# Patient Record
Sex: FEMALE | Race: BLACK OR AFRICAN AMERICAN | NOT HISPANIC OR LATINO | Employment: FULL TIME | URBAN - METROPOLITAN AREA
[De-identification: names, ages, dates, MRNs, and addresses within clinical notes are randomized per-mention and may not be internally consistent; named-entity substitution may affect disease eponyms.]

---

## 2017-01-04 ENCOUNTER — TELEPHONE (OUTPATIENT)
Dept: PODIATRY | Facility: CLINIC | Age: 47
End: 2017-01-04

## 2017-01-10 ENCOUNTER — TELEPHONE (OUTPATIENT)
Dept: FAMILY MEDICINE | Facility: CLINIC | Age: 47
End: 2017-01-10

## 2017-01-10 ENCOUNTER — OFFICE VISIT (OUTPATIENT)
Dept: PODIATRY | Facility: CLINIC | Age: 47
End: 2017-01-10
Payer: MEDICAID

## 2017-01-10 VITALS
WEIGHT: 260.38 LBS | HEART RATE: 78 BPM | RESPIRATION RATE: 20 BRPM | DIASTOLIC BLOOD PRESSURE: 77 MMHG | SYSTOLIC BLOOD PRESSURE: 120 MMHG | HEIGHT: 64 IN | BODY MASS INDEX: 44.45 KG/M2

## 2017-01-10 DIAGNOSIS — M79.672 PAIN IN BOTH FEET: Primary | ICD-10-CM

## 2017-01-10 DIAGNOSIS — L84 CORN OR CALLUS: ICD-10-CM

## 2017-01-10 DIAGNOSIS — M79.673 PAIN OF FOOT, UNSPECIFIED LATERALITY: Primary | ICD-10-CM

## 2017-01-10 DIAGNOSIS — B35.1 ONYCHOMYCOSIS DUE TO DERMATOPHYTE: ICD-10-CM

## 2017-01-10 DIAGNOSIS — M79.671 PAIN IN BOTH FEET: Primary | ICD-10-CM

## 2017-01-10 PROCEDURE — 99203 OFFICE O/P NEW LOW 30 MIN: CPT | Mod: S$GLB,,, | Performed by: PODIATRIST

## 2017-01-10 RX ORDER — CICLOPIROX 80 MG/ML
SOLUTION TOPICAL NIGHTLY
Qty: 6.6 ML | Refills: 3 | Status: SHIPPED | OUTPATIENT
Start: 2017-01-10 | End: 2017-03-31 | Stop reason: SDUPTHER

## 2017-01-10 RX ORDER — CICLOPIROX 80 MG/ML
SOLUTION TOPICAL NIGHTLY
Qty: 6.6 ML | Refills: 3 | Status: SHIPPED | OUTPATIENT
Start: 2017-01-10 | End: 2017-01-10 | Stop reason: SDUPTHER

## 2017-01-10 NOTE — PATIENT INSTRUCTIONS
What Are Corns and Calluses?    Corns and calluses are your bodys response to friction or pressure against the skin. If your foot rubs the inside of your shoe, the affected area of skin thickens. Or, if a bone is not in the normal position, skin caught between bone and shoe or bone and ground builds up. In either case, the outer layer of skin thickens to protect the foot from unusual pressure. In many cases, corns and calluses look bad but are not harmful. However, more severe corns and calluses may become infected, destroy healthy tissue, or affect foot movement.    Corns  Corns usually grow on top of the foot, often at the toe joint. Corns can range from a slight thickening of skin to a painful soft or hard bump. They often form on top of buckled toe joints (hammer toes). If your toes curl under, corns may grow on the tips of the toes. You may also get a corn on the end of a toe if it rubs against your shoe. Corns can also grow between toes, often between the first and second toes.    Calluses  Calluses grow on the bottom of the foot or on the outer edge of a toe or heel. A callus may spread across the ball of your foot. This type of callus is usually due to a problem with a metatarsal (the long bone at the base of a toe, near the ball of the foot). A pinch callus may grow along the outer edge of the heel or the big toe. Some calluses press up into the foot instead of spreading on the outside. A callus may form a central core or plug of tissue where pressure is greatest.  © 8558-1726 [employer]. [address] [place] [address]. All rights reserved. This information is not intended as a substitute for professional medical care. Always follow your healthcare professional's instructions.      Treating Corns and Calluses  If your corns or calluses are mild, reducing friction may help. Different shoes, moleskin patches, or soft pads may be all the treatment you need. In more severe cases,  treating tissue buildup may require your doctors care. Sometimes custom-made shoe inserts (orthotics) or special pads are prescribed to reduce friction and pressure.    Change shoes  If you have corns, your doctor may suggest wearing shoes that have more toe room. This way, buckled joints are less likely to be pinched against the top of the shoe. If you have calluses, wearing a cushioned insole, arch support, or heel counter can help reduce friction.  Visit your doctor  In some cases, your doctor may trim away the outer layers of skin that make up the corn or callus. For a painful corn, medicine may be injected beneath the built-up tissue.  Wear orthotics  Orthotics are specially made to meet the needs of your feet. They cushion calluses or divert pressure away from these problem areas. Worn as directed, orthotics help limit existing problems and prevent new ones from forming.  If you need surgery  If a bone or joint is out of place, certain parts of your foot may be under too much pressure. This can cause severe corns and calluses. In such cases, surgery may be the best way to correct the problem.  Outpatient procedures  In most cases, surgery to improve bone position is an outpatient procedure. Your doctor may cut away excess bone, reposition prominent bones, or even fuse joints. Sometimes tendons or ligaments are cut to reduce tension on a bone or joint. Your doctor will talk with you about the procedure that is best suited to your needs.  © 2911-4446 [employer]. [address] [place] [address], [place] [id number]. All rights reserved. This information is not intended as a substitute for professional medical care. Always follow your healthcare professional's instructions.          Fungal Infection of Nails  Fungal infection of the nails changes the way fingernails and toenails look. They may thicken, discolor, change shape, or split. This condition is hard to treat because nails grow slowly and have limited  blood supply. It is common for the infection to come back after treatment.  There are 2 types of medicines used.  · Topical anti-fungal medicines are applied to the surface of the skin and nail area. These medicines are not very effective because they cannot get deep into the nail.  · Topical medicines are most useful in combination with oral medicines. Oral antifungal medicines work better because they penetrate the nail from the inside out. However, recurrence still occurs and it may take 9 to 12 months to determine if you have been cured or not (i.e., for your nail to look normal again). You can repeat treatment if needed. Medications are well-tolerated and it is uncommon to need to stop therapy due to side effects, but your doctor may perform some monitoring tests. Discuss potential side effects with your doctor before starting treatment.  If medicines fail, the nail can be removed surgically or chemically. This improves the effectiveness of medical treatment because the fungus is physically removed from the body.  Home care  The following will help you care for your infection at home:  1. Use medicines exactly as directed for as long as directed. Treating a fungal infection can take longer than other kinds of infections.  2. Smoking is a risk factor for fungal infection. This is one more reason to quit.  3. Wear absorbent socks and shoes that let your feet breathe. Sweaty feet increase risk of fungal infection and make an existing infection harder to treat.  4. Use footwear when in damp public places like swimming pools, gyms, and shower rooms. This will help you avoid the fungus that grows there.  5. Don't share nail clippers or scissors with others.  Follow-up care  Follow up with your doctor as directed by our staff.  When to seek medical care  Get prompt medical attention if any of the following occur:  · Skin by the nail becomes reddened, swollen, painful, or drains pus  · Side effects from oral  anti-fungal medicines  © 1193-7283 [employer]. [address] [place] [address], [place] [address]. All rights reserved. This information is not intended as a substitute for professional medical care. Always follow your healthcare professional's instructions.  Ciclopirox Olamine Topical solution  What is this medicine?  CICLOPIROX (sye kloe PEER ox) NAIL SOLUTION is an antifungal medicine. It used to treat fungal infections of the nails.  This medicine may be used for other purposes; ask your health care provider or pharmacist if you have questions.  What should I tell my health care provider before I take this medicine?  They need to know if you have any of these conditions:  · diabetes mellitus  · history of seizures  · HIV infection  · immune system problems or organ transplant  · large areas of burned or damaged skin  · peripheral vascular disease or poor circulation  · taking corticosteroid medication (including steroid inhalers, cream, or lotion)  · an unusual or allergic reaction to ciclopirox, isopropyl alcohol, other medicines, foods, dyes, or preservatives  · pregnant or trying to get pregnant  · breast-feeding  How should I use this medicine?  This medicine is for external use only. Follow the directions that come with this medicine exactly. Wash and dry your hands before use. Avoid contact with the eyes, mouth or nose. If you do get this medicine in your eyes, rinse out with plenty of cool tap water. Contact your doctor or health care professional if eye irritation occurs. Use at regular intervals. Do not use your medicine more often than directed. Finish the full course prescribed by your doctor or health care professional even if you think you are better. Do not stop using except on your doctor's advice.  Talk to your pediatrician regarding the use of this medicine in children. While this medicine may be prescribed for children as young as 12 years for selected conditions, precautions do  apply.  Overdosage: If you think you have taken too much of this medicine contact a poison control center or emergency room at once.  NOTE: This medicine is only for you. Do not share this medicine with others.  What if I miss a dose?  If you miss a dose, use it as soon as you can. If it is almost time for your next dose, use only that dose. Do not use double or extra doses.  What may interact with this medicine?  Interactions are not expected. Do not use any other skin products without telling your doctor or health care professional.  This list may not describe all possible interactions. Give your health care provider a list of all the medicines, herbs, non-prescription drugs, or dietary supplements you use. Also tell them if you smoke, drink alcohol, or use illegal drugs. Some items may interact with your medicine.  What should I watch for while using this medicine?  Tell your doctor or health care professional if your symptoms get worse. Four to six months of treatment may be needed for the nail(s) to improve. Some people may not achieve a complete cure or clearing of the nails by this time.  Tell your doctor or health care professional if you develop sores or blisters that do not heal properly. If your nail infection returns after stopping using this product, contact your doctor or health care professional.  What side effects may I notice from receiving this medicine?  Side effects that you should report to your doctor or health care professional as soon as possible:  · allergic reactions like skin rash, itching or hives, swelling of the face, lips, or tongue  · severe irritation, redness, burning, blistering, peeling, swelling, oozing  Side effects that usually do not require medical attention (report to your doctor or health care professional if they continue or are bothersome):  · mild reddening of the skin  · nail discoloration  · temporary burning or mild stinging at the site of application  This list may  not describe all possible side effects. Call your doctor for medical advice about side effects. You may report side effects to FDA at 1-836-UFX-0174.  Where should I keep my medicine?  Keep out of the reach of children.  Store at room temperature between 15 and 30 degrees C (59 and 86 degrees F). Do not freeze. Protect from light by storing the bottle in the carton after every use. This medicine is flammable. Keep away from heat and flame. Throw away any unused medicine after the expiration date.  NOTE:This sheet is a summary. It may not cover all possible information. If you have questions about this medicine, talk to your doctor, pharmacist, or health care provider. Copyright© 2016 [employer]

## 2017-01-10 NOTE — PROGRESS NOTES
Subjective:    Patient ID: Indira Yousif is a 46 y.o. female.    Chief Complaint: Callouses (bilateral feet) and Toe Pain      HPI:   Indira is a 46 y.o. female who presents to the clinic complaining of thick and discolored toenails on both feet. Indira is inquiring about treatment options.  Pt also reports b/l foot pain. Denies trauma. Feels lesions on bottom of feet that she thinks are growing. No treatments so far. Pain 3/10.     HPI    Last Podiatry Enc: Visit date not found  Last Enc w/ Me: Visit date not found    Past Medical History   Diagnosis Date    Anxiety      Past Surgical History   Procedure Laterality Date    Tubal ligation       section       x2    Hernia repair      Cyst removal Left     Hysterectomy       partial     Social History     Social History    Marital status:      Spouse name: N/A    Number of children: N/A    Years of education: N/A     Occupational History    Not on file.     Social History Main Topics    Smoking status: Never Smoker    Smokeless tobacco: Not on file    Alcohol use 0.0 oz/week     0 Standard drinks or equivalent per week      Comment: socially    Drug use: No    Sexual activity: Yes     Partners: Male     Birth control/ protection: Surgical     Other Topics Concern    Not on file     Social History Narrative    Lives with  and 2 children (21,13). Grandbaby visits frequently. Works are [employer]. Watch movies. Family life is a big component of her life. Raises rabbits, and garden.          Current Outpatient Prescriptions:     ciclopirox (PENLAC) 8 % Soln, Apply topically nightly., Disp: 6.6 mL, Rfl: 3    hydrocodone-acetaminophen 7.5-325mg (NORCO) 7.5-325 mg per tablet, Take 1 tablet by mouth every 6 (six) hours as needed for Pain., Disp: 40 tablet, Rfl: 0    oxycodone-acetaminophen (PERCOCET)  mg per tablet, Take 1 tablet by mouth every 4 (four) hours as needed., Disp: 40 tablet, Rfl: 0  Review of patient's  "allergies indicates:  No Known Allergies    Visit Vitals    /77 (BP Location: Left arm, Patient Position: Sitting, BP Method: Automatic)    Pulse 78    Resp 20    Ht 5' 4" (1.626 m)    Wt 118.1 kg (260 lb 6.4 oz)    BMI 44.7 kg/m2       ROS  ROS Constitutional:  General Appearance: well nourished  Vascular: negative for cramps, edema and bruising  Musculoskeletal: negative for joint paint and joint edema  Skin: negative for rashes and lesions  Neurological: negative for burning, tingling and numbness  Gastrointestinal: negative for stomach pain, nausea and vomiting        Objective:        Ortho/SPM Exam  Physical Exam    V: DP 1/4, PT 2/4, CRT< 3s to all digits tested.     N: SILT in SP/DP/T/Alissa/Saph distributions.    Derm: Skin intact. No erythema, edema or ecchymosis. + hyperkeratotic lesions L 5th met base and R plantar foot. Nails elongated, mycotic, and dystrophic x10.    Ortho:  +Motor EHL/FHL/TA/GA   +TTP L 5th met base and R plantar midfoot   Compartments soft/compressible. No pain on passive stretch of big toe. No calf  pain.        Assessment:     Imaging: ordered on way out        1. Pain in both feet    2. Corn or callus    3. Onychomycosis due to dermatophyte          Plan:       Orders Placed This Encounter    X-Ray Foot Complete Bilateral    ciclopirox (PENLAC) 8 % Soln     .   Indira was seen today for callouses and toe pain.    Diagnoses and all orders for this visit:    Pain in both feet  -     X-Ray Foot Complete Bilateral; Future    Corn or callus    Onychomycosis due to dermatophyte  -     ciclopirox (PENLAC) 8 % Soln; Apply topically nightly.        Indira Yousif is a 46 y.o. female presenting w/   1. Pain in both feet    2. Corn or callus    3. Onychomycosis due to dermatophyte        -pt seen, evaluated, and managed  -dx discussed in detail. All questions/concerns addressed  -all tx options discussed. All alternatives, risks, benefits of all txs discussed  -The patient was " educated regarding the above diagnosis. We discussed conservative care options regarding shoe wear and/or padding.  -rxs dispensed: penlac for fungal nails  -offloading pads for calluses  -rec'd OTC wart kit for calluses  -xr on way out --> will review at nxt visit       Return in about 6 weeks (around 2/21/2017) for xr review.

## 2017-01-10 NOTE — MR AVS SNAPSHOT
[address]  [address]   [place] [address]  Phone: [phone]  Fax: [phone]                  Indira Yousif   1/10/2017 3:00 PM   Office Visit    Description:  Female : 1970   Provider:  Guru Benito Jr., DPM   Department:  [facility]           Reason for Visit     Callouses     Toe Pain           Diagnoses this Visit        Comments    Pain in both feet    -  Primary     Corn or callus         Onychomycosis due to dermatophyte                To Do List           Future Appointments        Provider Department Dept Phone    2017 2:00 PM Guru Benito Jr., DPM [facility] [phone]      Goals (5 Years of Data)     None      Follow-Up and Disposition     Return in about 6 weeks (around 2017) for xr review.       These Medications        Disp Refills Start End    ciclopirox (PENLAC) 8 % Soln 6.6 mL 3 1/10/2017     Apply topically nightly. - Topical    Pharmacy: [facility] [address] [place] [address] [id number] [place] [address] Ph #: [phone]         [facility]     [facility] On [facility] Nurse Care Line -  Assistance  Registered nurses in the [facility] Center provide clinical advisement, health education, appointment booking, and other advisory services.  Call for this free service at [phone].             Medications           Message regarding Medications     Verify the changes and/or additions to your medication regime listed below are the same as discussed with your clinician today.  If any of these changes or additions are incorrect, please notify your healthcare provider.        START taking these NEW medications        Refills    ciclopirox (PENLAC) 8 % Soln 3    Sig: Apply topically nightly.    Class: Normal    Route: Topical           Verify that the below list of medications is an accurate representation of the medications you are currently taking.  If none reported, the list may be  "blank. If incorrect, please contact your healthcare provider. Carry this list with you in case of emergency.           Current Medications     ciclopirox (PENLAC) 8 % Soln Apply topically nightly.    hydrocodone-acetaminophen 7.5-325mg (NORCO) 7.5-325 mg per tablet Take 1 tablet by mouth every 6 (six) hours as needed for Pain.    oxycodone-acetaminophen (PERCOCET)  mg per tablet Take 1 tablet by mouth every 4 (four) hours as needed.           Clinical Reference Information           Vital Signs - Last Recorded  Most recent update: 1/10/2017  2:32 PM by Senait Keller LPN    BP Pulse Resp Ht Wt BMI    120/77 (BP Location: Left arm, Patient Position: Sitting, BP Method: Automatic) 78 20 5' 4" (1.626 m) 118.1 kg (260 lb 6.4 oz) 44.7 kg/m2      Blood Pressure          Most Recent Value    BP  120/77      Allergies as of 1/10/2017     No Known Allergies      Immunizations Administered on Date of Encounter - 1/10/2017     None      Orders Placed During Today's Visit     Future Labs/Procedures Expected by Expires    X-Ray Foot Complete Bilateral  1/10/2017 1/10/2018      Instructions      What Are Corns and Calluses?    Corns and calluses are your bodys response to friction or pressure against the skin. If your foot rubs the inside of your shoe, the affected area of skin thickens. Or, if a bone is not in the normal position, skin caught between bone and shoe or bone and ground builds up. In either case, the outer layer of skin thickens to protect the foot from unusual pressure. In many cases, corns and calluses look bad but are not harmful. However, more severe corns and calluses may become infected, destroy healthy tissue, or affect foot movement.    Corns  Corns usually grow on top of the foot, often at the toe joint. Corns can range from a slight thickening of skin to a painful soft or hard bump. They often form on top of buckled toe joints (hammer toes). If your toes curl under, corns may grow on the tips of " the toes. You may also get a corn on the end of a toe if it rubs against your shoe. Corns can also grow between toes, often between the first and second toes.    Calluses  Calluses grow on the bottom of the foot or on the outer edge of a toe or heel. A callus may spread across the ball of your foot. This type of callus is usually due to a problem with a metatarsal (the long bone at the base of a toe, near the ball of the foot). A pinch callus may grow along the outer edge of the heel or the big toe. Some calluses press up into the foot instead of spreading on the outside. A callus may form a central core or plug of tissue where pressure is greatest.  © 6624-8990 [employer]. [address] [place] [address], [place] [address]. All rights reserved. This information is not intended as a substitute for professional medical care. Always follow your healthcare professional's instructions.      Treating Corns and Calluses  If your corns or calluses are mild, reducing friction may help. Different shoes, moleskin patches, or soft pads may be all the treatment you need. In more severe cases, treating tissue buildup may require your doctors care. Sometimes custom-made shoe inserts (orthotics) or special pads are prescribed to reduce friction and pressure.    Change shoes  If you have corns, your doctor may suggest wearing shoes that have more toe room. This way, buckled joints are less likely to be pinched against the top of the shoe. If you have calluses, wearing a cushioned insole, arch support, or heel counter can help reduce friction.  Visit your doctor  In some cases, your doctor may trim away the outer layers of skin that make up the corn or callus. For a painful corn, medicine may be injected beneath the built-up tissue.  Wear orthotics  Orthotics are specially made to meet the needs of your feet. They cushion calluses or divert pressure away from these problem areas. Worn as directed, orthotics help limit  existing problems and prevent new ones from forming.  If you need surgery  If a bone or joint is out of place, certain parts of your foot may be under too much pressure. This can cause severe corns and calluses. In such cases, surgery may be the best way to correct the problem.  Outpatient procedures  In most cases, surgery to improve bone position is an outpatient procedure. Your doctor may cut away excess bone, reposition prominent bones, or even fuse joints. Sometimes tendons or ligaments are cut to reduce tension on a bone or joint. Your doctor will talk with you about the procedure that is best suited to your needs.  © 5231-5350 [employer]. [address] [place] [address], [place] [address]. All rights reserved. This information is not intended as a substitute for professional medical care. Always follow your healthcare professional's instructions.          Fungal Infection of Nails  Fungal infection of the nails changes the way fingernails and toenails look. They may thicken, discolor, change shape, or split. This condition is hard to treat because nails grow slowly and have limited blood supply. It is common for the infection to come back after treatment.  There are 2 types of medicines used.  · Topical anti-fungal medicines are applied to the surface of the skin and nail area. These medicines are not very effective because they cannot get deep into the nail.  · Topical medicines are most useful in combination with oral medicines. Oral antifungal medicines work better because they penetrate the nail from the inside out. However, recurrence still occurs and it may take 9 to 12 months to determine if you have been cured or not (i.e., for your nail to look normal again). You can repeat treatment if needed. Medications are well-tolerated and it is uncommon to need to stop therapy due to side effects, but your doctor may perform some monitoring tests. Discuss potential side effects with your doctor before  starting treatment.  If medicines fail, the nail can be removed surgically or chemically. This improves the effectiveness of medical treatment because the fungus is physically removed from the body.  Home care  The following will help you care for your infection at home:  1. Use medicines exactly as directed for as long as directed. Treating a fungal infection can take longer than other kinds of infections.  2. Smoking is a risk factor for fungal infection. This is one more reason to quit.  3. Wear absorbent socks and shoes that let your feet breathe. Sweaty feet increase risk of fungal infection and make an existing infection harder to treat.  4. Use footwear when in damp public places like swimming pools, gyms, and shower rooms. This will help you avoid the fungus that grows there.  5. Don't share nail clippers or scissors with others.  Follow-up care  Follow up with your doctor as directed by our staff.  When to seek medical care  Get prompt medical attention if any of the following occur:  · Skin by the nail becomes reddened, swollen, painful, or drains pus  · Side effects from oral anti-fungal medicines  © 1056-0449 [employer]. [address] [place] [address]. All rights reserved. This information is not intended as a substitute for professional medical care. Always follow your healthcare professional's instructions.

## 2017-01-18 ENCOUNTER — PATIENT MESSAGE (OUTPATIENT)
Dept: ADMINISTRATIVE | Facility: OTHER | Age: 47
End: 2017-01-18

## 2017-01-18 ENCOUNTER — OFFICE VISIT (OUTPATIENT)
Dept: FAMILY MEDICINE | Facility: CLINIC | Age: 47
End: 2017-01-18
Payer: MEDICAID

## 2017-01-18 VITALS
DIASTOLIC BLOOD PRESSURE: 78 MMHG | BODY MASS INDEX: 44.48 KG/M2 | OXYGEN SATURATION: 98 % | HEIGHT: 64 IN | HEART RATE: 76 BPM | SYSTOLIC BLOOD PRESSURE: 120 MMHG | WEIGHT: 260.56 LBS

## 2017-01-18 DIAGNOSIS — G89.29 CHRONIC LEFT-SIDED THORACIC BACK PAIN: Primary | ICD-10-CM

## 2017-01-18 DIAGNOSIS — M25.562 CHRONIC PAIN OF LEFT KNEE: ICD-10-CM

## 2017-01-18 DIAGNOSIS — G89.29 CHRONIC PAIN OF LEFT KNEE: ICD-10-CM

## 2017-01-18 DIAGNOSIS — M54.6 CHRONIC LEFT-SIDED THORACIC BACK PAIN: Primary | ICD-10-CM

## 2017-01-18 PROCEDURE — 99214 OFFICE O/P EST MOD 30 MIN: CPT | Mod: S$PBB,,,

## 2017-01-18 PROCEDURE — 99999 PR PBB SHADOW E&M-EST. PATIENT-LVL III: CPT | Mod: PBBFAC,,,

## 2017-01-18 PROCEDURE — 99213 OFFICE O/P EST LOW 20 MIN: CPT | Mod: PBBFAC,PO

## 2017-01-18 RX ORDER — DULOXETIN HYDROCHLORIDE 60 MG/1
60 CAPSULE, DELAYED RELEASE ORAL DAILY
Qty: 30 CAPSULE | Refills: 11 | Status: SHIPPED | OUTPATIENT
Start: 2017-01-18 | End: 2017-03-31 | Stop reason: SDUPTHER

## 2017-01-18 RX ORDER — OXYCODONE AND ACETAMINOPHEN 10; 325 MG/1; MG/1
1 TABLET ORAL EVERY 4 HOURS PRN
Qty: 30 TABLET | Refills: 0 | Status: SHIPPED | OUTPATIENT
Start: 2017-01-18 | End: 2018-09-06 | Stop reason: SDUPTHER

## 2017-01-18 RX ORDER — IBUPROFEN 800 MG/1
800 TABLET ORAL 3 TIMES DAILY
Qty: 30 TABLET | Refills: 2 | Status: SHIPPED | OUTPATIENT
Start: 2017-01-18 | End: 2017-03-31 | Stop reason: SDUPTHER

## 2017-01-18 NOTE — PROGRESS NOTES
Subjective:       Patient ID: Indira Yousif is a 46 y.o. female.    Chief Complaint: Leg Pain    HPI The patient presents with complaints of chronic back pain. She was under the care of Dr. Tejeda who at one time was prescribing her opioid pain medication but he stopped because she was gaining weight. She had lumbar spine X-Rays in 2015:    Reviewed By Vlad Nuñez MD on 8/17/2015  8:24 AM   External Result Report   External Result Report   Narrative   Five views lumbar spine.  Mild lordosis, mild degenerative disk, spondylosis especially at the L2 through L4 levels.  Facet arthropathy L5-S1.  Mild levoscoliosis thoracic lumbar junction.   Impression    Mild degenerative disk, spondylosis      Electronically signed by: GERTRUDIS FORBES MD  Date: 08/14/15  Time: 13:15     Encounter   View Encounter          Back pain if she stands or sits too long. Not daily or constant. She describes it as a throbbing and burning sensation in her left flank. Her left foot gets numb. Non-pleuritic. She gained 20 lbs since last year despite taking phentermine for 3 months. She is doing F[employer].   She had a Lumbar MRI that showed findings at T11-T12 incompletely characterized because the study was limited to the lumbar region. It is severe in intensity.   She also has pain in her left knee. She did not respond to hydrocodone 7.5 or meloxicam. She did have reduction of pain with oxycodone 10 mg.       Review of Systems   Constitutional: Negative.  Negative for activity change, appetite change, chills, diaphoresis, fatigue, fever and unexpected weight change.   Respiratory: Negative.  Negative for shortness of breath.    Cardiovascular: Negative.  Negative for chest pain.   Gastrointestinal: Negative.    Genitourinary: Negative for menstrual problem.   Musculoskeletal: Positive for back pain. Negative for arthralgias, gait problem, joint swelling, myalgias, neck pain and neck stiffness.   Psychiatric/Behavioral:  Negative.  Negative for agitation, dysphoric mood and sleep disturbance.   All other systems reviewed and are negative.      Objective:      Vitals:    01/18/17 1407   BP: 120/78   Pulse: 76     Physical Exam   Constitutional: She is oriented to person, place, and time. She appears well-developed and well-nourished. She is active.  Non-toxic appearance. She does not have a sickly appearance. She does not appear ill. No distress.   HENT:   Head: Normocephalic and atraumatic.   Right Ear: External ear normal.   Left Ear: External ear normal.   Nose: Nose normal.   Hearing normal.    Eyes: Conjunctivae are normal. Pupils are equal, round, and reactive to light.   Neck: Normal range of motion. Neck supple. No thyroid mass and no thyromegaly present.   Cardiovascular: Normal rate, regular rhythm, normal heart sounds and intact distal pulses.  Exam reveals no gallop and no friction rub.    No murmur heard.  Pulses:       Dorsalis pedis pulses are 2+ on the right side, and 2+ on the left side.        Posterior tibial pulses are 2+ on the right side, and 2+ on the left side.   Pulmonary/Chest: Effort normal and breath sounds normal. No respiratory distress. She exhibits no tenderness.   Musculoskeletal: Normal range of motion. She exhibits no edema.   Lymphadenopathy:     She has no cervical adenopathy.   Neurological: She is alert and oriented to person, place, and time. She has normal strength. Coordination and gait normal.   Skin: Skin is warm and dry. She is not diaphoretic. No pallor.   Psychiatric: She has a normal mood and affect. Her speech is normal and behavior is normal. Judgment and thought content normal. Cognition and memory are normal.   Vitals reviewed.      Assessment:       1. Chronic left-sided thoracic back pain    2. Chronic pain of left knee        Plan:       Chronic left-sided thoracic back pain  -     Ambulatory referral to Pain Clinic    Chronic pain of left knee  -     Ambulatory referral to Pain  Clinic    Other orders  -     duloxetine (CYMBALTA) 60 MG capsule; Take 1 capsule (60 mg total) by mouth once daily.  Dispense: 30 capsule; Refill: 11      Return if symptoms worsen or fail to improve.

## 2017-01-18 NOTE — MR AVS SNAPSHOT
[facility]  [address] [address]  Phone: [phone]  Fax: [phone]                  Indira Yousif   2017 2:20 PM   Office Visit    Description:  Female : 1970   Provider:  Deangelo Graham Jr., MD   Department:  [facility]           Reason for Visit     Leg Pain           Diagnoses this Visit        Comments    Chronic left-sided thoracic back pain    -  Primary     Chronic pain of left knee                To Do List           Future Appointments        Provider Department Dept Phone    2017 2:00 PM Guru Benito Jr., DPANDIE [facility] [phone]      Goals (5 Years of Data)     None      Follow-Up and Disposition     Return if symptoms worsen or fail to improve.    Follow-up and Disposition History       These Medications        Disp Refills Start End    duloxetine (CYMBALTA) 60 MG capsule 30 capsule 11 2017    Take 1 capsule (60 mg total) by mouth once daily. - Oral    Pharmacy: [facility] [place] [address] Ph #: [phone]       ibuprofen (ADVIL,MOTRIN) 800 MG tablet 30 tablet 2 2017     Take 1 tablet (800 mg total) by mouth 3 (three) times daily. - Oral    Pharmacy: [facility] [facility] [place] [address] Ph #: [phone]       oxycodone-acetaminophen (PERCOCET)  mg per tablet 30 tablet 0 2017     Take 1 tablet by mouth every 4 (four) hours as needed. - Oral    Pharmacy: [facility] [facility] [place] [address] Ph #: [phone]         [facility]     [facility] -  Assistance  Registered nurses in the [facility] provide clinical advisement, health education, appointment booking, and other advisory services.  Call for this free service at [phone].             Medications           Message regarding  "Medications     Verify the changes and/or additions to your medication regime listed below are the same as discussed with your clinician today.  If any of these changes or additions are incorrect, please notify your healthcare provider.        START taking these NEW medications        Refills    duloxetine (CYMBALTA) 60 MG capsule 11    Sig: Take 1 capsule (60 mg total) by mouth once daily.    Class: Normal    Route: Oral    ibuprofen (ADVIL,MOTRIN) 800 MG tablet 2    Sig: Take 1 tablet (800 mg total) by mouth 3 (three) times daily.    Class: Normal    Route: Oral      STOP taking these medications     hydrocodone-acetaminophen 7.5-325mg (NORCO) 7.5-325 mg per tablet Take 1 tablet by mouth every 6 (six) hours as needed for Pain.           Verify that the below list of medications is an accurate representation of the medications you are currently taking.  If none reported, the list may be blank. If incorrect, please contact your healthcare provider. Carry this list with you in case of emergency.           Current Medications     oxycodone-acetaminophen (PERCOCET)  mg per tablet Take 1 tablet by mouth every 4 (four) hours as needed.    ciclopirox (PENLAC) 8 % Soln Apply topically nightly.    duloxetine (CYMBALTA) 60 MG capsule Take 1 capsule (60 mg total) by mouth once daily.    ibuprofen (ADVIL,MOTRIN) 800 MG tablet Take 1 tablet (800 mg total) by mouth 3 (three) times daily.           Clinical Reference Information           Vital Signs - Last Recorded  Most recent update: 1/18/2017  2:13 PM by Jany Bower MA    BP Pulse Ht Wt SpO2 BMI    120/78 (BP Location: Left arm, Patient Position: Sitting, BP Method: Manual) 76 5' 4" (1.626 m) 118.2 kg (260 lb 9.3 oz) 98% 44.73 kg/m2      Blood Pressure          Most Recent Value    BP  120/78      Allergies as of 1/18/2017     No Known Allergies      Immunizations Administered on Date of Encounter - 1/18/2017     None      Orders Placed During Today's Visit      " Normal Orders This Visit    Ambulatory referral to Pain Clinic     Future Labs/Procedures Expected by Expires    X-Ray Knee AP Standing Bilateral  1/18/2017 1/18/2018

## 2017-02-17 RX ORDER — OXYCODONE AND ACETAMINOPHEN 10; 325 MG/1; MG/1
1 TABLET ORAL EVERY 4 HOURS PRN
Qty: 30 TABLET | Refills: 0 | OUTPATIENT
Start: 2017-02-17

## 2017-02-20 RX ORDER — OXYCODONE AND ACETAMINOPHEN 10; 325 MG/1; MG/1
1 TABLET ORAL EVERY 4 HOURS PRN
Qty: 30 TABLET | Refills: 0 | OUTPATIENT
Start: 2017-02-20

## 2017-02-20 NOTE — TELEPHONE ENCOUNTER
----- Message from Silvana Perez sent at 2/20/2017  4:12 PM CST -----  Is asking to speak to Sandy Younger her at   [phone]    Refill on meds/percocet 10

## 2017-03-30 ENCOUNTER — PATIENT MESSAGE (OUTPATIENT)
Dept: PODIATRY | Facility: CLINIC | Age: 47
End: 2017-03-30

## 2017-03-30 ENCOUNTER — PATIENT MESSAGE (OUTPATIENT)
Dept: FAMILY MEDICINE | Facility: CLINIC | Age: 47
End: 2017-03-30

## 2017-03-31 DIAGNOSIS — B35.1 ONYCHOMYCOSIS DUE TO DERMATOPHYTE: ICD-10-CM

## 2017-03-31 RX ORDER — CICLOPIROX 80 MG/ML
SOLUTION TOPICAL NIGHTLY
Qty: 6.6 ML | Refills: 3 | Status: SHIPPED | OUTPATIENT
Start: 2017-03-31 | End: 2019-07-05

## 2017-04-04 RX ORDER — DULOXETIN HYDROCHLORIDE 60 MG/1
60 CAPSULE, DELAYED RELEASE ORAL DAILY
Qty: 30 CAPSULE | Refills: 11 | Status: SHIPPED | OUTPATIENT
Start: 2017-04-04 | End: 2019-07-05

## 2017-04-04 RX ORDER — IBUPROFEN 800 MG/1
800 TABLET ORAL 3 TIMES DAILY
Qty: 30 TABLET | Refills: 2 | Status: SHIPPED | OUTPATIENT
Start: 2017-04-04 | End: 2018-09-06

## 2017-04-18 ENCOUNTER — PATIENT MESSAGE (OUTPATIENT)
Dept: FAMILY MEDICINE | Facility: CLINIC | Age: 47
End: 2017-04-18

## 2017-04-18 RX ORDER — TIZANIDINE 4 MG/1
4 TABLET ORAL EVERY 8 HOURS
Qty: 90 TABLET | Refills: 5 | Status: SHIPPED | OUTPATIENT
Start: 2017-04-18 | End: 2017-04-28

## 2017-04-20 ENCOUNTER — PATIENT MESSAGE (OUTPATIENT)
Dept: PODIATRY | Facility: CLINIC | Age: 47
End: 2017-04-20

## 2017-04-24 ENCOUNTER — TELEPHONE (OUTPATIENT)
Dept: PODIATRY | Facility: CLINIC | Age: 47
End: 2017-04-24

## 2017-04-27 ENCOUNTER — OFFICE VISIT (OUTPATIENT)
Dept: PODIATRY | Facility: CLINIC | Age: 47
End: 2017-04-27
Payer: MEDICAID

## 2017-04-27 VITALS
BODY MASS INDEX: 45.07 KG/M2 | SYSTOLIC BLOOD PRESSURE: 113 MMHG | DIASTOLIC BLOOD PRESSURE: 74 MMHG | HEART RATE: 80 BPM | HEIGHT: 64 IN | RESPIRATION RATE: 18 BRPM | WEIGHT: 264 LBS

## 2017-04-27 DIAGNOSIS — M79.673 PAIN OF FOOT, UNSPECIFIED LATERALITY: Primary | ICD-10-CM

## 2017-04-27 DIAGNOSIS — M72.2 PLANTAR FASCIITIS, LEFT: ICD-10-CM

## 2017-04-27 DIAGNOSIS — L84 CORN OR CALLUS: ICD-10-CM

## 2017-04-27 PROCEDURE — 20550 NJX 1 TENDON SHEATH/LIGAMENT: CPT | Mod: S$GLB,,, | Performed by: PODIATRIST

## 2017-04-27 PROCEDURE — 99213 OFFICE O/P EST LOW 20 MIN: CPT | Mod: 25,S$GLB,, | Performed by: PODIATRIST

## 2017-04-27 RX ADMIN — DEXAMETHASONE SODIUM PHOSPHATE 4 MG: 4 INJECTION, SOLUTION INTRA-ARTICULAR; INTRALESIONAL; INTRAMUSCULAR; INTRAVENOUS; SOFT TISSUE at 09:04

## 2017-04-27 NOTE — LETTER
May 1, 2017      Deangelo Graham Jr., MD  [address]  [facility] [address]           [facility]  [address]   [facility] [address]  Phone: [phone]  Fax: [phone]          Patient: Indira Yousif   MR Number: [id number]   YOB: 1970   Date of Visit: 4/27/2017       Dear Dr. Deangelo Graham Jr.:    Thank you for referring Indira Yousif to me for evaluation. Attached you will find relevant portions of my assessment and plan of care.    If you have questions, please do not hesitate to call me. I look forward to following Indira Yousif along with you.    Sincerely,    Guur Benito Jr., DPM    Enclosure  CC:  No Recipients    If you would like to receive this communication electronically, please contact [email or web address] or [phone] to request more information on [employer] Link access.    For providers and/or their staff who would like to refer a patient to [facility], please contact us through our one-stop-shop provider referral line, [facility], at [phone].    If you feel you have received this communication in error or would no longer like to receive these types of communications, please e-mail [email or web address]

## 2017-04-27 NOTE — MR AVS SNAPSHOT
[facility]  [address], [address]   [place] [address]  Phone: [phone]  Fax: [phone]                  Indira Yousif   2017 2:00 PM   Office Visit    Description:  Female : 1970   Provider:  Guru Benito Jr., DPM   Department:  [facility]           Reason for Visit     Foot Pain           Diagnoses this Visit        Comments    Pain of foot, unspecified laterality    -  Primary     Corn or callus                To Do List           Future Appointments        Provider Department Dept Phone    5/3/2017 3:15 PM MD Tyler Roberts - [phone]    2017 2:15 PM Guru Benito Jr., DPM [facility] [phone]      Goals (5 Years of Data)     None      Follow-Up and Disposition     Return in about 6 months (around 10/27/2017) for proc B.      [facility]     [facility] On Call Nurse Care Line -  Assistance  Unless otherwise directed by your provider, please contact [facility], our nurse care line that is available for  assistance.     Registered nurses in the [facility] provide: appointment scheduling, clinical advisement, health education, and other advisory services.  Call: [phone] (toll free)               Medications           Message regarding Medications     Verify the changes and/or additions to your medication regime listed below are the same as discussed with your clinician today.  If any of these changes or additions are incorrect, please notify your healthcare provider.             Verify that the below list of medications is an accurate representation of the medications you are currently taking.  If none reported, the list may be blank. If incorrect, please contact your healthcare provider. Carry this list with you in case of emergency.           Current Medications     chlorpheniramine-pseudoephedrine-acetaminophen (SINUTAB) 2- mg per tablet Take 1  "tablet by mouth every 4 (four) hours as needed for Allergies.    ciclopirox (PENLAC) 8 % Soln Apply topically nightly.    duloxetine (CYMBALTA) 60 MG capsule Take 1 capsule (60 mg total) by mouth once daily.    ibuprofen (ADVIL,MOTRIN) 800 MG tablet Take 1 tablet (800 mg total) by mouth 3 (three) times daily.    oxycodone-acetaminophen (PERCOCET)  mg per tablet Take 1 tablet by mouth every 4 (four) hours as needed.    tizanidine (ZANAFLEX) 4 MG tablet Take 1 tablet (4 mg total) by mouth every 8 (eight) hours.           Clinical Reference Information           Your Vitals Were     BP Pulse Resp Height Weight Last Period    113/74 (BP Location: Left arm, Patient Position: Sitting, BP Method: Automatic) 80 18 5' 4" (1.626 m) 119.7 kg (264 lb) 10/08/2015 (Within Days)    BMI                45.32 kg/m2          Blood Pressure          Most Recent Value    BP  113/74      Allergies as of 4/27/2017     No Known Allergies      Immunizations Administered on Date of Encounter - 4/27/2017     None      Instructions      What Are Corns and Calluses?    Corns and calluses are your bodys response to friction or pressure against the skin. If your foot rubs the inside of your shoe, the affected area of skin thickens. Or, if a bone is not in the normal position, skin caught between bone and shoe or bone and ground builds up. In either case, the outer layer of skin thickens to protect the foot from unusual pressure. In many cases, corns and calluses look bad but are not harmful. However, more severe corns and calluses may become infected, destroy healthy tissue, or affect foot movement.    Corns  Corns usually grow on top of the foot, often at the toe joint. Corns can range from a slight thickening of skin to a painful soft or hard bump. They often form on top of buckled toe joints (hammer toes). If your toes curl under, corns may grow on the tips of the toes. You may also get a corn on the end of a toe if it rubs against your " shoe. Corns can also grow between toes, often between the first and second toes.    Calluses  Calluses grow on the bottom of the foot or on the outer edge of a toe or heel. A callus may spread across the ball of your foot. This type of callus is usually due to a problem with a metatarsal (the long bone at the base of a toe, near the ball of the foot). A pinch callus may grow along the outer edge of the heel or the big toe. Some calluses press up into the foot instead of spreading on the outside. A callus may form a central core or plug of tissue where pressure is greatest.  © 3967-6224 [employer]. [address] [place] [address], [place] [address]. All rights reserved. This information is not intended as a substitute for professional medical care. Always follow your healthcare professional's instructions.      Treating Corns and Calluses  If your corns or calluses are mild, reducing friction may help. Different shoes, moleskin patches, or soft pads may be all the treatment you need. In more severe cases, treating tissue buildup may require your doctors care. Sometimes custom-made shoe inserts (orthotics) or special pads are prescribed to reduce friction and pressure.    Change shoes  If you have corns, your doctor may suggest wearing shoes that have more toe room. This way, buckled joints are less likely to be pinched against the top of the shoe. If you have calluses, wearing a cushioned insole, arch support, or heel counter can help reduce friction.  Visit your doctor  In some cases, your doctor may trim away the outer layers of skin that make up the corn or callus. For a painful corn, medicine may be injected beneath the built-up tissue.  Wear orthotics  Orthotics are specially made to meet the needs of your feet. They cushion calluses or divert pressure away from these problem areas. Worn as directed, orthotics help limit existing problems and prevent new ones from forming.  If you need surgery  If a bone  or joint is out of place, certain parts of your foot may be under too much pressure. This can cause severe corns and calluses. In such cases, surgery may be the best way to correct the problem.  Outpatient procedures  In most cases, surgery to improve bone position is an outpatient procedure. Your doctor may cut away excess bone, reposition prominent bones, or even fuse joints. Sometimes tendons or ligaments are cut to reduce tension on a bone or joint. Your doctor will talk with you about the procedure that is best suited to your needs.  © 5046-9746 [employer]. [address] [place] [address]. All rights reserved. This information is not intended as a substitute for professional medical care. Always follow your healthcare professional's instructions.          Fungal Infection of Nails  Fungal infection of the nails changes the way fingernails and toenails look. They may thicken, discolor, change shape, or split. This condition is hard to treat because nails grow slowly and have limited blood supply. It is common for the infection to come back after treatment.  There are 2 types of medicines used.  · Topical anti-fungal medicines are applied to the surface of the skin and nail area. These medicines are not very effective because they cannot get deep into the nail.  · Topical medicines are most useful in combination with oral medicines. Oral antifungal medicines work better because they penetrate the nail from the inside out. However, recurrence still occurs and it may take 9 to 12 months to determine if you have been cured or not (i.e., for your nail to look normal again). You can repeat treatment if needed. Medications are well-tolerated and it is uncommon to need to stop therapy due to side effects, but your doctor may perform some monitoring tests. Discuss potential side effects with your doctor before starting treatment.  If medicines fail, the nail can be removed surgically or  chemically. This improves the effectiveness of medical treatment because the fungus is physically removed from the body.  Home care  The following will help you care for your infection at home:  1. Use medicines exactly as directed for as long as directed. Treating a fungal infection can take longer than other kinds of infections.  2. Smoking is a risk factor for fungal infection. This is one more reason to quit.  3. Wear absorbent socks and shoes that let your feet breathe. Sweaty feet increase risk of fungal infection and make an existing infection harder to treat.  4. Use footwear when in damp public places like swimming pools, gyms, and shower rooms. This will help you avoid the fungus that grows there.  5. Don't share nail clippers or scissors with others.  Follow-up care  Follow up with your doctor as directed by our staff.  When to seek medical care  Get prompt medical attention if any of the following occur:  · Skin by the nail becomes reddened, swollen, painful, or drains pus  · Side effects from oral anti-fungal medicines  © 7794-7215 [employer] [address] [place] [address]. All rights reserved. This information is not intended as a substitute for professional medical care. Always follow your healthcare professional's instructions.  Ciclopirox Olamine Topical solution  What is this medicine?  CICLOPIROX (sye kloe PEER ox) NAIL SOLUTION is an antifungal medicine. It used to treat fungal infections of the nails.  This medicine may be used for other purposes; ask your health care provider or pharmacist if you have questions.  What should I tell my health care provider before I take this medicine?  They need to know if you have any of these conditions:  · diabetes mellitus  · history of seizures  · HIV infection  · immune system problems or organ transplant  · large areas of burned or damaged skin  · peripheral vascular disease or poor circulation  · taking corticosteroid  medication (including steroid inhalers, cream, or lotion)  · an unusual or allergic reaction to ciclopirox, isopropyl alcohol, other medicines, foods, dyes, or preservatives  · pregnant or trying to get pregnant  · breast-feeding  How should I use this medicine?  This medicine is for external use only. Follow the directions that come with this medicine exactly. Wash and dry your hands before use. Avoid contact with the eyes, mouth or nose. If you do get this medicine in your eyes, rinse out with plenty of cool tap water. Contact your doctor or health care professional if eye irritation occurs. Use at regular intervals. Do not use your medicine more often than directed. Finish the full course prescribed by your doctor or health care professional even if you think you are better. Do not stop using except on your doctor's advice.  Talk to your pediatrician regarding the use of this medicine in children. While this medicine may be prescribed for children as young as 12 years for selected conditions, precautions do apply.  Overdosage: If you think you have taken too much of this medicine contact a poison control center or emergency room at once.  NOTE: This medicine is only for you. Do not share this medicine with others.  What if I miss a dose?  If you miss a dose, use it as soon as you can. If it is almost time for your next dose, use only that dose. Do not use double or extra doses.  What may interact with this medicine?  Interactions are not expected. Do not use any other skin products without telling your doctor or health care professional.  This list may not describe all possible interactions. Give your health care provider a list of all the medicines, herbs, non-prescription drugs, or dietary supplements you use. Also tell them if you smoke, drink alcohol, or use illegal drugs. Some items may interact with your medicine.  What should I watch for while using this medicine?  Tell your doctor or health care  professional if your symptoms get worse. Four to six months of treatment may be needed for the nail(s) to improve. Some people may not achieve a complete cure or clearing of the nails by this time.  Tell your doctor or health care professional if you develop sores or blisters that do not heal properly. If your nail infection returns after stopping using this product, contact your doctor or health care professional.  What side effects may I notice from receiving this medicine?  Side effects that you should report to your doctor or health care professional as soon as possible:  · allergic reactions like skin rash, itching or hives, swelling of the face, lips, or tongue  · severe irritation, redness, burning, blistering, peeling, swelling, oozing  Side effects that usually do not require medical attention (report to your doctor or health care professional if they continue or are bothersome):  · mild reddening of the skin  · nail discoloration  · temporary burning or mild stinging at the site of application  This list may not describe all possible side effects. Call your doctor for medical advice about side effects. You may report side effects to FDA at 5-153-FDA-1949.  Where should I keep my medicine?  Keep out of the reach of children.  Store at room temperature between 15 and 30 degrees C (59 and 86 degrees F). Do not freeze. Protect from light by storing the bottle in the carton after every use. This medicine is flammable. Keep away from heat and flame. Throw away any unused medicine after the expiration date.  NOTE:This sheet is a summary. It may not cover all possible information. If you have questions about this medicine, talk to your doctor, pharmacist, or health care provider. Copyright© 2016 [employer]        Understanding Heel Pain  Your heel is the back part of your foot. A band of tissue called the plantar fascia connects the heel bone to the bones in the ball of your foot. Nerves run from the heel up  the inside of your ankle and into your leg. When you feel pain in the bottom of your heel, the plantar fascia may be inflamed. Overuse, Achilles tightness, or excess body weight can cause the tissue to tear or pull away from the bone. Sometimes the inflamed plantar fascia also irritates a nerve, causing more pain.    What causes heel pain?  Wearing shoes with poor cushioning can irritate the tissue in your heel (plantar fascia). Being overweight or standing for long periods can also irritate the tissue. Running, walking, tennis, and other sports that put stress on the heels can cause tiny tears in the tissue. If your lower leg muscles are tight, this is more likely to occur. A tight Achilles tendon will also contribute to heel pain.  Symptoms  You may feel pain on the bottom or on the inside edge of your heel. The pain may be sharp when you get out of bed or when you stand up after sitting for a while. You may feel a dull ache in your heel after youve been standing for a long time on a hard surface. Running can also cause a dull ache.  Preventing future problems  To prevent future heel pain, wear shoes with well-cushioned heels. And do exercises prescribed by your healthcare provider to stretch the plantar fascia and the muscles in the lower leg.   Date Last Reviewed: 9/10/2015  © 2624-5524 [employer]. [address] [place] [address]. All rights reserved. This information is not intended as a substitute for professional medical care. Always follow your healthcare professional's instructions.        Ankle Dorsiflexion/Plantarflexion (Flexibility)    1. Sit on the floor or in bed with your legs straight in front of you.  2. Point both feet. Then flex both feet.  3. Do this 10 to 30 times in a row.  4. Repeat this exercise 2 times a day, or as instructed.  Date Last Reviewed: 5/1/2016  © 8435-0481 [employer]. [address] [place] [address]. All rights reserved. This  information is not intended as a substitute for professional medical care. Always follow your healthcare professional's instructions.      Arch retraining    These exercises are for your right foot. Switch sides for your left foot.  5. Sit in a chair or stand with both feet flat on the floor. Press down with the ball of your right foot, but only on the left side of the foot, just under the big toe.  6. Then pull the bottom of your big toe back toward your heel. This should pull up the arch of your foot. Dont flex your toes while doing this. It is a subtle movement of the arch.  7. Hold for 5 seconds. Relax.  Date Last Reviewed: 3/10/2016  © 3013-3712 [employer]. [address] [place] [address]. All rights reserved. This information is not intended as a substitute for professional medical care. Always follow your healthcare professional's instructions.        Recommended OTC orthotics:  -powerstep  -superfeet    Recommended shoegear:  -new balance  -ascics  -mizuno  -galarza      Soleus Stretch (Flexibility)    8. Stand facing a wall from 3 feet away. Take one step toward the wall with your right foot.  9. Place both palms on the wall. Bend both knees and lean forward. Keep both heels on the floor.  10. Hold for 30 to 60 seconds. Then relax both legs. Repeat the exercise 2 times.  11. Switch legs and repeat.  12. Repeat this exercise 3 times a day, or as instructed.     Tip: Dont bounce while youre stretching.   Date Last Reviewed: 3/10/2016  © 7701-0139 [employer]. [address] [place] [address]. All rights reserved. This information is not intended as a substitute for professional medical care. Always follow your healthcare professional's instructions.               Language Assistance Services     ATTENTION: Language assistance services are available, free of charge. Please call [phone].      ATENCIÓN: Si perlita fernández a duncan disposición servicios  brunaos de asistencia lingüística. Checo donovan [phone].     [facility] Ý: N?u b?n nói Ti?ng Vi?t, có các d?ch v? h? tr? ngôn ng? mi?n phí dành cho b?n. G?i s? [phone].         [facility] complies with applicable Federal civil rights laws and does not discriminate on the basis of race, color, national origin, age, disability, or sex.

## 2017-04-27 NOTE — PROGRESS NOTES
Subjective:    Patient ID: Indira Yousif is a 46 y.o. female.    Chief Complaint: Foot Pain (left heel)      HPI:   Indira is a 46 y.o. female who presents to the clinic complaining of thick and discolored toenails on both feet. Indira is inquiring about treatment options.    Pt also reports b/l foot pain. Denies trauma. Feels lesions on bottom of feet that she thinks are growing. No treatments so far. Also reports R heel Pain 4/10, post static dyskinesia.     Foot Pain         Last Podiatry Enc: Visit date not found  Last Enc w/ Me: Visit date not found    Past Medical History:   Diagnosis Date    Anxiety      Past Surgical History:   Procedure Laterality Date     SECTION      x2    CYST REMOVAL Left 1980    HERNIA REPAIR      HYSTERECTOMY      partial    TUBAL LIGATION       Social History     Social History    Marital status:      Spouse name: N/A    Number of children: N/A    Years of education: N/A     Occupational History    Not on file.     Social History Main Topics    Smoking status: Never Smoker    Smokeless tobacco: Not on file    Alcohol use 0.0 oz/week     0 Standard drinks or equivalent per week      Comment: socially    Drug use: No    Sexual activity: Yes     Partners: Male     Birth control/ protection: Surgical     Other Topics Concern    Not on file     Social History Narrative    Lives with  and 2 children (21,13). Grandbaby visits frequently. 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 Watch movies. Family life is a big component of her life. Raises rabbits, and garden.          Current Outpatient Prescriptions:     chlorpheniramine-pseudoephedrine-acetaminophen (SINUTAB) 2- mg per tablet, Take 1 tablet by mouth every 4 (four) hours as needed for Allergies., Disp: , Rfl:     ciclopirox (PENLAC) 8 % Soln, Apply topically nightly., Disp: 6.6 mL, Rfl: 3    duloxetine (CYMBALTA) 60 MG capsule, Take 1 capsule (60 mg total) by mouth once daily., Disp: 30 capsule, Rfl:  "11    ibuprofen (ADVIL,MOTRIN) 800 MG tablet, Take 1 tablet (800 mg total) by mouth 3 (three) times daily., Disp: 30 tablet, Rfl: 2    oxycodone-acetaminophen (PERCOCET)  mg per tablet, Take 1 tablet by mouth every 4 (four) hours as needed., Disp: 30 tablet, Rfl: 0  Review of patient's allergies indicates:  No Known Allergies    /74 (BP Location: Left arm, Patient Position: Sitting, BP Method: Automatic)  Pulse 80  Resp 18  Ht 5' 4" (1.626 m)  Wt 119.7 kg (264 lb)  LMP 10/08/2015 (Within Days)  BMI 45.32 kg/m2    ROS  ROS Constitutional:  General Appearance: well nourished  Vascular: negative for cramps, edema and bruising  Musculoskeletal: negative for joint paint and joint edema  Skin: negative for rashes and lesions  Neurological: negative for burning, tingling and numbness  Gastrointestinal: negative for stomach pain, nausea and vomiting        Objective:        Ortho/SPM Exam  Physical Exam    V: DP 1/4, PT 2/4, CRT< 3s to all digits tested.     N: SILT in SP/DP/T/Alissa/Saph distributions.    Derm: Skin intact. No erythema, edema or ecchymosis. + mild hyperkeratotic lesions L 5th met base and R plantar foot. Nails elongated, mycotic, and dystrophic x10.    Ortho:  +Motor EHL/FHL/TA/GA   +TTP L medial calc tubercle. No pain at hyperkeratotic lesions   Compartments soft/compressible. No pain on passive stretch of big toe. No calf  pain.        Assessment:     Imaging:     Narrative   Foot complete 3 views bilateral.    Findings: 8 views.  There is osseous spur at the Achilles and plantar fascia attachment to the calcaneus bilaterally.  There are mild hallux valgus deformities bilaterally.  There is no fracture, dislocation, or bony erosion.   Impression    As above.      Electronically signed by: MATHEW CRANDALL MD  Date: 01/10/17  Time: 15:52            1. Pain of foot, unspecified laterality    2. Corn or callus    3. Plantar fasciitis, left          Plan:       Orders Placed This Encounter    " dexamethasone injection 4 mg     .   Indira was seen today for foot pain.    Diagnoses and all orders for this visit:    Pain of foot, unspecified laterality  -     dexamethasone injection 4 mg; 1 mL (4 mg total) by Other route once.    Corn or callus    Plantar fasciitis, left        Indira Yousif is a 46 y.o. female presenting w/   1. Pain of foot, unspecified laterality    2. Corn or callus    3. Plantar fasciitis, left        -pt seen, evaluated, and managed  -dx discussed in detail. All questions/concerns addressed  -all tx options discussed. All alternatives, risks, benefits of all txs discussed  -The patient was educated regarding the above diagnosis. We discussed conservative care options regarding shoe wear and/or padding.  -rxs dispensed: penlac for fungal nails  -xr reviewed  -offloading pads for calluses  -rec'd OTC wart kit for calluses  -icing/stretching regimen  -after obtaining consent, performing timeout and prepping skin with JARRET pad, injected L PF with 1cc dex phos+2cc .5% marcaine plain w/o complication     rtc 8 wks for f/u L PF    Return in about 6 months (around 10/27/2017) for proc B.

## 2017-04-27 NOTE — PATIENT INSTRUCTIONS
What Are Corns and Calluses?    Corns and calluses are your bodys response to friction or pressure against the skin. If your foot rubs the inside of your shoe, the affected area of skin thickens. Or, if a bone is not in the normal position, skin caught between bone and shoe or bone and ground builds up. In either case, the outer layer of skin thickens to protect the foot from unusual pressure. In many cases, corns and calluses look bad but are not harmful. However, more severe corns and calluses may become infected, destroy healthy tissue, or affect foot movement.    Corns  Corns usually grow on top of the foot, often at the toe joint. Corns can range from a slight thickening of skin to a painful soft or hard bump. They often form on top of buckled toe joints (hammer toes). If your toes curl under, corns may grow on the tips of the toes. You may also get a corn on the end of a toe if it rubs against your shoe. Corns can also grow between toes, often between the first and second toes.    Calluses  Calluses grow on the bottom of the foot or on the outer edge of a toe or heel. A callus may spread across the ball of your foot. This type of callus is usually due to a problem with a metatarsal (the long bone at the base of a toe, near the ball of the foot). A pinch callus may grow along the outer edge of the heel or the big toe. Some calluses press up into the foot instead of spreading on the outside. A callus may form a central core or plug of tissue where pressure is greatest.  © 5148-1820 [employer]. [address] [place] [address]. All rights reserved. This information is not intended as a substitute for professional medical care. Always follow your healthcare professional's instructions.      Treating Corns and Calluses  If your corns or calluses are mild, reducing friction may help. Different shoes, moleskin patches, or soft pads may be all the treatment you need. In more severe cases,  treating tissue buildup may require your doctors care. Sometimes custom-made shoe inserts (orthotics) or special pads are prescribed to reduce friction and pressure.    Change shoes  If you have corns, your doctor may suggest wearing shoes that have more toe room. This way, buckled joints are less likely to be pinched against the top of the shoe. If you have calluses, wearing a cushioned insole, arch support, or heel counter can help reduce friction.  Visit your doctor  In some cases, your doctor may trim away the outer layers of skin that make up the corn or callus. For a painful corn, medicine may be injected beneath the built-up tissue.  Wear orthotics  Orthotics are specially made to meet the needs of your feet. They cushion calluses or divert pressure away from these problem areas. Worn as directed, orthotics help limit existing problems and prevent new ones from forming.  If you need surgery  If a bone or joint is out of place, certain parts of your foot may be under too much pressure. This can cause severe corns and calluses. In such cases, surgery may be the best way to correct the problem.  Outpatient procedures  In most cases, surgery to improve bone position is an outpatient procedure. Your doctor may cut away excess bone, reposition prominent bones, or even fuse joints. Sometimes tendons or ligaments are cut to reduce tension on a bone or joint. Your doctor will talk with you about the procedure that is best suited to your needs.  © 5264-0470 [employer]. [address] [place] [address], [place] [id number]. All rights reserved. This information is not intended as a substitute for professional medical care. Always follow your healthcare professional's instructions.          Fungal Infection of Nails  Fungal infection of the nails changes the way fingernails and toenails look. They may thicken, discolor, change shape, or split. This condition is hard to treat because nails grow slowly and have limited  blood supply. It is common for the infection to come back after treatment.  There are 2 types of medicines used.  · Topical anti-fungal medicines are applied to the surface of the skin and nail area. These medicines are not very effective because they cannot get deep into the nail.  · Topical medicines are most useful in combination with oral medicines. Oral antifungal medicines work better because they penetrate the nail from the inside out. However, recurrence still occurs and it may take 9 to 12 months to determine if you have been cured or not (i.e., for your nail to look normal again). You can repeat treatment if needed. Medications are well-tolerated and it is uncommon to need to stop therapy due to side effects, but your doctor may perform some monitoring tests. Discuss potential side effects with your doctor before starting treatment.  If medicines fail, the nail can be removed surgically or chemically. This improves the effectiveness of medical treatment because the fungus is physically removed from the body.  Home care  The following will help you care for your infection at home:  1. Use medicines exactly as directed for as long as directed. Treating a fungal infection can take longer than other kinds of infections.  2. Smoking is a risk factor for fungal infection. This is one more reason to quit.  3. Wear absorbent socks and shoes that let your feet breathe. Sweaty feet increase risk of fungal infection and make an existing infection harder to treat.  4. Use footwear when in damp public places like swimming pools, gyms, and shower rooms. This will help you avoid the fungus that grows there.  5. Don't share nail clippers or scissors with others.  Follow-up care  Follow up with your doctor as directed by our staff.  When to seek medical care  Get prompt medical attention if any of the following occur:  · Skin by the nail becomes reddened, swollen, painful, or drains pus  · Side effects from oral  anti-fungal medicines  © 3593-5521 [employer]. [address] [place] [address], [place] [address]. All rights reserved. This information is not intended as a substitute for professional medical care. Always follow your healthcare professional's instructions.  Ciclopirox Olamine Topical solution  What is this medicine?  CICLOPIROX (sye kloe PEER ox) NAIL SOLUTION is an antifungal medicine. It used to treat fungal infections of the nails.  This medicine may be used for other purposes; ask your health care provider or pharmacist if you have questions.  What should I tell my health care provider before I take this medicine?  They need to know if you have any of these conditions:  · diabetes mellitus  · history of seizures  · HIV infection  · immune system problems or organ transplant  · large areas of burned or damaged skin  · peripheral vascular disease or poor circulation  · taking corticosteroid medication (including steroid inhalers, cream, or lotion)  · an unusual or allergic reaction to ciclopirox, isopropyl alcohol, other medicines, foods, dyes, or preservatives  · pregnant or trying to get pregnant  · breast-feeding  How should I use this medicine?  This medicine is for external use only. Follow the directions that come with this medicine exactly. Wash and dry your hands before use. Avoid contact with the eyes, mouth or nose. If you do get this medicine in your eyes, rinse out with plenty of cool tap water. Contact your doctor or health care professional if eye irritation occurs. Use at regular intervals. Do not use your medicine more often than directed. Finish the full course prescribed by your doctor or health care professional even if you think you are better. Do not stop using except on your doctor's advice.  Talk to your pediatrician regarding the use of this medicine in children. While this medicine may be prescribed for children as young as 12 years for selected conditions, precautions do  apply.  Overdosage: If you think you have taken too much of this medicine contact a poison control center or emergency room at once.  NOTE: This medicine is only for you. Do not share this medicine with others.  What if I miss a dose?  If you miss a dose, use it as soon as you can. If it is almost time for your next dose, use only that dose. Do not use double or extra doses.  What may interact with this medicine?  Interactions are not expected. Do not use any other skin products without telling your doctor or health care professional.  This list may not describe all possible interactions. Give your health care provider a list of all the medicines, herbs, non-prescription drugs, or dietary supplements you use. Also tell them if you smoke, drink alcohol, or use illegal drugs. Some items may interact with your medicine.  What should I watch for while using this medicine?  Tell your doctor or health care professional if your symptoms get worse. Four to six months of treatment may be needed for the nail(s) to improve. Some people may not achieve a complete cure or clearing of the nails by this time.  Tell your doctor or health care professional if you develop sores or blisters that do not heal properly. If your nail infection returns after stopping using this product, contact your doctor or health care professional.  What side effects may I notice from receiving this medicine?  Side effects that you should report to your doctor or health care professional as soon as possible:  · allergic reactions like skin rash, itching or hives, swelling of the face, lips, or tongue  · severe irritation, redness, burning, blistering, peeling, swelling, oozing  Side effects that usually do not require medical attention (report to your doctor or health care professional if they continue or are bothersome):  · mild reddening of the skin  · nail discoloration  · temporary burning or mild stinging at the site of application  This list may  not describe all possible side effects. Call your doctor for medical advice about side effects. You may report side effects to FDA at 6-560-QNB-9284.  Where should I keep my medicine?  Keep out of the reach of children.  Store at room temperature between 15 and 30 degrees C (59 and 86 degrees F). Do not freeze. Protect from light by storing the bottle in the carton after every use. This medicine is flammable. Keep away from heat and flame. Throw away any unused medicine after the expiration date.  NOTE:This sheet is a summary. It may not cover all possible information. If you have questions about this medicine, talk to your doctor, pharmacist, or health care provider. Copyright© 2016 [employer]        Understanding Heel Pain  Your heel is the back part of your foot. A band of tissue called the plantar fascia connects the heel bone to the bones in the ball of your foot. Nerves run from the heel up the inside of your ankle and into your leg. When you feel pain in the bottom of your heel, the plantar fascia may be inflamed. Overuse, Achilles tightness, or excess body weight can cause the tissue to tear or pull away from the bone. Sometimes the inflamed plantar fascia also irritates a nerve, causing more pain.    What causes heel pain?  Wearing shoes with poor cushioning can irritate the tissue in your heel (plantar fascia). Being overweight or standing for long periods can also irritate the tissue. Running, walking, tennis, and other sports that put stress on the heels can cause tiny tears in the tissue. If your lower leg muscles are tight, this is more likely to occur. A tight Achilles tendon will also contribute to heel pain.  Symptoms  You may feel pain on the bottom or on the inside edge of your heel. The pain may be sharp when you get out of bed or when you stand up after sitting for a while. You may feel a dull ache in your heel after youve been standing for a long time on a hard surface. Running can also  cause a dull ache.  Preventing future problems  To prevent future heel pain, wear shoes with well-cushioned heels. And do exercises prescribed by your healthcare provider to stretch the plantar fascia and the muscles in the lower leg.   Date Last Reviewed: 9/10/2015  © 5512-9401 [employer]. [address] [place] [address]. All rights reserved. This information is not intended as a substitute for professional medical care. Always follow your healthcare professional's instructions.        Ankle Dorsiflexion/Plantarflexion (Flexibility)    1. Sit on the floor or in bed with your legs straight in front of you.  2. Point both feet. Then flex both feet.  3. Do this 10 to 30 times in a row.  4. Repeat this exercise 2 times a day, or as instructed.  Date Last Reviewed: 5/1/2016 © 2000-2016 [employer]. [address] [place] [address]. All rights reserved. This information is not intended as a substitute for professional medical care. Always follow your healthcare professional's instructions.      Arch retraining    These exercises are for your right foot. Switch sides for your left foot.  5. Sit in a chair or stand with both feet flat on the floor. Press down with the ball of your right foot, but only on the left side of the foot, just under the big toe.  6. Then pull the bottom of your big toe back toward your heel. This should pull up the arch of your foot. Dont flex your toes while doing this. It is a subtle movement of the arch.  7. Hold for 5 seconds. Relax.  Date Last Reviewed: 3/10/2016  © 3223-0322 [employer]. [address] [place] [address]. All rights reserved. This information is not intended as a substitute for professional medical care. Always follow your healthcare professional's instructions.        Recommended OTC orthotics:  -powerstep  -superfeet    Recommended shoegear:  -new balance  -ascics  -mizuno  -galarza      Soleus Stretch  (Flexibility)    8. Stand facing a wall from 3 feet away. Take one step toward the wall with your right foot.  9. Place both palms on the wall. Bend both knees and lean forward. Keep both heels on the floor.  10. Hold for 30 to 60 seconds. Then relax both legs. Repeat the exercise 2 times.  11. Switch legs and repeat.  12. Repeat this exercise 3 times a day, or as instructed.     Tip: Dont bounce while youre stretching.   Date Last Reviewed: 3/10/2016  © 8135-4328 [employer]. [address] [place] [address]. All rights reserved. This information is not intended as a substitute for professional medical care. Always follow your healthcare professional's instructions.

## 2017-05-01 RX ORDER — DEXAMETHASONE SODIUM PHOSPHATE 4 MG/ML
4 INJECTION, SOLUTION INTRA-ARTICULAR; INTRALESIONAL; INTRAMUSCULAR; INTRAVENOUS; SOFT TISSUE ONCE
Status: COMPLETED | OUTPATIENT
Start: 2017-05-01 | End: 2017-04-27

## 2017-05-15 ENCOUNTER — OFFICE VISIT (OUTPATIENT)
Dept: OBSTETRICS AND GYNECOLOGY | Facility: CLINIC | Age: 47
End: 2017-05-15
Payer: MEDICAID

## 2017-05-15 VITALS
BODY MASS INDEX: 44.53 KG/M2 | HEIGHT: 64 IN | WEIGHT: 260.81 LBS | SYSTOLIC BLOOD PRESSURE: 120 MMHG | DIASTOLIC BLOOD PRESSURE: 72 MMHG

## 2017-05-15 DIAGNOSIS — Z01.419 WELL WOMAN EXAM WITH ROUTINE GYNECOLOGICAL EXAM: Primary | ICD-10-CM

## 2017-05-15 PROCEDURE — 99213 OFFICE O/P EST LOW 20 MIN: CPT | Mod: PBBFAC,PO | Performed by: OBSTETRICS & GYNECOLOGY

## 2017-05-15 PROCEDURE — 99396 PREV VISIT EST AGE 40-64: CPT | Mod: S$PBB,,, | Performed by: OBSTETRICS & GYNECOLOGY

## 2017-05-15 PROCEDURE — 88175 CYTOPATH C/V AUTO FLUID REDO: CPT

## 2017-05-15 PROCEDURE — 99999 PR PBB SHADOW E&M-EST. PATIENT-LVL III: CPT | Mod: PBBFAC,,, | Performed by: OBSTETRICS & GYNECOLOGY

## 2017-05-15 RX ORDER — PHENTERMINE HYDROCHLORIDE 37.5 MG/1
37.5 TABLET ORAL
Qty: 30 TABLET | Refills: 2 | Status: SHIPPED | OUTPATIENT
Start: 2017-05-15 | End: 2017-05-17 | Stop reason: SDUPTHER

## 2017-05-15 RX ORDER — TIZANIDINE HYDROCHLORIDE 4 MG/1
CAPSULE, GELATIN COATED ORAL
COMMUNITY
End: 2023-06-19

## 2017-05-15 NOTE — MR AVS SNAPSHOT
[facility]  [address] [facility], [address]  [address]th [address] [place] [address]  Phone: [phone]                  Indira Yousif   5/15/2017 2:45 PM   Office Visit    Description:  Female : 1970   Provider:  Chaz Holloway MD   Department:  [facility]           Reason for Visit     Well Woman                To Do List           Future Appointments        Provider Department Dept Phone    2017 2:15 PM Guru Benito Jr., DPM [facility] [phone]      Goals (5 Years of Data)     None      [facility]     [facility] Care Line -  Assistance  Unless otherwise directed by your provider, please contact [facility], our nurse care line that is available for  assistance.     Registered nurses in the [facility] provide: appointment scheduling, clinical advisement, health education, and other advisory services.  Call: [phone] (toll free)               Medications           Message regarding Medications     Verify the changes and/or additions to your medication regime listed below are the same as discussed with your clinician today.  If any of these changes or additions are incorrect, please notify your healthcare provider.             Verify that the below list of medications is an accurate representation of the medications you are currently taking.  If none reported, the list may be blank. If incorrect, please contact your healthcare provider. Carry this list with you in case of emergency.           Current Medications     chlorpheniramine-pseudoephedrine-acetaminophen (SINUTAB) 2- mg per tablet Take 1 tablet by mouth every 4 (four) hours as needed for Allergies.    ciclopirox (PENLAC) 8 % Soln Apply topically nightly.    duloxetine (CYMBALTA) 60 MG capsule Take 1 capsule (60 mg total) by mouth once daily.    ibuprofen (ADVIL,MOTRIN) 800 MG tablet Take 1 tablet (800 mg total) by mouth 3 (three) times daily.     "oxycodone-acetaminophen (PERCOCET)  mg per tablet Take 1 tablet by mouth every 4 (four) hours as needed.    tizanidine 4 mg Cap Take by mouth.           Clinical Reference Information           Your Vitals Were     BP Height Weight Last Period BMI    120/72 5' 4" (1.626 m) 118.3 kg (260 lb 12.9 oz) 10/08/2015 (Within Days) 44.77 kg/m2      Blood Pressure          Most Recent Value    BP  120/72      Allergies as of 5/15/2017     No Known Allergies      Immunizations Administered on Date of Encounter - 5/15/2017     None      Language Assistance Services     ATTENTION: Language assistance services are available, free of charge. Please call [phone].      ATENCIÓN: Si doreen hinds, tiene a duncan disposición servicios gratuitos de asistencia lingüística. Llame al [phone].     CHÚ Ý: N?u b?n nói Ti?ng Vi?t, có các d?ch v? h? tr? ngôn ng? mi?n phí dành cho b?n. G?i s? [phone].         [facility] complies with applicable Federal civil rights laws and does not discriminate on the basis of race, color, national origin, age, disability, or sex.        "

## 2017-05-15 NOTE — PROGRESS NOTES
Subjective:       Patient ID: Indira Yousif is a 46 y.o. female.    Chief Complaint: Well Woman (no complaints)    Doing well, no GYN problems  Has gained weight---trying to reduce---ok to rx phentermine---counseled    HPI  Review of Systems   Gastrointestinal: Negative for abdominal distention, abdominal pain, constipation and nausea.   Genitourinary: Negative for dyspareunia, dysuria, genital sores, pelvic pain, vaginal bleeding and vaginal discharge.       Objective:      Physical Exam  PE:   APPEARANCE: Well nourished, well developed, in no acute distress.  SKIN: Normal skin turgor, no lesions.  NECK: Neck symmetric without thyromegaly.  NODES: No inguinal or cervical lymph node enlargement.  CHEST: Lungs clear to auscultation.  HEART: Regular rate and rhythm, no murmurs, rubs or gallops.  ABDOMEN: Soft. No tenderness or masses. No hernias.  BREASTS: Symmetrical, no skin changes or visible lesions. No palpable masses, nipple discharge or adenopathy bilaterally.  PELVIC: External female genitalia without lesions. Normal hair distribution. Adequate perineal body, normal urethral meatus. Vagina atrophic and not well rugated without lesions or discharge. Cervix and Uterus surgically absent. No significant cystocele or rectocele. Adnexa without masses or tenderness. Urethra and bladder normal.  EXTREMITIES: No edema.        Assessment:       1. Well woman exam with routine gynecological exam    2. BMI 40.0-44.9, adult        Plan:         annual gyn visit; pap and mammogram; no hormones needed; will rx phentermine

## 2017-05-16 ENCOUNTER — TELEPHONE (OUTPATIENT)
Dept: OBSTETRICS AND GYNECOLOGY | Facility: CLINIC | Age: 47
End: 2017-05-16

## 2017-05-16 NOTE — TELEPHONE ENCOUNTER
Patient states that insurance will not cover for Adipex She would like it sent to [facility] so it will be cheaper.    Please send rx of Adipex to [facility] if approved.

## 2017-05-16 NOTE — TELEPHONE ENCOUNTER
----- Message from Ashley Christensen sent at 5/16/2017  2:29 PM CDT -----  No. [phone]   Please call in Adipex to [facility] in [place].

## 2017-05-17 RX ORDER — PHENTERMINE HYDROCHLORIDE 37.5 MG/1
37.5 TABLET ORAL
Qty: 30 TABLET | Refills: 2 | Status: SHIPPED | OUTPATIENT
Start: 2017-05-17 | End: 2017-06-16

## 2017-05-22 RX ORDER — FLUCONAZOLE 150 MG/1
150 TABLET ORAL DAILY
Qty: 1 TABLET | Refills: 0 | Status: SHIPPED | OUTPATIENT
Start: 2017-05-22 | End: 2017-05-23

## 2017-07-31 ENCOUNTER — PATIENT MESSAGE (OUTPATIENT)
Dept: FAMILY MEDICINE | Facility: CLINIC | Age: 47
End: 2017-07-31

## 2017-08-29 ENCOUNTER — PATIENT MESSAGE (OUTPATIENT)
Dept: FAMILY MEDICINE | Facility: CLINIC | Age: 47
End: 2017-08-29

## 2017-08-29 RX ORDER — AMOXICILLIN 875 MG/1
875 TABLET, FILM COATED ORAL EVERY 12 HOURS
Qty: 20 TABLET | Refills: 0 | Status: SHIPPED | OUTPATIENT
Start: 2017-08-29 | End: 2017-10-10 | Stop reason: SDUPTHER

## 2017-09-27 RX ORDER — IBUPROFEN 800 MG/1
TABLET ORAL
Qty: 30 TABLET | Refills: 2 | Status: SHIPPED | OUTPATIENT
Start: 2017-09-27 | End: 2018-01-15 | Stop reason: SDUPTHER

## 2017-10-10 RX ORDER — AMOXICILLIN 875 MG/1
875 TABLET, FILM COATED ORAL EVERY 12 HOURS
Qty: 20 TABLET | Refills: 0 | Status: SHIPPED | OUTPATIENT
Start: 2017-10-10 | End: 2017-11-28 | Stop reason: SDUPTHER

## 2017-10-11 DIAGNOSIS — M23.301 DEGENERATIVE TEAR OF LATERAL MENISCUS OF LEFT KNEE: Primary | ICD-10-CM

## 2017-10-11 DIAGNOSIS — M51.26 OTHER INTERVERTEBRAL DISC DISPLACEMENT, LUMBAR REGION: Primary | ICD-10-CM

## 2017-11-28 RX ORDER — OXYCODONE AND ACETAMINOPHEN 10; 325 MG/1; MG/1
1 TABLET ORAL EVERY 4 HOURS PRN
Qty: 30 TABLET | Refills: 0 | Status: CANCELLED | OUTPATIENT
Start: 2017-11-28

## 2017-11-29 RX ORDER — AMOXICILLIN 875 MG/1
875 TABLET, FILM COATED ORAL EVERY 12 HOURS
Qty: 20 TABLET | Refills: 0 | Status: SHIPPED | OUTPATIENT
Start: 2017-11-29 | End: 2018-01-15 | Stop reason: SDUPTHER

## 2017-11-29 NOTE — TELEPHONE ENCOUNTER
This medication is prescribed to her by Dr. Tejeda. I am not prescribing oxycodone for chronic pain.

## 2018-01-03 ENCOUNTER — PATIENT MESSAGE (OUTPATIENT)
Dept: OBSTETRICS AND GYNECOLOGY | Facility: CLINIC | Age: 48
End: 2018-01-03

## 2018-01-15 ENCOUNTER — PATIENT MESSAGE (OUTPATIENT)
Dept: OBSTETRICS AND GYNECOLOGY | Facility: CLINIC | Age: 48
End: 2018-01-15

## 2018-01-15 RX ORDER — AMOXICILLIN 875 MG/1
875 TABLET, FILM COATED ORAL EVERY 12 HOURS
Qty: 20 TABLET | Refills: 0 | Status: SHIPPED | OUTPATIENT
Start: 2018-01-15 | End: 2018-04-05 | Stop reason: SDUPTHER

## 2018-01-15 RX ORDER — IBUPROFEN 800 MG/1
800 TABLET ORAL 3 TIMES DAILY
Qty: 30 TABLET | Refills: 2 | Status: SHIPPED | OUTPATIENT
Start: 2018-01-15 | End: 2018-04-05 | Stop reason: SDUPTHER

## 2018-01-15 RX ORDER — PHENTERMINE HYDROCHLORIDE 37.5 MG/1
37.5 TABLET ORAL
Qty: 30 TABLET | Refills: 2 | Status: SHIPPED | OUTPATIENT
Start: 2018-01-15 | End: 2018-02-14

## 2018-01-15 NOTE — TELEPHONE ENCOUNTER
Forwarded from patient via mychart:    I would like to see if Dr. Holloway would give me a prescription for adipex to help with my appetite and weight loss journey. This seems to be the only method that's helping me along with exercises    Please advise.

## 2018-01-23 ENCOUNTER — PATIENT MESSAGE (OUTPATIENT)
Dept: OBSTETRICS AND GYNECOLOGY | Facility: CLINIC | Age: 48
End: 2018-01-23

## 2018-01-23 PROBLEM — Z91.89 AT RISK FOR IMPAIRED MOBILITY DUE TO PAIN: Status: ACTIVE | Noted: 2018-01-23

## 2018-01-24 ENCOUNTER — PATIENT MESSAGE (OUTPATIENT)
Dept: OBSTETRICS AND GYNECOLOGY | Facility: CLINIC | Age: 48
End: 2018-01-24

## 2018-03-06 DIAGNOSIS — M47.812 FACET JOINT DISEASE OF CERVICAL REGION: ICD-10-CM

## 2018-03-06 DIAGNOSIS — M51.26 LUMBAR HERNIATED DISC: Primary | ICD-10-CM

## 2018-03-06 DIAGNOSIS — M54.6 PAIN IN THORACIC SPINE: ICD-10-CM

## 2018-04-05 RX ORDER — IBUPROFEN 800 MG/1
800 TABLET ORAL 3 TIMES DAILY
Qty: 30 TABLET | Refills: 2 | Status: SHIPPED | OUTPATIENT
Start: 2018-04-05 | End: 2019-07-05

## 2018-04-05 RX ORDER — AMOXICILLIN 875 MG/1
875 TABLET, FILM COATED ORAL EVERY 12 HOURS
Qty: 20 TABLET | Refills: 0 | Status: SHIPPED | OUTPATIENT
Start: 2018-04-05 | End: 2018-09-06

## 2018-07-25 ENCOUNTER — PATIENT MESSAGE (OUTPATIENT)
Dept: OBSTETRICS AND GYNECOLOGY | Facility: CLINIC | Age: 48
End: 2018-07-25

## 2018-07-25 NOTE — TELEPHONE ENCOUNTER
Pt wants to know if she Can get a refill for 1 month supply for my addipex...her last month prescription  and she didn't get a chance to get it fill. Pt last visit was 5/15/2017. Last refill for adipex was start day 1/15/2018 to end date 18

## 2018-07-26 ENCOUNTER — TELEPHONE (OUTPATIENT)
Dept: FAMILY MEDICINE | Facility: CLINIC | Age: 48
End: 2018-07-26

## 2018-09-06 ENCOUNTER — OFFICE VISIT (OUTPATIENT)
Dept: FAMILY MEDICINE | Facility: CLINIC | Age: 48
End: 2018-09-06
Payer: MEDICAID

## 2018-09-06 VITALS
OXYGEN SATURATION: 99 % | HEART RATE: 75 BPM | DIASTOLIC BLOOD PRESSURE: 78 MMHG | SYSTOLIC BLOOD PRESSURE: 124 MMHG | WEIGHT: 238 LBS | RESPIRATION RATE: 18 BRPM | TEMPERATURE: 99 F | HEIGHT: 64 IN | BODY MASS INDEX: 40.63 KG/M2

## 2018-09-06 DIAGNOSIS — Z00.00 ANNUAL PHYSICAL EXAM: ICD-10-CM

## 2018-09-06 DIAGNOSIS — M54.50 CHRONIC MIDLINE LOW BACK PAIN WITHOUT SCIATICA: Primary | ICD-10-CM

## 2018-09-06 DIAGNOSIS — G89.29 CHRONIC MIDLINE LOW BACK PAIN WITHOUT SCIATICA: Primary | ICD-10-CM

## 2018-09-06 DIAGNOSIS — H53.8 BLURRED VISION: ICD-10-CM

## 2018-09-06 DIAGNOSIS — R09.81 SINUS CONGESTION: ICD-10-CM

## 2018-09-06 PROCEDURE — 99999 PR PBB SHADOW E&M-EST. PATIENT-LVL V: CPT | Mod: PBBFAC,,, | Performed by: FAMILY MEDICINE

## 2018-09-06 PROCEDURE — 99215 OFFICE O/P EST HI 40 MIN: CPT | Mod: PBBFAC,PN | Performed by: FAMILY MEDICINE

## 2018-09-06 PROCEDURE — 99214 OFFICE O/P EST MOD 30 MIN: CPT | Mod: S$PBB,,, | Performed by: FAMILY MEDICINE

## 2018-09-06 RX ORDER — OXYCODONE AND ACETAMINOPHEN 10; 325 MG/1; MG/1
1 TABLET ORAL EVERY 4 HOURS PRN
Qty: 30 TABLET | Refills: 0 | Status: SHIPPED | OUTPATIENT
Start: 2018-09-06 | End: 2018-09-06 | Stop reason: SDUPTHER

## 2018-09-06 RX ORDER — OXYCODONE AND ACETAMINOPHEN 10; 325 MG/1; MG/1
1 TABLET ORAL EVERY 4 HOURS PRN
Qty: 30 TABLET | Refills: 0 | Status: SHIPPED | OUTPATIENT
Start: 2018-09-06 | End: 2018-10-17 | Stop reason: SDUPTHER

## 2018-09-06 NOTE — PROGRESS NOTES
HPI:  Indira Yousif is a 47 y.o. year old female that presents to [facility]. She has chronic allergy problems. She has been taking Robitussin and sinus relief tablets. She has not had relief from Flonase.  She also has chronic back pain and Dr. Graham has given her percocet and Advil at times. She has been on her feet a lot over the weekend.  Chief Complaint   Patient presents with    [facility] [place] [facility]     pt would like to talk about FMLA    Sinus Problem   .     HPI    Past Medical History:   Diagnosis Date    Anxiety      Social History     Socioeconomic History    Marital status:      Spouse name: Not on file    Number of children: Not on file    Years of education: Not on file    Highest education level: Not on file   Social Needs    Financial resource strain: Not on file    Food insecurity - worry: Not on file    Food insecurity - inability: Not on file    Transportation needs - medical: Not on file    Transportation needs - non-medical: Not on file   Occupational History    Not on file   Tobacco Use    Smoking status: Never Smoker    Smokeless tobacco: Never Used   Substance and Sexual Activity    Alcohol use: Yes     Alcohol/week: 0.0 oz     Comment: socially    Drug use: No    Sexual activity: Yes     Partners: Male     Birth control/protection: Surgical   Other Topics Concern    Not on file   Social History Narrative    Lives with  and 2 children (21,13). Grandbaby visits frequently. 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 Watch movies. Family life is a big component of her life. Raises rabbits, and garden.      Past Surgical History:   Procedure Laterality Date     SECTION      x2    CYST REMOVAL Left 1980    HERNIA REPAIR      HYSTERECTOMY      partial    HYSTERECTOMY-ABDOMINAL-TOTAL (GUICHO) N/A 10/26/2015    Performed by [facility] at [facility] OR    TUBAL LIGATION       Family History   Problem Relation Age of Onset    Hypertension Mother     Diabetes  "Mother     Hypertension Father     No Known Problems Sister     No Known Problems Brother     No Known Problems Son     No Known Problems Brother     No Known Problems Brother     No Known Problems Brother     No Known Problems Son     Cancer Neg Hx            Review of Systems  General ROS: negative for chills, fever or weight loss  Psychological ROS: negative for hallucination, depression or suicidal ideation  Ophthalmic ROS: negative for blurry vision, photophobia or eye pain  ENT ROS: negative for epistaxis, sore throat or rhinorrhea  Respiratory ROS: no cough, shortness of breath, or wheezing  Cardiovascular ROS: no chest pain or dyspnea on exertion  Gastrointestinal ROS: no abdominal pain, change in bowel habits, or black/ bloody stools  Genito-Urinary ROS: no dysuria, trouble voiding, or hematuria  Musculoskeletal ROS: negative for gait disturbance or muscular weakness  Neurological ROS: no syncope or seizures; no ataxia  Dermatological ROS: negative for pruritis, rash and jaundice      Physical Exam:  /78   Pulse 75   Temp 98.5 °F (36.9 °C) (Oral)   Resp 18   Ht 5' 4" (1.626 m)   Wt 108 kg (238 lb)   LMP 10/26/2015   SpO2 99%   BMI 40.85 kg/m²   General appearance: alert, cooperative, no distress  Constitutional:Oriented to person, place, and time.appears well-developed and well-nourished.  HEENT: Normocephalic, atraumatic, neck symmetrical, no nasal discharge, TM - clear bilaterally, pos allergic shiners   Eyes: conjunctivae/corneas clear, PERRL, EOM's intact  Lungs: clear to auscultation bilaterally, no dullness to percussion bilaterally  Heart: regular rate and rhythm without rub; no displacement of the PMI   Abdomen: soft, non-tender; bowel sounds normoactive; no organomegaly  Extremities: extremities symmetric; no clubbing, cyanosis, or edema  Integument: Skin color, texture, turgor normal; no rashes; hair distrubution normal  Neurologic: Alert and oriented X 3, normal strength, " normal coordination and gait  Psychiatric: no pressured speech; normal affect; no evidence of impaired cognition   Physical Exam  LABS:    Complete Blood Count  Lab Results   Component Value Date    RBC 4.94 07/26/2018    HGB 12.7 07/26/2018    HCT 38.2 07/26/2018    MCV 77 (L) 07/26/2018    MCH 25.7 (L) 07/26/2018    MCHC 33.2 07/26/2018    RDW 14.4 07/26/2018     07/26/2018    MPV 12.3 07/26/2018    GRAN 5.9 07/26/2018    GRAN 78.5 (H) 07/26/2018    LYMPH 1.2 07/26/2018    LYMPH 16.3 (L) 07/26/2018    MONO 0.4 07/26/2018    MONO 4.9 07/26/2018    EOS 0.0 07/26/2018    BASO 0.02 07/26/2018    EOSINOPHIL 0.0 07/26/2018    BASOPHIL 0.3 07/26/2018    DIFFMETHOD Automated 07/26/2018       Comprehensive Metabolic Panel  Lab Results   Component Value Date     (H) 07/26/2018    BUN 10 07/26/2018    CREATININE 0.60 07/26/2018     07/26/2018    K 4.1 07/26/2018     07/26/2018    PROT 8.3 07/26/2018    ALBUMIN 4.4 07/26/2018    BILITOT 0.7 07/26/2018    AST 20 07/26/2018    ALKPHOS 79 07/26/2018    CO2 22 (L) 07/26/2018    ALT 25 07/26/2018    ANIONGAP 11 07/26/2018    EGFRNONAA >60.0 07/26/2018    ESTGFRAFRICA >60.0 07/26/2018       LIPID  Lab Results   Component Value Date    CHOL 190 09/07/2018    HDL 72 09/07/2018       TSH  No results found for: TSH    Current Outpatient Medications   Medication Sig Dispense Refill    aluminum-magnesium hydroxide-simethicone (MAALOX ADVANCED) 200-200-20 mg/5 mL Susp Take 30 mLs by mouth every 6 (six) hours as needed (indigestion). 300 mL 0    chlorpheniramine-pseudoephedrine-acetaminophen (SINUTAB) 2- mg per tablet Take 1 tablet by mouth every 4 (four) hours as needed for Allergies.      ciclopirox (PENLAC) 8 % Soln Apply topically nightly. 6.6 mL 3    duloxetine (CYMBALTA) 60 MG capsule Take 1 capsule (60 mg total) by mouth once daily. 30 capsule 11    famotidine (PEPCID) 20 MG tablet Take 1 tablet (20 mg total) by mouth 2 (two) times daily. 60  tablet 0    ibuprofen (ADVIL,MOTRIN) 800 MG tablet Take 1 tablet (800 mg total) by mouth 3 (three) times daily. 30 tablet 2    ondansetron (ZOFRAN-ODT) 4 MG TbDL Take 1 tablet (4 mg total) by mouth every 8 (eight) hours as needed. 12 tablet 0    tizanidine 4 mg Cap Take by mouth.      oxyCODONE-acetaminophen (PERCOCET)  mg per tablet Take 1 tablet by mouth every 4 (four) hours as needed. 30 tablet 0     No current facility-administered medications for this visit.        Assessment:    ICD-10-CM ICD-9-CM    1. Chronic midline low back pain without sciatica M54.5 724.2 Ambulatory Referral to Pain Clinic    G89.29 338.29    2. Sinus congestion R09.81 478.19 Ambulatory Referral to ENT   3. Annual physical exam Z00.00 V70.0 Lipid panel   4. Blurred vision H53.8 368.8 Ambulatory Referral to Optometry         Plan:    Follow-up in 3 months (on 12/6/2018).          Tammie Felipe MD

## 2018-09-14 ENCOUNTER — PATIENT MESSAGE (OUTPATIENT)
Dept: FAMILY MEDICINE | Facility: CLINIC | Age: 48
End: 2018-09-14

## 2018-09-24 ENCOUNTER — PATIENT MESSAGE (OUTPATIENT)
Dept: FAMILY MEDICINE | Facility: CLINIC | Age: 48
End: 2018-09-24

## 2018-10-17 ENCOUNTER — PATIENT MESSAGE (OUTPATIENT)
Dept: FAMILY MEDICINE | Facility: CLINIC | Age: 48
End: 2018-10-17

## 2018-10-17 DIAGNOSIS — M54.6 CHRONIC LEFT-SIDED THORACIC BACK PAIN: Primary | ICD-10-CM

## 2018-10-17 DIAGNOSIS — M47.899 FACET SYNDROME: ICD-10-CM

## 2018-10-17 DIAGNOSIS — G89.29 CHRONIC LEFT-SIDED THORACIC BACK PAIN: Primary | ICD-10-CM

## 2018-10-18 ENCOUNTER — PATIENT MESSAGE (OUTPATIENT)
Dept: FAMILY MEDICINE | Facility: CLINIC | Age: 48
End: 2018-10-18

## 2018-10-19 ENCOUNTER — PATIENT MESSAGE (OUTPATIENT)
Dept: FAMILY MEDICINE | Facility: CLINIC | Age: 48
End: 2018-10-19

## 2018-10-19 RX ORDER — OXYCODONE AND ACETAMINOPHEN 10; 325 MG/1; MG/1
1 TABLET ORAL EVERY 4 HOURS PRN
Qty: 30 TABLET | Refills: 0 | Status: SHIPPED | OUTPATIENT
Start: 2018-10-19 | End: 2018-12-04 | Stop reason: SDUPTHER

## 2018-10-21 ENCOUNTER — PATIENT MESSAGE (OUTPATIENT)
Dept: FAMILY MEDICINE | Facility: CLINIC | Age: 48
End: 2018-10-21

## 2018-10-25 ENCOUNTER — PATIENT MESSAGE (OUTPATIENT)
Dept: FAMILY MEDICINE | Facility: CLINIC | Age: 48
End: 2018-10-25

## 2018-10-29 ENCOUNTER — PATIENT MESSAGE (OUTPATIENT)
Dept: FAMILY MEDICINE | Facility: CLINIC | Age: 48
End: 2018-10-29

## 2018-10-30 ENCOUNTER — TELEPHONE (OUTPATIENT)
Dept: OTOLARYNGOLOGY | Facility: CLINIC | Age: 48
End: 2018-10-30

## 2018-10-30 NOTE — TELEPHONE ENCOUNTER
----- Message from Johanne Baugh sent at 10/30/2018  8:49 AM CDT -----  Contact: [facility]  Message from [facility] Message sent at 10/29/2018 12:10 PM CDT -----    Appointment Request From: Indira Yousif    With Provider: Paulette Haddad MD [[facility]]    Preferred Date Range: Any date 11/9/2018 or later    Preferred Times: Monday Afternoon, Tuesday Afternoon, Wednesday Afternoon, Thursday Afternoon, Friday Afternoon    Reason for visit: Existing Patient    Comments:  After lunch

## 2018-10-31 ENCOUNTER — TELEPHONE (OUTPATIENT)
Dept: FAMILY MEDICINE | Facility: CLINIC | Age: 48
End: 2018-10-31

## 2018-10-31 ENCOUNTER — PATIENT MESSAGE (OUTPATIENT)
Dept: FAMILY MEDICINE | Facility: CLINIC | Age: 48
End: 2018-10-31

## 2018-10-31 NOTE — TELEPHONE ENCOUNTER
----- Message from Nahomi Retana sent at 10/31/2018  2:02 PM CDT -----  Contact: Self [phone]  Patient is calling to talk to nurse in regards to her FMLA paper work. Please advice

## 2018-10-31 NOTE — TELEPHONE ENCOUNTER
Spoke to patient, informed her the [facility] papers for her were sent in September. Patient asked for copy of of [facility] papers, sent to number provided [phone]

## 2018-11-01 ENCOUNTER — PATIENT MESSAGE (OUTPATIENT)
Dept: FAMILY MEDICINE | Facility: CLINIC | Age: 48
End: 2018-11-01

## 2018-12-02 DIAGNOSIS — M54.6 CHRONIC LEFT-SIDED THORACIC BACK PAIN: ICD-10-CM

## 2018-12-02 DIAGNOSIS — G89.29 CHRONIC LEFT-SIDED THORACIC BACK PAIN: ICD-10-CM

## 2018-12-02 DIAGNOSIS — M47.899 FACET SYNDROME: ICD-10-CM

## 2018-12-03 RX ORDER — OXYCODONE AND ACETAMINOPHEN 10; 325 MG/1; MG/1
1 TABLET ORAL EVERY 4 HOURS PRN
Qty: 30 TABLET | Refills: 0 | Status: CANCELLED | OUTPATIENT
Start: 2018-12-03

## 2018-12-04 ENCOUNTER — PATIENT MESSAGE (OUTPATIENT)
Dept: FAMILY MEDICINE | Facility: CLINIC | Age: 48
End: 2018-12-04

## 2018-12-04 DIAGNOSIS — G89.29 CHRONIC LEFT-SIDED THORACIC BACK PAIN: ICD-10-CM

## 2018-12-04 DIAGNOSIS — M47.899 FACET SYNDROME: ICD-10-CM

## 2018-12-04 DIAGNOSIS — M54.6 CHRONIC LEFT-SIDED THORACIC BACK PAIN: ICD-10-CM

## 2018-12-07 RX ORDER — OXYCODONE AND ACETAMINOPHEN 10; 325 MG/1; MG/1
1 TABLET ORAL EVERY 4 HOURS PRN
Qty: 30 TABLET | Refills: 0 | Status: SHIPPED | OUTPATIENT
Start: 2018-12-07 | End: 2019-07-05

## 2018-12-21 DIAGNOSIS — Z12.39 BREAST CANCER SCREENING: ICD-10-CM

## 2019-03-23 DIAGNOSIS — G89.29 CHRONIC LEFT-SIDED THORACIC BACK PAIN: ICD-10-CM

## 2019-03-23 DIAGNOSIS — M54.6 CHRONIC LEFT-SIDED THORACIC BACK PAIN: ICD-10-CM

## 2019-03-23 DIAGNOSIS — M47.899 FACET SYNDROME: ICD-10-CM

## 2019-03-23 RX ORDER — OXYCODONE AND ACETAMINOPHEN 10; 325 MG/1; MG/1
1 TABLET ORAL EVERY 4 HOURS PRN
Qty: 30 TABLET | Refills: 0 | Status: CANCELLED | OUTPATIENT
Start: 2019-03-23

## 2019-03-26 NOTE — TELEPHONE ENCOUNTER
This pt was suppose to be with a pain clinic so this refill is not appropriate.Please call pt and get update about pain management that was executed in 12/2018 note

## 2019-03-28 ENCOUNTER — PATIENT MESSAGE (OUTPATIENT)
Dept: OBSTETRICS AND GYNECOLOGY | Facility: CLINIC | Age: 49
End: 2019-03-28

## 2019-04-01 DIAGNOSIS — M47.899 FACET SYNDROME: ICD-10-CM

## 2019-04-01 DIAGNOSIS — M54.6 CHRONIC LEFT-SIDED THORACIC BACK PAIN: ICD-10-CM

## 2019-04-01 DIAGNOSIS — G89.29 CHRONIC LEFT-SIDED THORACIC BACK PAIN: ICD-10-CM

## 2019-04-01 RX ORDER — OXYCODONE AND ACETAMINOPHEN 10; 325 MG/1; MG/1
1 TABLET ORAL EVERY 4 HOURS PRN
Qty: 30 TABLET | Refills: 0 | Status: CANCELLED | OUTPATIENT
Start: 2019-04-01

## 2019-04-02 RX ORDER — PHENTERMINE HYDROCHLORIDE 37.5 MG/1
37.5 TABLET ORAL
Qty: 30 TABLET | Refills: 0 | Status: SHIPPED | OUTPATIENT
Start: 2019-04-02 | End: 2019-10-27

## 2019-11-24 ENCOUNTER — PATIENT MESSAGE (OUTPATIENT)
Dept: OBSTETRICS AND GYNECOLOGY | Facility: CLINIC | Age: 49
End: 2019-11-24

## 2021-03-16 ENCOUNTER — TELEPHONE (OUTPATIENT)
Dept: PODIATRY | Facility: CLINIC | Age: 51
End: 2021-03-16

## 2021-05-05 ENCOUNTER — OFFICE VISIT (OUTPATIENT)
Dept: PODIATRY | Facility: CLINIC | Age: 51
End: 2021-05-05
Payer: MEDICAID

## 2021-05-05 VITALS — DIASTOLIC BLOOD PRESSURE: 88 MMHG | HEART RATE: 78 BPM | SYSTOLIC BLOOD PRESSURE: 161 MMHG

## 2021-05-05 DIAGNOSIS — L84 CORN OR CALLUS: ICD-10-CM

## 2021-05-05 DIAGNOSIS — M79.672 FOOT PAIN, BILATERAL: Primary | ICD-10-CM

## 2021-05-05 DIAGNOSIS — M79.671 FOOT PAIN, BILATERAL: Primary | ICD-10-CM

## 2021-05-05 DIAGNOSIS — M20.5X2 HALLUX LIMITUS, ACQUIRED, LEFT: ICD-10-CM

## 2021-05-05 DIAGNOSIS — M20.5X1 HALLUX LIMITUS, ACQUIRED, RIGHT: ICD-10-CM

## 2021-05-05 DIAGNOSIS — M20.41 HAMMER TOES OF BOTH FEET: ICD-10-CM

## 2021-05-05 DIAGNOSIS — M20.42 HAMMER TOES OF BOTH FEET: ICD-10-CM

## 2021-05-05 DIAGNOSIS — M21.6X2 ACQUIRED EQUINUS DEFORMITY OF BOTH FEET: ICD-10-CM

## 2021-05-05 DIAGNOSIS — M21.6X1 ACQUIRED EQUINUS DEFORMITY OF BOTH FEET: ICD-10-CM

## 2021-05-05 DIAGNOSIS — M72.2 PLANTAR FASCIITIS, RIGHT: ICD-10-CM

## 2021-05-05 PROCEDURE — 99204 OFFICE O/P NEW MOD 45 MIN: CPT | Mod: S$PBB,,, | Performed by: PODIATRIST

## 2021-05-05 PROCEDURE — 99999 PR PBB SHADOW E&M-EST. PATIENT-LVL III: CPT | Mod: PBBFAC,,, | Performed by: PODIATRIST

## 2021-05-05 PROCEDURE — 99204 PR OFFICE/OUTPT VISIT, NEW, LEVL IV, 45-59 MIN: ICD-10-PCS | Mod: S$PBB,,, | Performed by: PODIATRIST

## 2021-05-05 PROCEDURE — 99213 OFFICE O/P EST LOW 20 MIN: CPT | Mod: PBBFAC,PO | Performed by: PODIATRIST

## 2021-05-05 PROCEDURE — 99999 PR PBB SHADOW E&M-EST. PATIENT-LVL III: ICD-10-PCS | Mod: PBBFAC,,, | Performed by: PODIATRIST

## 2021-05-05 RX ORDER — MELOXICAM 15 MG/1
15 TABLET ORAL DAILY
Qty: 30 TABLET | Refills: 1 | Status: SHIPPED | OUTPATIENT
Start: 2021-05-05 | End: 2021-07-13 | Stop reason: SDUPTHER

## 2021-07-13 RX ORDER — MELOXICAM 15 MG/1
15 TABLET ORAL DAILY
Qty: 30 TABLET | Refills: 1 | Status: SHIPPED | OUTPATIENT
Start: 2021-07-13 | End: 2022-07-13

## 2021-12-08 ENCOUNTER — CLINICAL SUPPORT (OUTPATIENT)
Dept: OTHER | Facility: CLINIC | Age: 51
End: 2021-12-08
Payer: MEDICAID

## 2021-12-08 DIAGNOSIS — Z00.8 ENCOUNTER FOR OTHER GENERAL EXAMINATION: ICD-10-CM

## 2021-12-09 VITALS — HEIGHT: 64 IN | BODY MASS INDEX: 41.85 KG/M2

## 2021-12-09 LAB
HDLC SERPL-MCNC: 71 MG/DL
POC CHOLESTEROL, TOTAL: 196 MG/DL
POC GLUCOSE, FASTING: 89 MG/DL (ref 60–110)
TRIGL SERPL-MCNC: 44 MG/DL

## 2022-08-08 ENCOUNTER — OFFICE VISIT (OUTPATIENT)
Dept: INTERNAL MEDICINE | Facility: CLINIC | Age: 52
End: 2022-08-08
Payer: COMMERCIAL

## 2022-08-08 ENCOUNTER — TELEPHONE (OUTPATIENT)
Dept: INTERNAL MEDICINE | Facility: CLINIC | Age: 52
End: 2022-08-08
Payer: COMMERCIAL

## 2022-08-08 ENCOUNTER — DOCUMENTATION ONLY (OUTPATIENT)
Dept: INTERNAL MEDICINE | Facility: CLINIC | Age: 52
End: 2022-08-08
Payer: COMMERCIAL

## 2022-08-08 ENCOUNTER — OFFICE VISIT (OUTPATIENT)
Dept: OTOLARYNGOLOGY | Facility: CLINIC | Age: 52
End: 2022-08-08
Payer: COMMERCIAL

## 2022-08-08 VITALS
SYSTOLIC BLOOD PRESSURE: 136 MMHG | BODY MASS INDEX: 45.2 KG/M2 | TEMPERATURE: 99 F | DIASTOLIC BLOOD PRESSURE: 74 MMHG | OXYGEN SATURATION: 99 % | HEIGHT: 64 IN | WEIGHT: 264.75 LBS | HEART RATE: 80 BPM

## 2022-08-08 VITALS
SYSTOLIC BLOOD PRESSURE: 160 MMHG | WEIGHT: 264.31 LBS | BODY MASS INDEX: 46.83 KG/M2 | HEIGHT: 63 IN | HEART RATE: 70 BPM | DIASTOLIC BLOOD PRESSURE: 93 MMHG

## 2022-08-08 DIAGNOSIS — Z12.11 SCREEN FOR COLON CANCER: ICD-10-CM

## 2022-08-08 DIAGNOSIS — Z12.31 SCREENING MAMMOGRAM, ENCOUNTER FOR: ICD-10-CM

## 2022-08-08 DIAGNOSIS — T16.2XXA FOREIGN BODY OF LEFT EAR, INITIAL ENCOUNTER: Primary | ICD-10-CM

## 2022-08-08 DIAGNOSIS — J30.9 ALLERGIC RHINITIS, UNSPECIFIED SEASONALITY, UNSPECIFIED TRIGGER: ICD-10-CM

## 2022-08-08 DIAGNOSIS — G89.29 CHRONIC BILATERAL LOW BACK PAIN WITHOUT SCIATICA: ICD-10-CM

## 2022-08-08 DIAGNOSIS — R05.3 CHRONIC COUGH: ICD-10-CM

## 2022-08-08 DIAGNOSIS — Z00.00 ANNUAL PHYSICAL EXAM: Primary | ICD-10-CM

## 2022-08-08 DIAGNOSIS — I87.2 CHRONIC VENOUS INSUFFICIENCY: ICD-10-CM

## 2022-08-08 DIAGNOSIS — R73.01 IMPAIRED FASTING GLUCOSE: ICD-10-CM

## 2022-08-08 DIAGNOSIS — M54.50 CHRONIC BILATERAL LOW BACK PAIN WITHOUT SCIATICA: Primary | ICD-10-CM

## 2022-08-08 DIAGNOSIS — M54.50 CHRONIC BILATERAL LOW BACK PAIN WITHOUT SCIATICA: ICD-10-CM

## 2022-08-08 DIAGNOSIS — E66.01 MORBID OBESITY: ICD-10-CM

## 2022-08-08 DIAGNOSIS — H92.02 OTALGIA OF LEFT EAR: ICD-10-CM

## 2022-08-08 DIAGNOSIS — G89.29 CHRONIC BILATERAL LOW BACK PAIN WITHOUT SCIATICA: Primary | ICD-10-CM

## 2022-08-08 PROCEDURE — 3008F PR BODY MASS INDEX (BMI) DOCUMENTED: ICD-10-PCS | Mod: CPTII,,, | Performed by: INTERNAL MEDICINE

## 2022-08-08 PROCEDURE — 3080F DIAST BP >= 90 MM HG: CPT | Mod: CPTII,S$GLB,, | Performed by: OTOLARYNGOLOGY

## 2022-08-08 PROCEDURE — 3077F PR MOST RECENT SYSTOLIC BLOOD PRESSURE >= 140 MM HG: ICD-10-PCS | Mod: CPTII,S$GLB,, | Performed by: OTOLARYNGOLOGY

## 2022-08-08 PROCEDURE — 69200 CLEAR OUTER EAR CANAL: CPT | Mod: PBBFAC,PO,LT | Performed by: OTOLARYNGOLOGY

## 2022-08-08 PROCEDURE — 3075F PR MOST RECENT SYSTOLIC BLOOD PRESS GE 130-139MM HG: ICD-10-PCS | Mod: CPTII,,, | Performed by: INTERNAL MEDICINE

## 2022-08-08 PROCEDURE — 3075F SYST BP GE 130 - 139MM HG: CPT | Mod: CPTII,,, | Performed by: INTERNAL MEDICINE

## 2022-08-08 PROCEDURE — 99204 PR OFFICE/OUTPT VISIT, NEW, LEVL IV, 45-59 MIN: ICD-10-PCS | Mod: 25,S$GLB,, | Performed by: OTOLARYNGOLOGY

## 2022-08-08 PROCEDURE — 1159F MED LIST DOCD IN RCRD: CPT | Mod: CPTII,S$GLB,, | Performed by: OTOLARYNGOLOGY

## 2022-08-08 PROCEDURE — 99999 PR PBB SHADOW E&M-EST. PATIENT-LVL III: CPT | Mod: PBBFAC,,, | Performed by: OTOLARYNGOLOGY

## 2022-08-08 PROCEDURE — 3008F BODY MASS INDEX DOCD: CPT | Mod: CPTII,S$GLB,, | Performed by: OTOLARYNGOLOGY

## 2022-08-08 PROCEDURE — 3078F PR MOST RECENT DIASTOLIC BLOOD PRESSURE < 80 MM HG: ICD-10-PCS | Mod: CPTII,,, | Performed by: INTERNAL MEDICINE

## 2022-08-08 PROCEDURE — 3008F PR BODY MASS INDEX (BMI) DOCUMENTED: ICD-10-PCS | Mod: CPTII,S$GLB,, | Performed by: OTOLARYNGOLOGY

## 2022-08-08 PROCEDURE — 1159F PR MEDICATION LIST DOCUMENTED IN MEDICAL RECORD: ICD-10-PCS | Mod: CPTII,S$GLB,, | Performed by: OTOLARYNGOLOGY

## 2022-08-08 PROCEDURE — 99204 OFFICE O/P NEW MOD 45 MIN: CPT | Mod: 25,S$GLB,, | Performed by: OTOLARYNGOLOGY

## 2022-08-08 PROCEDURE — 99213 OFFICE O/P EST LOW 20 MIN: CPT | Mod: PBBFAC,PO,25 | Performed by: OTOLARYNGOLOGY

## 2022-08-08 PROCEDURE — 99999 PR PBB SHADOW E&M-EST. PATIENT-LVL IV: CPT | Mod: PBBFAC,,, | Performed by: INTERNAL MEDICINE

## 2022-08-08 PROCEDURE — 99396 PR PREVENTIVE VISIT,EST,40-64: ICD-10-PCS | Mod: S$PBB,,, | Performed by: INTERNAL MEDICINE

## 2022-08-08 PROCEDURE — 3008F BODY MASS INDEX DOCD: CPT | Mod: CPTII,,, | Performed by: INTERNAL MEDICINE

## 2022-08-08 PROCEDURE — 99999 PR PBB SHADOW E&M-EST. PATIENT-LVL III: ICD-10-PCS | Mod: PBBFAC,,, | Performed by: OTOLARYNGOLOGY

## 2022-08-08 PROCEDURE — 99396 PREV VISIT EST AGE 40-64: CPT | Mod: S$PBB,,, | Performed by: INTERNAL MEDICINE

## 2022-08-08 PROCEDURE — 3078F DIAST BP <80 MM HG: CPT | Mod: CPTII,,, | Performed by: INTERNAL MEDICINE

## 2022-08-08 PROCEDURE — 3077F SYST BP >= 140 MM HG: CPT | Mod: CPTII,S$GLB,, | Performed by: OTOLARYNGOLOGY

## 2022-08-08 PROCEDURE — 99999 PR PBB SHADOW E&M-EST. PATIENT-LVL IV: ICD-10-PCS | Mod: PBBFAC,,, | Performed by: INTERNAL MEDICINE

## 2022-08-08 PROCEDURE — 69200 FOREIGN BODY REMOVAL: ICD-10-PCS | Mod: LT,S$GLB,, | Performed by: OTOLARYNGOLOGY

## 2022-08-08 PROCEDURE — 3080F PR MOST RECENT DIASTOLIC BLOOD PRESSURE >= 90 MM HG: ICD-10-PCS | Mod: CPTII,S$GLB,, | Performed by: OTOLARYNGOLOGY

## 2022-08-08 RX ORDER — NALOXONE HYDROCHLORIDE 4 MG/.1ML
SPRAY NASAL
Qty: 2 EACH | Refills: 11 | Status: SHIPPED | OUTPATIENT
Start: 2022-08-08

## 2022-08-08 RX ORDER — PHENTERMINE HYDROCHLORIDE 37.5 MG/1
37.5 TABLET ORAL
Qty: 30 TABLET | Refills: 2 | Status: SHIPPED | OUTPATIENT
Start: 2022-08-08 | End: 2022-09-07

## 2022-08-08 RX ORDER — OXYCODONE AND ACETAMINOPHEN 5; 325 MG/1; MG/1
1 TABLET ORAL EVERY 8 HOURS PRN
Qty: 21 TABLET | Refills: 0 | Status: SHIPPED | OUTPATIENT
Start: 2022-08-08 | End: 2022-08-08

## 2022-08-08 RX ORDER — IBUPROFEN 800 MG/1
800 TABLET ORAL 3 TIMES DAILY
Qty: 90 TABLET | Refills: 5 | Status: SHIPPED | OUTPATIENT
Start: 2022-08-08 | End: 2023-05-03 | Stop reason: SDUPTHER

## 2022-08-08 RX ORDER — OXYCODONE AND ACETAMINOPHEN 10; 325 MG/1; MG/1
1 TABLET ORAL EVERY 4 HOURS PRN
Qty: 21 TABLET | Refills: 0 | Status: SHIPPED | OUTPATIENT
Start: 2022-08-08 | End: 2022-09-28 | Stop reason: SDUPTHER

## 2022-08-08 RX ORDER — FLUTICASONE PROPIONATE 50 MCG
2 SPRAY, SUSPENSION (ML) NASAL DAILY
Qty: 16 G | Refills: 11 | Status: SHIPPED | OUTPATIENT
Start: 2022-08-08 | End: 2024-01-19

## 2022-08-08 RX ORDER — TRIAMTERENE/HYDROCHLOROTHIAZID 37.5-25 MG
1 TABLET ORAL DAILY PRN
Qty: 30 TABLET | Refills: 11 | Status: SHIPPED | OUTPATIENT
Start: 2022-08-08 | End: 2023-09-03

## 2022-08-08 NOTE — PROCEDURES
Foreign Body Removal    Date/Time: 8/8/2022 10:40 AM  Performed by: Elva Milan MD  Authorized by: Elva Milan MD   Body area: ear  Anesthesia: see MAR for details (none)    Patient sedated: no  Patient restrained: no  Patient cooperative: yes  Localization method: magnification  Removal mechanism: curette and alligator forceps  Complexity: simple  1 objects recovered.  Objects recovered: Dead goodman  Post-procedure assessment: foreign body removed  Patient tolerance: Patient tolerated the procedure well with no immediate complications  Comments: Foreign body removed.  Ear canal without signs of trauma or erythema.  TM intact and normal.      Elva Milan MD  [facility]

## 2022-08-08 NOTE — TELEPHONE ENCOUNTER
Spoke with Hollis to let him know that we are waiting for the doctor to respond back to previous message about the 10mg for the Percocets...

## 2022-08-08 NOTE — PROGRESS NOTES
[facility] Note    Chief Complaint      Chief Complaint   Patient presents with    Establish Care    Edema     Both feet    Sinus Problem     Post nasal drip    Back Pain       History of Present Illness      Indira Yousif is a 51 y.o. female with chronic conditions of allergic rhinosinusitis, chronic low back pain, chronic venous insufficiency who presents today for: establish care and annual preventative visit.  Allergic rhinosinusitis: Has been having cough, wheezing, post nasal drip.    Chronic low back pain: Takes ibuprofen 800 mg.    Chronic venous insufficiency: Worse after being on feet all day.    Flu shot UTD.  Td thinks 2 yrs ago.  COVID vaccine UTD.  Shingrix due age 52.  Pneumonia vaccine due age 67.  Mammogram due.  PAP smear N/A.  DEXA due age 67.  FITKit due.      Past Medical History:  Past Medical History:   Diagnosis Date    Anxiety        Past Surgical History:   has a past surgical history that includes Tubal ligation;  section; Hernia repair; Cyst Removal (Left, 1980); and Hysterectomy.    Family History:  family history includes Diabetes in her mother; Hypertension in her father and mother; Kidney disease in her mother; No Known Problems in her brother, brother, brother, brother, sister, son, and son.     Social History:  Social History     Tobacco Use    Smoking status: Never Smoker    Smokeless tobacco: Never Used   Substance Use Topics    Alcohol use: Yes     Comment: occasionally    Drug use: No       I personally reviewed all past medical, surgical, social and family history.    Review of Systems   Constitutional: Negative for chills, fever and malaise/fatigue.   Respiratory: Negative for shortness of breath.    Cardiovascular: Negative for chest pain.   Gastrointestinal: Negative for constipation, diarrhea, nausea and vomiting.   Skin: Negative for rash.   Neurological: Negative for weakness.   All other systems reviewed and are negative.        Medications:  Outpatient Encounter Medications as of 8/8/2022   Medication Sig Dispense Refill    fluticasone propionate (FLONASE) 50 mcg/actuation nasal spray 2 sprays (100 mcg total) by Each Nostril route once daily. 16 g 11    ibuprofen (ADVIL,MOTRIN) 800 MG tablet Take 1 tablet (800 mg total) by mouth 3 (three) times daily. 90 tablet 5    phentermine (ADIPEX-P) 37.5 mg tablet Take 1 tablet (37.5 mg total) by mouth before breakfast. 30 tablet 2    tizanidine 4 mg Cap Take by mouth.      triamterene-hydrochlorothiazide 37.5-25 mg (MAXZIDE-25) 37.5-25 mg per tablet Take 1 tablet by mouth daily as needed (leg swelling). 30 tablet 11    [DISCONTINUED] amoxicillin-clavulanate 875-125mg (AUGMENTIN) 875-125 mg per tablet Take 1 tablet by mouth 2 (two) times daily. (Patient not taking: Reported on 8/8/2022) 14 tablet 0    [DISCONTINUED] oxyCODONE-acetaminophen (PERCOCET) 5-325 mg per tablet Take 1 tablet by mouth every 8 (eight) hours as needed for Pain. 21 tablet 0     No facility-administered encounter medications on file as of 8/8/2022.       Allergies:  Review of patient's allergies indicates:  No Known Allergies    Health Maintenance:  Immunization History   Administered Date(s) Administered    COVID-19, MRNA, LN-S, PF (MODERNA FULL 0.5 ML DOSE) 03/16/2021, 04/13/2021    DTP 11/28/1973, 01/31/1974, 03/23/1977, 08/31/1977    Influenza 10/17/2018    Influenza - Quadrivalent 10/19/2016    Influenza - Quadrivalent - PF *Preferred* (6 months and older) 10/02/2017, 10/17/2018    MMR 01/31/1974    OPV 11/28/1973, 01/31/1974, 03/23/1977, 08/31/1977      Health Maintenance   Topic Date Due    Hepatitis C Screening  Never done    TETANUS VACCINE  Never done    Mammogram  12/13/2017    Lipid Panel  12/08/2026        Physical Exam      Vital Signs  Temp: 99 °F (37.2 °C)  Temp src: Oral  Pulse: 80  SpO2: 99 %  BP: 136/74  BP Location: Left arm  Patient Position: Sitting  Pain Score:   6  Pain Loc:  "Back  Height and Weight  Height: 5' 3.5" (161.3 cm)  Weight: 120.1 kg (264 lb 12.4 oz)  BSA (Calculated - sq m): 2.32 sq meters  BMI (Calculated): 46.2  Weight in (lb) to have BMI = 25: 143.1]    Physical Exam  Vitals reviewed.   Constitutional:       Appearance: She is well-developed.   HENT:      Head: Normocephalic and atraumatic.      Right Ear: External ear normal.      Left Ear: External ear normal.      Ears:      Comments: Foreign body present in left external ear canal, appears to be an insect  Cardiovascular:      Rate and Rhythm: Normal rate and regular rhythm.      Heart sounds: Normal heart sounds. No murmur heard.  Pulmonary:      Effort: Pulmonary effort is normal.      Breath sounds: Normal breath sounds. No wheezing or rales.   Abdominal:      General: Bowel sounds are normal. There is no distension.      Palpations: Abdomen is soft.      Tenderness: There is no abdominal tenderness.          Laboratory:  CBC:      CMP:        Invalid input(s): CREATININ  URINALYSIS:       LIPIDS:      TSH:      A1C:        Assessment/Plan     Indira Yousif is a 51 y.o.female with:    1. Annual physical exam  - CBC Auto Differential; Future  - Comprehensive Metabolic Panel; Future  - Lipid Panel; Future  - TSH; Future  - T4, Free; Future  - Hemoglobin A1C; Future  Discussed diet and exercise, vaccines and cancer screening, risk factors.  Screening labs ordered.     2. Impaired fasting glucose  - Hemoglobin A1C; Future  Update labs  3. Morbid obesity  - phentermine (ADIPEX-P) 37.5 mg tablet; Take 1 tablet (37.5 mg total) by mouth before breakfast.  Dispense: 30 tablet; Refill: 2  - Ambulatory referral/consult to Bariatric Surgery; Future  Start back on adipex and refer to bariatric surgery.  4. Chronic cough  - Pulmonary function test; Future  Check on PFTs  5. Chronic venous insufficiency  - triamterene-hydrochlorothiazide 37.5-25 mg (MAXZIDE-25) 37.5-25 mg per tablet; Take 1 tablet by mouth daily as needed (leg " swelling).  Dispense: 30 tablet; Refill: 11  Continue current meds.    6. Allergic rhinitis, unspecified seasonality, unspecified trigger  - fluticasone propionate (FLONASE) 50 mcg/actuation nasal spray; 2 sprays (100 mcg total) by Each Nostril route once daily.  Dispense: 16 g; Refill: 11  Start on flonase.  7. Screening mammogram, encounter for  - Mammo Digital Screening Bilat w/ Vasile; Future    8. Screen for colon cancer  - Fecal Immunochemical Test (iFOBT); Future    9. Chronic bilateral low back pain without sciatica  - oxyCODONE-acetaminophen (PERCOCET) 5-325 mg per tablet; Take 1 tablet by mouth every 8 (eight) hours as needed for Pain.  Dispense: 21 tablet; Refill: 0  - ibuprofen (ADVIL,MOTRIN) 800 MG tablet; Take 1 tablet (800 mg total) by mouth 3 (three) times daily.  Dispense: 90 tablet; Refill: 5  Continue current meds.  Counseled on not over-using oxycodone.      Chronic conditions status updated as per HPI.  Other than changes above, cont current medications and maintain follow up with specialists.  No follow-ups on file.    Future Appointments   Date Time Provider Department Center   11/8/2022  3:00 PM Kendrick Stanley MD [facility]       Kendrick Stanley MD  [facility]

## 2022-08-08 NOTE — PROGRESS NOTES
Chief Complaint   Patient presents with    Ear Fullness     Left ear feels like something is moving in it       HPI:  Indira Yousif is a 51 y.o. female who was noted to have a foreign body, an insect, in left ear 1 day prior to referral. The foreign body is associated with : no fever, no pressure and fullness, no drainage, no pain, no other complaints.  There is no associated history of no other complaints.  Patient was seen by her PCP today who noted a foreign object in the ear and was able to denote legs in wings that appeared to be an insect.    Discussed case with Dr. Stanley, patient's PCP, prior to referral.    Past Medical History:   Diagnosis Date    Anxiety      Social History     Socioeconomic History    Marital status:    Tobacco Use    Smoking status: Never Smoker    Smokeless tobacco: Never Used   Substance and Sexual Activity    Alcohol use: Yes     Comment: occasionally    Drug use: No    Sexual activity: Yes     Partners: Male     Birth control/protection: Surgical     Comment: My  Only   Social History Narrative    Lives with  and 2 children (21,13). Grandbaby visits frequently. 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 Watch movies. Family life is a big component of her life. Raises rabbits, and garden.      Past Surgical History:   Procedure Laterality Date     SECTION      x2    CYST REMOVAL Left 1980    HERNIA REPAIR      HYSTERECTOMY      partial    TUBAL LIGATION       Family History   Problem Relation Age of Onset    Hypertension Mother     Diabetes Mother     Kidney disease Mother     Hypertension Father     No Known Problems Sister     No Known Problems Brother     No Known Problems Son     No Known Problems Brother     No Known Problems Brother     No Known Problems Brother     No Known Problems Son     Cancer Neg Hx            Review of Systems  General: negative for chills, fever or weight loss  Psychological: negative for mood changes or  depression  Ophthalmic: negative for blurry vision, photophobia or eye pain  ENT: see HPI  Respiratory: no cough, shortness of breath, or wheezing  Cardiovascular: no chest pain or dyspnea on exertion  Gastrointestinal: no abdominal pain, change in bowel habits, or black/ bloody stools  Musculoskeletal: negative for gait disturbance or muscular weakness  Neurological: no syncope or seizures; no ataxia  Dermatological: negative for pruritis,  rash and jaundice  Hematologic/lymphatic: no easy bruising, no new adenopathy      Physical Exam:    Vitals:    08/08/22 1136   BP: (!) 160/93   Pulse: 70       Constitutional: Well appearing / communicating without difficutly.  NAD. Obese.  Eyes: EOM I Bilaterally  Head/Face: Normocephalic.  Negative paranasal sinus pressure/tenderness.  Salivary glands WNL.  House Brackmann I Bilaterally.    Right Ear: Auricle normal appearance. External Auditory Canal within normal limits no lesions or masses,TM w/o masses/lesions/perforations. TM mobility noted.   Left Ear: Auricle normal appearance. External Auditory Canal with foreign body noted, appears to be dead insect; TM w/o masses/lesions/perforations. TM mobility noted.  Nose: No gross nasal septal deviation. Inferior Turbinates 2+ bilaterally. No septal perforation. No masses/lesions. External nasal skin appears normal without masses/lesions.  Oral Cavity: Gingiva/lips within normal limits.  Dentition/gingiva healthy appearing. Mucus membranes moist. Floor of mouth soft, no masses palpated. Oral Tongue mobile. Hard Palate appears normal.    Oropharynx: Base of tongue appears normal. No masses/lesions noted. Tonsillar fossa/pharyngeal wall without lesions. Posterior oropharynx WNL.  Soft palate without masses. Midline uvula.   Neck/Lymphatic: No LAD I-VI bilaterally.  No thyromegaly.  No masses noted on exam.    Mirror laryngoscopy/nasopharyngoscopy: Active gag reflex.  Unable to perform.    Neuro/Psychiatric: AOx3.  Normal mood and  affect.   Cardiovascular: Normal carotid pulses bilaterally, no increasing jugular venous distention noted at cervical region bilaterally.    Respiratory: Normal respiratory effort, no stridor, no retractions noted.        See separate procedure note for foreign body removal left ear.    Assessment:    ICD-10-CM ICD-9-CM    1. Foreign body of left ear, initial encounter  T16.2XXA 931      E915    2. Otalgia of left ear  H92.02 388.70      The primary encounter diagnosis was Foreign body of left ear, initial encounter. A diagnosis of Otalgia of left ear was also pertinent to this visit.      Plan:      Plan:  Removal of foreign body from  left ear.    Follow-up p.r.n..      Elva Milan MD  [facility]

## 2022-08-08 NOTE — TELEPHONE ENCOUNTER
Dr. Cardona seeing her for 11, called and told patient about appointment. She asked about her Oxycodone states she can not take 5mg they do nothing for her, wants 10mg

## 2022-08-08 NOTE — TELEPHONE ENCOUNTER
----- Message from Henrique Desir sent at 8/8/2022 10:28 AM CDT -----  Contact: [facility] [phone]  Hollis is calling about the oxyCODONE-acetaminophen (PERCOCET) 5-325 mg per tablet. He said pt is saying it should be oxyCODONE-acetaminophen (PERCOCET)10 mg per tablet instead of 5-325 mg

## 2022-08-16 ENCOUNTER — TELEPHONE (OUTPATIENT)
Dept: BARIATRICS | Facility: CLINIC | Age: 52
End: 2022-08-16
Payer: COMMERCIAL

## 2022-08-16 NOTE — TELEPHONE ENCOUNTER
.Notified patient of the date & time of financial phone call appt.  Pt aware appt is a telephone call.  Dashboard updated

## 2022-08-22 ENCOUNTER — PATIENT MESSAGE (OUTPATIENT)
Dept: INTERNAL MEDICINE | Facility: CLINIC | Age: 52
End: 2022-08-22
Payer: COMMERCIAL

## 2022-09-28 ENCOUNTER — PATIENT MESSAGE (OUTPATIENT)
Dept: INTERNAL MEDICINE | Facility: CLINIC | Age: 52
End: 2022-09-28
Payer: COMMERCIAL

## 2022-09-28 DIAGNOSIS — M54.50 CHRONIC BILATERAL LOW BACK PAIN WITHOUT SCIATICA: ICD-10-CM

## 2022-09-28 DIAGNOSIS — G89.29 CHRONIC BILATERAL LOW BACK PAIN WITHOUT SCIATICA: ICD-10-CM

## 2022-09-28 RX ORDER — OXYCODONE AND ACETAMINOPHEN 10; 325 MG/1; MG/1
1 TABLET ORAL EVERY 4 HOURS PRN
Qty: 21 TABLET | Refills: 0 | Status: SHIPPED | OUTPATIENT
Start: 2022-09-28 | End: 2022-10-28 | Stop reason: SDUPTHER

## 2022-09-28 NOTE — TELEPHONE ENCOUNTER
No new care gaps identified.  [facility] Embedded Care Gaps. Reference number: [id number]. 9/28/2022   11:00:46 AM CDT

## 2022-10-06 ENCOUNTER — PATIENT MESSAGE (OUTPATIENT)
Dept: BARIATRICS | Facility: CLINIC | Age: 52
End: 2022-10-06
Payer: COMMERCIAL

## 2022-10-18 ENCOUNTER — CLINICAL SUPPORT (OUTPATIENT)
Dept: OTHER | Facility: CLINIC | Age: 52
End: 2022-10-18

## 2022-10-18 DIAGNOSIS — Z00.8 ENCOUNTER FOR OTHER GENERAL EXAMINATION: ICD-10-CM

## 2022-10-24 LAB
HDLC SERPL-MCNC: 74 MG/DL
POC CHOLESTEROL, TOTAL: 190 MG/DL
POC GLUCOSE, FASTING: 82 MG/DL (ref 60–110)
TRIGL SERPL-MCNC: 44 MG/DL

## 2022-10-27 ENCOUNTER — PATIENT MESSAGE (OUTPATIENT)
Dept: INTERNAL MEDICINE | Facility: CLINIC | Age: 52
End: 2022-10-27
Payer: COMMERCIAL

## 2022-10-27 DIAGNOSIS — G89.29 CHRONIC BILATERAL LOW BACK PAIN WITHOUT SCIATICA: ICD-10-CM

## 2022-10-27 DIAGNOSIS — M54.50 CHRONIC BILATERAL LOW BACK PAIN WITHOUT SCIATICA: ICD-10-CM

## 2022-10-28 ENCOUNTER — TELEPHONE (OUTPATIENT)
Dept: INTERNAL MEDICINE | Facility: CLINIC | Age: 52
End: 2022-10-28
Payer: COMMERCIAL

## 2022-10-28 RX ORDER — OXYCODONE AND ACETAMINOPHEN 10; 325 MG/1; MG/1
1 TABLET ORAL EVERY 4 HOURS PRN
Qty: 21 TABLET | Refills: 0 | Status: SHIPPED | OUTPATIENT
Start: 2022-10-28 | End: 2022-11-22 | Stop reason: SDUPTHER

## 2022-10-28 NOTE — TELEPHONE ENCOUNTER
----- Message from Carlos Wynne sent at 10/28/2022  2:50 PM CDT -----  The patient wants to know if you can prescribe an alternative medicine because, the fluticasone propionate (FLONASE) 50 mcg/actuation nasal spray doesn't work for her post nasal drip.    Thank you

## 2022-10-28 NOTE — TELEPHONE ENCOUNTER
----- Message from Carlos Wynne sent at 10/28/2022  2:49 PM CDT -----  Requesting an RX refill or new RX.  Is this a refill or new RX: Refill  RX name and strength oxyCODONE-acetaminophen (PERCOCET)  mg per tablet  Is this a 30 day or 90 day RX:   Pharmacy name and phone # [facility] Phone:  [phone]  Fax:  [phone]

## 2022-10-29 VITALS
SYSTOLIC BLOOD PRESSURE: 130 MMHG | WEIGHT: 257 LBS | BODY MASS INDEX: 43.87 KG/M2 | DIASTOLIC BLOOD PRESSURE: 83 MMHG | HEIGHT: 64 IN

## 2022-11-22 DIAGNOSIS — M54.50 CHRONIC BILATERAL LOW BACK PAIN WITHOUT SCIATICA: ICD-10-CM

## 2022-11-22 DIAGNOSIS — G89.29 CHRONIC BILATERAL LOW BACK PAIN WITHOUT SCIATICA: ICD-10-CM

## 2022-11-22 RX ORDER — OXYCODONE AND ACETAMINOPHEN 10; 325 MG/1; MG/1
1 TABLET ORAL EVERY 4 HOURS PRN
Qty: 21 TABLET | Refills: 0 | Status: SHIPPED | OUTPATIENT
Start: 2022-11-22 | End: 2023-01-04 | Stop reason: SDUPTHER

## 2022-11-22 NOTE — TELEPHONE ENCOUNTER
----- Message from Henok Vallecillo sent at 11/22/2022 10:03 AM CST -----  Contact: Pt [phone]  Requesting an RX refill or new RX.  Is this a refill or new RX: refill   RX name and strength (copy/paste from chart):  oxyCODONE-acetaminophen (PERCOCET)  mg per tablet  Is this a 30 day or 90 day RX:   Pharmacy name and phone # (copy/paste from chart):  [facility]   Phone:  [phone]  Fax:  [phone]    The doctors have asked that we provide their patients with the following 2 reminders -- prescription refills can take up to 72 hours, and a friendly reminder that in the future you can use your [facility] account to request refills: yes

## 2022-11-22 NOTE — TELEPHONE ENCOUNTER
No new care gaps identified.  [facility] Embedded Care Gaps. Reference number: [id number]. 11/22/2022   10:08:40 AM CST

## 2022-12-18 ENCOUNTER — PATIENT MESSAGE (OUTPATIENT)
Dept: INTERNAL MEDICINE | Facility: CLINIC | Age: 52
End: 2022-12-18
Payer: COMMERCIAL

## 2022-12-18 DIAGNOSIS — M54.50 CHRONIC BILATERAL LOW BACK PAIN WITHOUT SCIATICA: ICD-10-CM

## 2022-12-18 DIAGNOSIS — G89.29 CHRONIC BILATERAL LOW BACK PAIN WITHOUT SCIATICA: ICD-10-CM

## 2022-12-18 RX ORDER — OXYCODONE AND ACETAMINOPHEN 10; 325 MG/1; MG/1
1 TABLET ORAL EVERY 4 HOURS PRN
Qty: 21 TABLET | Refills: 0 | Status: CANCELLED | OUTPATIENT
Start: 2022-12-18

## 2022-12-19 RX ORDER — OXYCODONE AND ACETAMINOPHEN 10; 325 MG/1; MG/1
1 TABLET ORAL EVERY 4 HOURS PRN
Qty: 21 TABLET | Refills: 0 | OUTPATIENT
Start: 2022-12-19

## 2022-12-19 NOTE — TELEPHONE ENCOUNTER
No new care gaps identified.  [facility] Embedded Care Gaps. Reference number: [id number]. 12/18/2022   7:26:00 PM CST

## 2022-12-19 NOTE — TELEPHONE ENCOUNTER
No new care gaps identified.  [facility] Embedded Care Gaps. Reference number: [id number]. 12/18/2022   7:18:13 PM CST

## 2022-12-19 NOTE — TELEPHONE ENCOUNTER
Will need to wait until seen per MARIANO requirements.  Looks like she had an appt in 11/2022 that she missed.

## 2022-12-20 ENCOUNTER — TELEPHONE (OUTPATIENT)
Dept: INTERNAL MEDICINE | Facility: CLINIC | Age: 52
End: 2022-12-20
Payer: COMMERCIAL

## 2022-12-20 DIAGNOSIS — G89.29 CHRONIC BILATERAL LOW BACK PAIN WITHOUT SCIATICA: ICD-10-CM

## 2022-12-20 DIAGNOSIS — M54.50 CHRONIC BILATERAL LOW BACK PAIN WITHOUT SCIATICA: ICD-10-CM

## 2022-12-20 RX ORDER — OXYCODONE AND ACETAMINOPHEN 10; 325 MG/1; MG/1
1 TABLET ORAL EVERY 4 HOURS PRN
Qty: 21 TABLET | Refills: 0 | Status: CANCELLED | OUTPATIENT
Start: 2022-12-20

## 2022-12-20 NOTE — TELEPHONE ENCOUNTER
----- Message from Venessa Gordon sent at 12/20/2022 12:22 PM CST -----  Contact: Self/[phone]  Pt said that she is calling in regards to needing to check the status of a refill for her oxyCODONE-acetaminophen (PERCOCET)  mg per tablet. Please advise

## 2022-12-20 NOTE — TELEPHONE ENCOUNTER
----- Message from Venessa Gordon sent at 12/20/2022 12:22 PM CST -----  Contact: Self/[phone]  Pt said that she is calling in regards to needing to check the status of a refill for her oxyCODONE-acetaminophen (PERCOCET)  mg per tablet. Please advise

## 2023-01-04 ENCOUNTER — OFFICE VISIT (OUTPATIENT)
Dept: INTERNAL MEDICINE | Facility: CLINIC | Age: 53
End: 2023-01-04
Payer: COMMERCIAL

## 2023-01-04 VITALS
HEART RATE: 78 BPM | DIASTOLIC BLOOD PRESSURE: 80 MMHG | SYSTOLIC BLOOD PRESSURE: 130 MMHG | OXYGEN SATURATION: 98 % | HEIGHT: 64 IN | WEIGHT: 264.25 LBS | BODY MASS INDEX: 45.11 KG/M2

## 2023-01-04 DIAGNOSIS — E66.01 MORBID OBESITY: ICD-10-CM

## 2023-01-04 DIAGNOSIS — Z79.891 ENCOUNTER FOR MONITORING OPIOID MAINTENANCE THERAPY: ICD-10-CM

## 2023-01-04 DIAGNOSIS — M54.50 CHRONIC BILATERAL LOW BACK PAIN WITHOUT SCIATICA: Primary | ICD-10-CM

## 2023-01-04 DIAGNOSIS — G89.29 CHRONIC BILATERAL LOW BACK PAIN WITHOUT SCIATICA: Primary | ICD-10-CM

## 2023-01-04 DIAGNOSIS — Z51.81 ENCOUNTER FOR MONITORING OPIOID MAINTENANCE THERAPY: ICD-10-CM

## 2023-01-04 PROCEDURE — 3079F DIAST BP 80-89 MM HG: CPT | Mod: CPTII,S$GLB,, | Performed by: INTERNAL MEDICINE

## 2023-01-04 PROCEDURE — 3075F PR MOST RECENT SYSTOLIC BLOOD PRESS GE 130-139MM HG: ICD-10-PCS | Mod: CPTII,S$GLB,, | Performed by: INTERNAL MEDICINE

## 2023-01-04 PROCEDURE — 3008F BODY MASS INDEX DOCD: CPT | Mod: CPTII,S$GLB,, | Performed by: INTERNAL MEDICINE

## 2023-01-04 PROCEDURE — 1159F MED LIST DOCD IN RCRD: CPT | Mod: CPTII,S$GLB,, | Performed by: INTERNAL MEDICINE

## 2023-01-04 PROCEDURE — 99999 PR PBB SHADOW E&M-EST. PATIENT-LVL IV: ICD-10-PCS | Mod: PBBFAC,,, | Performed by: INTERNAL MEDICINE

## 2023-01-04 PROCEDURE — 99999 PR PBB SHADOW E&M-EST. PATIENT-LVL IV: CPT | Mod: PBBFAC,,, | Performed by: INTERNAL MEDICINE

## 2023-01-04 PROCEDURE — 3008F PR BODY MASS INDEX (BMI) DOCUMENTED: ICD-10-PCS | Mod: CPTII,S$GLB,, | Performed by: INTERNAL MEDICINE

## 2023-01-04 PROCEDURE — 99214 PR OFFICE/OUTPT VISIT, EST, LEVL IV, 30-39 MIN: ICD-10-PCS | Mod: S$GLB,,, | Performed by: INTERNAL MEDICINE

## 2023-01-04 PROCEDURE — 1160F RVW MEDS BY RX/DR IN RCRD: CPT | Mod: CPTII,S$GLB,, | Performed by: INTERNAL MEDICINE

## 2023-01-04 PROCEDURE — 3075F SYST BP GE 130 - 139MM HG: CPT | Mod: CPTII,S$GLB,, | Performed by: INTERNAL MEDICINE

## 2023-01-04 PROCEDURE — 3079F PR MOST RECENT DIASTOLIC BLOOD PRESSURE 80-89 MM HG: ICD-10-PCS | Mod: CPTII,S$GLB,, | Performed by: INTERNAL MEDICINE

## 2023-01-04 PROCEDURE — 80307 DRUG TEST PRSMV CHEM ANLYZR: CPT | Performed by: INTERNAL MEDICINE

## 2023-01-04 PROCEDURE — 1159F PR MEDICATION LIST DOCUMENTED IN MEDICAL RECORD: ICD-10-PCS | Mod: CPTII,S$GLB,, | Performed by: INTERNAL MEDICINE

## 2023-01-04 PROCEDURE — 1160F PR REVIEW ALL MEDS BY PRESCRIBER/CLIN PHARMACIST DOCUMENTED: ICD-10-PCS | Mod: CPTII,S$GLB,, | Performed by: INTERNAL MEDICINE

## 2023-01-04 PROCEDURE — 99214 OFFICE O/P EST MOD 30 MIN: CPT | Mod: S$GLB,,, | Performed by: INTERNAL MEDICINE

## 2023-01-04 RX ORDER — OXYCODONE AND ACETAMINOPHEN 10; 325 MG/1; MG/1
1 TABLET ORAL EVERY 4 HOURS PRN
Qty: 28 TABLET | Refills: 0 | Status: SHIPPED | OUTPATIENT
Start: 2023-01-04 | End: 2023-02-02 | Stop reason: SDUPTHER

## 2023-01-04 RX ORDER — SEMAGLUTIDE 0.25 MG/.5ML
0.25 INJECTION, SOLUTION SUBCUTANEOUS
Qty: 2 ML | Refills: 3 | Status: SHIPPED | OUTPATIENT
Start: 2023-01-04 | End: 2023-01-04

## 2023-01-04 NOTE — PROGRESS NOTES
[facility] Note    Chief Complaint      Chief Complaint   Patient presents with    Follow-up     3 month f/u       History of Present Illness      Indira Yousif is a 52 y.o. female with chronic conditions of allergic rhinosinusitis, chronic low back pain, chronic venous insufficiency who presents today for: follow up chronic back pain.  Taking oxycodone as needed which is doing well to control.  UDS due.    Flu shot UTD.  Td thinks 2 yrs ago.  COVID vaccine UTD.  Shingrix due age 52.  Pneumonia vaccine due age 67.  Mammogram due.  PAP smear N/A.  DEXA due age 67.  FITKit due.    Past Medical History:  Past Medical History:   Diagnosis Date    Anxiety        Past Surgical History:   has a past surgical history that includes Tubal ligation;  section; Hernia repair; Cyst Removal (Left, 1980); and Hysterectomy.    Family History:  family history includes Diabetes in her mother; Hypertension in her father and mother; Kidney disease in her mother; No Known Problems in her brother, brother, brother, brother, sister, son, and son.     Social History:  Social History     Tobacco Use    Smoking status: Never    Smokeless tobacco: Never   Substance Use Topics    Alcohol use: Yes     Comment: occasionally    Drug use: No       I personally reviewed all past medical, surgical, social and family history.    Review of Systems   Constitutional:  Negative for chills, fever and malaise/fatigue.   Respiratory:  Negative for shortness of breath.    Cardiovascular:  Negative for chest pain.   Gastrointestinal:  Negative for constipation, diarrhea, nausea and vomiting.   Skin:  Negative for rash.   Neurological:  Negative for weakness.   All other systems reviewed and are negative.     Medications:  Outpatient Encounter Medications as of 2023   Medication Sig Dispense Refill    fluticasone propionate (FLONASE) 50 mcg/actuation nasal spray 2 sprays (100 mcg total) by Each Nostril route once daily. 16 g  "11    ibuprofen (ADVIL,MOTRIN) 800 MG tablet Take 1 tablet (800 mg total) by mouth 3 (three) times daily. 90 tablet 5    naloxone (NARCAN) 4 mg/actuation Spry 4mg by nasal route as needed for opioid overdose; may repeat every 2-3 minutes in alternating nostrils until medical help arrives. Call 911 2 each 11    oxyCODONE-acetaminophen (PERCOCET)  mg per tablet Take 1 tablet by mouth every 4 (four) hours as needed for Pain. 28 tablet 0    tizanidine 4 mg Cap Take by mouth.      triamterene-hydrochlorothiazide 37.5-25 mg (MAXZIDE-25) 37.5-25 mg per tablet Take 1 tablet by mouth daily as needed (leg swelling). 30 tablet 11    [DISCONTINUED] oxyCODONE-acetaminophen (PERCOCET)  mg per tablet Take 1 tablet by mouth every 4 (four) hours as needed for Pain. 21 tablet 0    [DISCONTINUED] semaglutide, weight loss, (WEGOVY) 0.25 mg/0.5 mL PnIj Inject 0.25 mg into the skin every 7 days. 2 mL 3     No facility-administered encounter medications on file as of 1/4/2023.       Allergies:  Review of patient's allergies indicates:  No Known Allergies    Health Maintenance:  Immunization History   Administered Date(s) Administered    COVID-19, MRNA, LN-S, PF (MODERNA FULL 0.5 ML DOSE) 03/16/2021, 04/13/2021    DTP 11/28/1973, 01/31/1974, 03/23/1977, 08/31/1977    Influenza 10/17/2018    Influenza - Quadrivalent 10/19/2016    Influenza - Quadrivalent - PF *Preferred* (6 months and older) 10/02/2017, 10/17/2018    MMR 01/31/1974    OPV 11/28/1973, 01/31/1974, 03/23/1977, 08/31/1977      Health Maintenance   Topic Date Due    Hepatitis C Screening  Never done    TETANUS VACCINE  Never done    Mammogram  12/13/2017    Lipid Panel  10/18/2027        Physical Exam      Vital Signs  Pulse: 78  SpO2: 98 %  BP: 130/80  BP Location: Left arm  Patient Position: Sitting  Pain Score:   6  Height and Weight  Height: 5' 4" (162.6 cm)  Weight: 119.8 kg (264 lb 3.5 oz)  BSA (Calculated - sq m): 2.33 sq meters  BMI (Calculated): 45.3  Weight " in (lb) to have BMI = 25: 145.3]    Physical Exam  Vitals reviewed.   Constitutional:       Appearance: She is well-developed.   HENT:      Head: Normocephalic and atraumatic.      Right Ear: External ear normal.      Left Ear: External ear normal.   Cardiovascular:      Rate and Rhythm: Normal rate and regular rhythm.      Heart sounds: Normal heart sounds. No murmur heard.  Pulmonary:      Effort: Pulmonary effort is normal.      Breath sounds: Normal breath sounds. No wheezing or rales.   Abdominal:      General: Bowel sounds are normal. There is no distension.      Palpations: Abdomen is soft.      Tenderness: There is no abdominal tenderness.        Laboratory:  CBC:  Recent Labs   Lab 08/11/22 0618   WBC 6.32   RBC 4.78   Hemoglobin 12.3   Hematocrit 37.8   Platelets 255   MCV 79 L   MCH 25.7 L   MCHC 32.5     CMP:  Recent Labs   Lab 08/11/22 0618   Glucose 105   Calcium 8.7   Albumin 4.1   Total Protein 7.9   Sodium 138   Potassium 4.2   CO2 29   Chloride 99   BUN 9   Alkaline Phosphatase 81   ALT 12   AST 19   Total Bilirubin 1.3 H     URINALYSIS:       LIPIDS:  Recent Labs   Lab 08/11/22 0618   TSH 1.050   HDL 81 H   Cholesterol 207 H   Triglycerides 46   LDL Cholesterol 116.8   HDL/Cholesterol Ratio 39.1   Non-HDL Cholesterol 126   Total Cholesterol/HDL Ratio 2.6     TSH:  Recent Labs   Lab 08/11/22 0618   TSH 1.050     A1C:  Recent Labs   Lab 08/11/22 0618   Hemoglobin A1C 5.4       Assessment/Plan     Indira Yousif is a 52 y.o.female with:    1. Chronic bilateral low back pain without sciatica  - Toxicology screen, urine  - oxyCODONE-acetaminophen (PERCOCET)  mg per tablet; Take 1 tablet by mouth every 4 (four) hours as needed for Pain.  Dispense: 28 tablet; Refill: 0    2. Encounter for monitoring opioid maintenance therapy  - Toxicology screen, urine    3. Morbid obesity  Continue current meds.  UDS ordered.    Chronic conditions status updated as per HPI.  Other than changes above, cont  current medications and maintain follow up with specialists.  No follow-ups on file.    Future Appointments   Date Time Provider Department Center   4/12/2023  8:15 AM Kendrick Stanley MD [facility]       Kendrick Stanley MD  [facility] [place]

## 2023-01-05 ENCOUNTER — PATIENT MESSAGE (OUTPATIENT)
Dept: INTERNAL MEDICINE | Facility: CLINIC | Age: 53
End: 2023-01-05
Payer: COMMERCIAL

## 2023-01-05 LAB
AMPHET+METHAMPHET UR QL: NEGATIVE
BARBITURATES UR QL SCN>200 NG/ML: NEGATIVE
BENZODIAZ UR QL SCN>200 NG/ML: NEGATIVE
BZE UR QL SCN: NEGATIVE
CANNABINOIDS UR QL SCN: NEGATIVE
CREAT UR-MCNC: 68 MG/DL (ref 15–325)
ETHANOL UR-MCNC: <10 MG/DL
METHADONE UR QL SCN>300 NG/ML: NEGATIVE
OPIATES UR QL SCN: NEGATIVE
PCP UR QL SCN>25 NG/ML: NEGATIVE
TOXICOLOGY INFORMATION: NORMAL

## 2023-01-06 ENCOUNTER — PATIENT MESSAGE (OUTPATIENT)
Dept: INTERNAL MEDICINE | Facility: CLINIC | Age: 53
End: 2023-01-06
Payer: COMMERCIAL

## 2023-01-06 ENCOUNTER — TELEPHONE (OUTPATIENT)
Dept: INTERNAL MEDICINE | Facility: CLINIC | Age: 53
End: 2023-01-06
Payer: COMMERCIAL

## 2023-01-06 DIAGNOSIS — R73.01 IMPAIRED FASTING GLUCOSE: ICD-10-CM

## 2023-01-06 DIAGNOSIS — E66.01 MORBID OBESITY: Primary | ICD-10-CM

## 2023-01-06 RX ORDER — SEMAGLUTIDE 1.34 MG/ML
INJECTION, SOLUTION SUBCUTANEOUS
Qty: 3 PEN | Refills: 0 | Status: SHIPPED | OUTPATIENT
Start: 2023-01-06 | End: 2023-04-28 | Stop reason: SDUPTHER

## 2023-01-10 ENCOUNTER — PATIENT MESSAGE (OUTPATIENT)
Dept: INTERNAL MEDICINE | Facility: CLINIC | Age: 53
End: 2023-01-10
Payer: COMMERCIAL

## 2023-01-18 ENCOUNTER — PATIENT MESSAGE (OUTPATIENT)
Dept: PRIMARY CARE CLINIC | Facility: CLINIC | Age: 53
End: 2023-01-18
Payer: COMMERCIAL

## 2023-02-02 ENCOUNTER — PATIENT MESSAGE (OUTPATIENT)
Dept: PRIMARY CARE CLINIC | Facility: CLINIC | Age: 53
End: 2023-02-02
Payer: COMMERCIAL

## 2023-02-02 DIAGNOSIS — G89.29 CHRONIC BILATERAL LOW BACK PAIN WITHOUT SCIATICA: ICD-10-CM

## 2023-02-02 DIAGNOSIS — M54.50 CHRONIC BILATERAL LOW BACK PAIN WITHOUT SCIATICA: ICD-10-CM

## 2023-02-02 RX ORDER — OXYCODONE AND ACETAMINOPHEN 10; 325 MG/1; MG/1
1 TABLET ORAL EVERY 4 HOURS PRN
Qty: 28 TABLET | Refills: 0 | Status: SHIPPED | OUTPATIENT
Start: 2023-02-02 | End: 2023-03-01 | Stop reason: SDUPTHER

## 2023-02-02 NOTE — TELEPHONE ENCOUNTER
I pended the Oxycodone to [facility] but I didn't see the Adipex in her chart to pend to [facility] in [place].

## 2023-02-09 ENCOUNTER — PATIENT MESSAGE (OUTPATIENT)
Dept: ADMINISTRATIVE | Facility: HOSPITAL | Age: 53
End: 2023-02-09
Payer: COMMERCIAL

## 2023-02-09 DIAGNOSIS — Z12.11 SCREENING FOR COLON CANCER: ICD-10-CM

## 2023-03-01 ENCOUNTER — PATIENT MESSAGE (OUTPATIENT)
Dept: PRIMARY CARE CLINIC | Facility: CLINIC | Age: 53
End: 2023-03-01
Payer: COMMERCIAL

## 2023-03-01 DIAGNOSIS — G89.29 CHRONIC BILATERAL LOW BACK PAIN WITHOUT SCIATICA: ICD-10-CM

## 2023-03-01 DIAGNOSIS — M54.50 CHRONIC BILATERAL LOW BACK PAIN WITHOUT SCIATICA: ICD-10-CM

## 2023-03-01 RX ORDER — OXYCODONE AND ACETAMINOPHEN 10; 325 MG/1; MG/1
1 TABLET ORAL EVERY 4 HOURS PRN
Qty: 28 TABLET | Refills: 0 | Status: CANCELLED | OUTPATIENT
Start: 2023-03-01

## 2023-03-01 RX ORDER — OXYCODONE AND ACETAMINOPHEN 10; 325 MG/1; MG/1
1 TABLET ORAL EVERY 4 HOURS PRN
Qty: 28 TABLET | Refills: 0 | Status: SHIPPED | OUTPATIENT
Start: 2023-03-01 | End: 2023-03-26 | Stop reason: SDUPTHER

## 2023-03-15 LAB — HEMOCCULT STL QL IA: NEGATIVE

## 2023-03-24 ENCOUNTER — PATIENT MESSAGE (OUTPATIENT)
Dept: PRIMARY CARE CLINIC | Facility: CLINIC | Age: 53
End: 2023-03-24
Payer: COMMERCIAL

## 2023-03-24 DIAGNOSIS — E66.01 MORBID OBESITY: ICD-10-CM

## 2023-03-24 DIAGNOSIS — G89.29 CHRONIC BILATERAL LOW BACK PAIN WITHOUT SCIATICA: ICD-10-CM

## 2023-03-24 DIAGNOSIS — M54.50 CHRONIC BILATERAL LOW BACK PAIN WITHOUT SCIATICA: ICD-10-CM

## 2023-03-24 DIAGNOSIS — R73.01 IMPAIRED FASTING GLUCOSE: ICD-10-CM

## 2023-03-24 RX ORDER — SEMAGLUTIDE 1.34 MG/ML
INJECTION, SOLUTION SUBCUTANEOUS
Qty: 3 EACH | Refills: 0 | Status: CANCELLED | OUTPATIENT
Start: 2023-03-24 | End: 2024-04-20

## 2023-03-24 RX ORDER — PEN NEEDLE, DIABETIC 30 GX3/16"
NEEDLE, DISPOSABLE MISCELLANEOUS
Qty: 100 EACH | Refills: 1 | Status: SHIPPED | OUTPATIENT
Start: 2023-03-24

## 2023-03-26 RX ORDER — OXYCODONE AND ACETAMINOPHEN 10; 325 MG/1; MG/1
1 TABLET ORAL EVERY 4 HOURS PRN
Qty: 21 TABLET | Refills: 0 | Status: SHIPPED | OUTPATIENT
Start: 2023-03-26 | End: 2023-04-12 | Stop reason: SDUPTHER

## 2023-04-12 ENCOUNTER — OFFICE VISIT (OUTPATIENT)
Dept: PRIMARY CARE CLINIC | Facility: CLINIC | Age: 53
End: 2023-04-12
Payer: COMMERCIAL

## 2023-04-12 ENCOUNTER — LAB VISIT (OUTPATIENT)
Dept: LAB | Facility: HOSPITAL | Age: 53
End: 2023-04-12
Attending: INTERNAL MEDICINE
Payer: COMMERCIAL

## 2023-04-12 VITALS
DIASTOLIC BLOOD PRESSURE: 82 MMHG | OXYGEN SATURATION: 100 % | HEART RATE: 85 BPM | BODY MASS INDEX: 42.46 KG/M2 | HEIGHT: 64 IN | SYSTOLIC BLOOD PRESSURE: 120 MMHG | WEIGHT: 248.69 LBS

## 2023-04-12 DIAGNOSIS — M54.50 CHRONIC BILATERAL LOW BACK PAIN WITHOUT SCIATICA: ICD-10-CM

## 2023-04-12 DIAGNOSIS — Z12.31 SCREENING MAMMOGRAM, ENCOUNTER FOR: ICD-10-CM

## 2023-04-12 DIAGNOSIS — E66.01 MORBID OBESITY: ICD-10-CM

## 2023-04-12 DIAGNOSIS — G89.29 CHRONIC BILATERAL LOW BACK PAIN WITHOUT SCIATICA: Primary | ICD-10-CM

## 2023-04-12 DIAGNOSIS — Z51.81 ENCOUNTER FOR MONITORING OPIOID MAINTENANCE THERAPY: ICD-10-CM

## 2023-04-12 DIAGNOSIS — Z00.00 ANNUAL PHYSICAL EXAM: ICD-10-CM

## 2023-04-12 DIAGNOSIS — Z79.891 ENCOUNTER FOR MONITORING OPIOID MAINTENANCE THERAPY: ICD-10-CM

## 2023-04-12 DIAGNOSIS — G89.29 CHRONIC BILATERAL LOW BACK PAIN WITHOUT SCIATICA: ICD-10-CM

## 2023-04-12 DIAGNOSIS — M54.50 CHRONIC BILATERAL LOW BACK PAIN WITHOUT SCIATICA: Primary | ICD-10-CM

## 2023-04-12 PROCEDURE — 99214 PR OFFICE/OUTPT VISIT, EST, LEVL IV, 30-39 MIN: ICD-10-PCS | Mod: S$GLB,,, | Performed by: INTERNAL MEDICINE

## 2023-04-12 PROCEDURE — 1159F PR MEDICATION LIST DOCUMENTED IN MEDICAL RECORD: ICD-10-PCS | Mod: CPTII,S$GLB,, | Performed by: INTERNAL MEDICINE

## 2023-04-12 PROCEDURE — 3079F DIAST BP 80-89 MM HG: CPT | Mod: CPTII,S$GLB,, | Performed by: INTERNAL MEDICINE

## 2023-04-12 PROCEDURE — 3074F SYST BP LT 130 MM HG: CPT | Mod: CPTII,S$GLB,, | Performed by: INTERNAL MEDICINE

## 2023-04-12 PROCEDURE — 1159F MED LIST DOCD IN RCRD: CPT | Mod: CPTII,S$GLB,, | Performed by: INTERNAL MEDICINE

## 2023-04-12 PROCEDURE — 99214 OFFICE O/P EST MOD 30 MIN: CPT | Mod: S$GLB,,, | Performed by: INTERNAL MEDICINE

## 2023-04-12 PROCEDURE — 3008F BODY MASS INDEX DOCD: CPT | Mod: CPTII,S$GLB,, | Performed by: INTERNAL MEDICINE

## 2023-04-12 PROCEDURE — 3008F PR BODY MASS INDEX (BMI) DOCUMENTED: ICD-10-PCS | Mod: CPTII,S$GLB,, | Performed by: INTERNAL MEDICINE

## 2023-04-12 PROCEDURE — 3079F PR MOST RECENT DIASTOLIC BLOOD PRESSURE 80-89 MM HG: ICD-10-PCS | Mod: CPTII,S$GLB,, | Performed by: INTERNAL MEDICINE

## 2023-04-12 PROCEDURE — 99999 PR PBB SHADOW E&M-EST. PATIENT-LVL III: ICD-10-PCS | Mod: PBBFAC,,, | Performed by: INTERNAL MEDICINE

## 2023-04-12 PROCEDURE — 80307 DRUG TEST PRSMV CHEM ANLYZR: CPT | Performed by: INTERNAL MEDICINE

## 2023-04-12 PROCEDURE — 3074F PR MOST RECENT SYSTOLIC BLOOD PRESSURE < 130 MM HG: ICD-10-PCS | Mod: CPTII,S$GLB,, | Performed by: INTERNAL MEDICINE

## 2023-04-12 PROCEDURE — 99999 PR PBB SHADOW E&M-EST. PATIENT-LVL III: CPT | Mod: PBBFAC,,, | Performed by: INTERNAL MEDICINE

## 2023-04-12 RX ORDER — OXYCODONE AND ACETAMINOPHEN 10; 325 MG/1; MG/1
1 TABLET ORAL EVERY 4 HOURS PRN
Qty: 21 TABLET | Refills: 0 | Status: SHIPPED | OUTPATIENT
Start: 2023-04-12 | End: 2023-05-03 | Stop reason: SDUPTHER

## 2023-04-12 NOTE — PROGRESS NOTES
[facility] Note    Chief Complaint      Chief Complaint   Patient presents with    Follow-up     3 month f/u       History of Present Illness      Indira Yousif is a 52 y.o. female with chronic conditions of allergic rhinosinusitis, chronic low back pain, chronic venous insufficiency who presents today for:follow up chronic back pain.  Taking oxycodone as needed which is doing well to control.  UDS due.  Also on ozempic for weight loss.  Has lost 20 lbs since last visit.   Flu shot UTD.  Td thinks 2 yrs ago.  COVID vaccine UTD.  Shingrix due age 52.  Pneumonia vaccine due age 67.  Mammogram due.  PAP smear N/A.  DEXA due age 67.  FITKit .    Past Medical History:  Past Medical History:   Diagnosis Date    Anxiety        Past Surgical History:   has a past surgical history that includes Tubal ligation;  section; Hernia repair; Cyst Removal (Left, 1980); and Hysterectomy.    Family History:  family history includes Diabetes in her mother; Hypertension in her father and mother; Kidney disease in her mother; No Known Problems in her brother, brother, brother, brother, sister, son, and son.     Social History:  Social History     Tobacco Use    Smoking status: Never    Smokeless tobacco: Never   Substance Use Topics    Alcohol use: Yes     Comment: occasionally    Drug use: No       I personally reviewed all past medical, surgical, social and family history.    Review of Systems   Constitutional:  Negative for chills, fever and malaise/fatigue.   Respiratory:  Negative for shortness of breath.    Cardiovascular:  Negative for chest pain.   Gastrointestinal:  Negative for constipation, diarrhea, nausea and vomiting.   Skin:  Negative for rash.   Neurological:  Negative for weakness.   All other systems reviewed and are negative.     Medications:  Outpatient Encounter Medications as of 2023   Medication Sig Dispense Refill    fluticasone propionate (FLONASE) 50 mcg/actuation nasal  "spray 2 sprays (100 mcg total) by Each Nostril route once daily. 16 g 11    ibuprofen (ADVIL,MOTRIN) 800 MG tablet Take 1 tablet (800 mg total) by mouth 3 (three) times daily. 90 tablet 5    naloxone (NARCAN) 4 mg/actuation Spry 4mg by nasal route as needed for opioid overdose; may repeat every 2-3 minutes in alternating nostrils until medical help arrives. Call 911 2 each 11    oxyCODONE-acetaminophen (PERCOCET)  mg per tablet Take 1 tablet by mouth every 4 (four) hours as needed for Pain. 21 tablet 0    pen needle, diabetic (PEN NEEDLE) 32 gauge x 5/32" Ndle Use as directed for semaglutide injection 100 each 1    semaglutide (OZEMPIC) 0.25 mg or 0.5 mg(2 mg/1.5 mL) pen injector Inject 0.25 mg into the skin every 7 days for 28 days, THEN 0.5 mg every 7 days. 3 pen 0    tizanidine 4 mg Cap Take by mouth.      triamterene-hydrochlorothiazide 37.5-25 mg (MAXZIDE-25) 37.5-25 mg per tablet Take 1 tablet by mouth daily as needed (leg swelling). 30 tablet 11    [DISCONTINUED] oxyCODONE-acetaminophen (PERCOCET)  mg per tablet Take 1 tablet by mouth every 4 (four) hours as needed for Pain. 28 tablet 0     No facility-administered encounter medications on file as of 4/12/2023.       Allergies:  Review of patient's allergies indicates:  No Known Allergies    Health Maintenance:  Immunization History   Administered Date(s) Administered    COVID-19, MRNA, LN-S, PF (MODERNA FULL 0.5 ML DOSE) 03/16/2021, 04/13/2021    DTP 11/28/1973, 01/31/1974, 03/23/1977, 08/31/1977    Influenza 10/17/2018    Influenza - Quadrivalent 10/19/2016    Influenza - Quadrivalent - PF *Preferred* (6 months and older) 10/02/2017, 10/17/2018    MMR 01/31/1974    OPV 11/28/1973, 01/31/1974, 03/23/1977, 08/31/1977      Health Maintenance   Topic Date Due    Hepatitis C Screening  Never done    TETANUS VACCINE  Never done    Mammogram  12/13/2017    Lipid Panel  10/18/2027        Physical Exam      Vital Signs  Pulse: 85  SpO2: 100 %  BP: " "120/82  BP Location: Right arm  Patient Position: Sitting  Pain Score: 0-No pain  Height and Weight  Height: 5' 4" (162.6 cm)  Weight: 112.8 kg (248 lb 10.9 oz)  BSA (Calculated - sq m): 2.26 sq meters  BMI (Calculated): 42.7  Weight in (lb) to have BMI = 25: 145.3]    Physical Exam  Vitals reviewed.   Constitutional:       Appearance: She is well-developed.   HENT:      Head: Normocephalic and atraumatic.      Right Ear: External ear normal.      Left Ear: External ear normal.   Cardiovascular:      Rate and Rhythm: Normal rate and regular rhythm.      Heart sounds: Normal heart sounds. No murmur heard.  Pulmonary:      Effort: Pulmonary effort is normal.      Breath sounds: Normal breath sounds. No wheezing or rales.   Abdominal:      General: Bowel sounds are normal. There is no distension.      Palpations: Abdomen is soft.      Tenderness: There is no abdominal tenderness.        Laboratory:  CBC:  Recent Labs   Lab 08/11/22 0618   WBC 6.32   RBC 4.78   Hemoglobin 12.3   Hematocrit 37.8   Platelets 255   MCV 79 L   MCH 25.7 L   MCHC 32.5     CMP:  Recent Labs   Lab 08/11/22 0618   Glucose 105   Calcium 8.7   Albumin 4.1   Total Protein 7.9   Sodium 138   Potassium 4.2   CO2 29   Chloride 99   BUN 9   Alkaline Phosphatase 81   ALT 12   AST 19   Total Bilirubin 1.3 H     URINALYSIS:       LIPIDS:  Recent Labs   Lab 08/11/22 0618   TSH 1.050   HDL 81 H   Cholesterol 207 H   Triglycerides 46   LDL Cholesterol 116.8   HDL/Cholesterol Ratio 39.1   Non-HDL Cholesterol 126   Total Cholesterol/HDL Ratio 2.6     TSH:  Recent Labs   Lab 08/11/22 0618   TSH 1.050     A1C:  Recent Labs   Lab 08/11/22 0618   Hemoglobin A1C 5.4       Assessment/Plan     Indira Yousif is a 52 y.o.female with:    1. Chronic bilateral low back pain without sciatica    2. Morbid obesity    3. Encounter for monitoring opioid maintenance therapy  - Toxicology screen, urine       Chronic conditions status updated as per HPI.  Other than " changes above, cont current medications and maintain follow up with specialists.  No follow-ups on file.    No future appointments.    Kendrick Stanley MD  [facility]

## 2023-04-13 LAB
AMPHET+METHAMPHET UR QL: NEGATIVE
BARBITURATES UR QL SCN>200 NG/ML: NEGATIVE
BENZODIAZ UR QL SCN>200 NG/ML: NEGATIVE
BZE UR QL SCN: NEGATIVE
CANNABINOIDS UR QL SCN: NEGATIVE
CREAT UR-MCNC: 281 MG/DL (ref 15–325)
ETHANOL UR-MCNC: <10 MG/DL
METHADONE UR QL SCN>300 NG/ML: NEGATIVE
OPIATES UR QL SCN: NEGATIVE
PCP UR QL SCN>25 NG/ML: NEGATIVE
TOXICOLOGY INFORMATION: NORMAL

## 2023-04-14 ENCOUNTER — LAB VISIT (OUTPATIENT)
Dept: LAB | Facility: HOSPITAL | Age: 53
End: 2023-04-14
Attending: INTERNAL MEDICINE
Payer: COMMERCIAL

## 2023-04-14 DIAGNOSIS — Z00.00 ANNUAL PHYSICAL EXAM: ICD-10-CM

## 2023-04-14 LAB
ALBUMIN SERPL BCP-MCNC: 3.6 G/DL (ref 3.5–5.2)
ALP SERPL-CCNC: 59 U/L (ref 55–135)
ALT SERPL W/O P-5'-P-CCNC: 7 U/L (ref 10–44)
ANION GAP SERPL CALC-SCNC: 9 MMOL/L (ref 8–16)
AST SERPL-CCNC: 11 U/L (ref 10–40)
BASOPHILS # BLD AUTO: 0.02 K/UL (ref 0–0.2)
BASOPHILS NFR BLD: 0.4 % (ref 0–1.9)
BILIRUB SERPL-MCNC: 0.7 MG/DL (ref 0.1–1)
BUN SERPL-MCNC: 17 MG/DL (ref 6–20)
CALCIUM SERPL-MCNC: 9.3 MG/DL (ref 8.7–10.5)
CHLORIDE SERPL-SCNC: 105 MMOL/L (ref 95–110)
CHOLEST SERPL-MCNC: 198 MG/DL (ref 120–199)
CHOLEST/HDLC SERPL: 3 {RATIO} (ref 2–5)
CO2 SERPL-SCNC: 25 MMOL/L (ref 23–29)
CREAT SERPL-MCNC: 0.7 MG/DL (ref 0.5–1.4)
DIFFERENTIAL METHOD: ABNORMAL
EOSINOPHIL # BLD AUTO: 0.1 K/UL (ref 0–0.5)
EOSINOPHIL NFR BLD: 1.2 % (ref 0–8)
ERYTHROCYTE [DISTWIDTH] IN BLOOD BY AUTOMATED COUNT: 15 % (ref 11.5–14.5)
EST. GFR  (NO RACE VARIABLE): >60 ML/MIN/1.73 M^2
ESTIMATED AVG GLUCOSE: 105 MG/DL (ref 68–131)
GLUCOSE SERPL-MCNC: 86 MG/DL (ref 70–110)
HBA1C MFR BLD: 5.3 % (ref 4–5.6)
HCT VFR BLD AUTO: 37.8 % (ref 37–48.5)
HDLC SERPL-MCNC: 66 MG/DL (ref 40–75)
HDLC SERPL: 33.3 % (ref 20–50)
HGB BLD-MCNC: 11.6 G/DL (ref 12–16)
IMM GRANULOCYTES # BLD AUTO: 0.01 K/UL (ref 0–0.04)
IMM GRANULOCYTES NFR BLD AUTO: 0.2 % (ref 0–0.5)
LDLC SERPL CALC-MCNC: 123.2 MG/DL (ref 63–159)
LYMPHOCYTES # BLD AUTO: 1.9 K/UL (ref 1–4.8)
LYMPHOCYTES NFR BLD: 37.7 % (ref 18–48)
MCH RBC QN AUTO: 25.6 PG (ref 27–31)
MCHC RBC AUTO-ENTMCNC: 30.7 G/DL (ref 32–36)
MCV RBC AUTO: 83 FL (ref 82–98)
MONOCYTES # BLD AUTO: 0.5 K/UL (ref 0.3–1)
MONOCYTES NFR BLD: 9.9 % (ref 4–15)
NEUTROPHILS # BLD AUTO: 2.6 K/UL (ref 1.8–7.7)
NEUTROPHILS NFR BLD: 50.6 % (ref 38–73)
NONHDLC SERPL-MCNC: 132 MG/DL
NRBC BLD-RTO: 0 /100 WBC
PLATELET # BLD AUTO: 237 K/UL (ref 150–450)
PMV BLD AUTO: 12.2 FL (ref 9.2–12.9)
POTASSIUM SERPL-SCNC: 3.9 MMOL/L (ref 3.5–5.1)
PROT SERPL-MCNC: 7.4 G/DL (ref 6–8.4)
RBC # BLD AUTO: 4.53 M/UL (ref 4–5.4)
SODIUM SERPL-SCNC: 139 MMOL/L (ref 136–145)
T4 FREE SERPL-MCNC: 1.1 NG/DL (ref 0.71–1.51)
TRIGL SERPL-MCNC: 44 MG/DL (ref 30–150)
TSH SERPL DL<=0.005 MIU/L-ACNC: 0.38 UIU/ML (ref 0.4–4)
WBC # BLD AUTO: 5.15 K/UL (ref 3.9–12.7)

## 2023-04-14 PROCEDURE — 84439 ASSAY OF FREE THYROXINE: CPT | Performed by: INTERNAL MEDICINE

## 2023-04-14 PROCEDURE — 83036 HEMOGLOBIN GLYCOSYLATED A1C: CPT | Performed by: INTERNAL MEDICINE

## 2023-04-14 PROCEDURE — 80053 COMPREHEN METABOLIC PANEL: CPT | Performed by: INTERNAL MEDICINE

## 2023-04-14 PROCEDURE — 85025 COMPLETE CBC W/AUTO DIFF WBC: CPT | Performed by: INTERNAL MEDICINE

## 2023-04-14 PROCEDURE — 80061 LIPID PANEL: CPT | Performed by: INTERNAL MEDICINE

## 2023-04-14 PROCEDURE — 84443 ASSAY THYROID STIM HORMONE: CPT | Performed by: INTERNAL MEDICINE

## 2023-04-14 PROCEDURE — 36415 COLL VENOUS BLD VENIPUNCTURE: CPT | Performed by: INTERNAL MEDICINE

## 2023-04-19 ENCOUNTER — HOSPITAL ENCOUNTER (OUTPATIENT)
Dept: RADIOLOGY | Facility: HOSPITAL | Age: 53
Discharge: HOME | End: 2023-04-19
Attending: INTERNAL MEDICINE
Payer: COMMERCIAL

## 2023-04-19 VITALS — BODY MASS INDEX: 42.34 KG/M2 | HEIGHT: 64 IN | WEIGHT: 248 LBS

## 2023-04-19 DIAGNOSIS — Z12.31 SCREENING MAMMOGRAM, ENCOUNTER FOR: ICD-10-CM

## 2023-04-19 PROCEDURE — 77067 SCR MAMMO BI INCL CAD: CPT | Mod: 26,,, | Performed by: RADIOLOGY

## 2023-04-19 PROCEDURE — 77067 SCR MAMMO BI INCL CAD: CPT | Mod: TC

## 2023-04-19 PROCEDURE — 77067 MAMMO DIGITAL SCREENING BILAT WITH TOMO: ICD-10-PCS | Mod: 26,,, | Performed by: RADIOLOGY

## 2023-04-19 PROCEDURE — 77063 MAMMO DIGITAL SCREENING BILAT WITH TOMO: ICD-10-PCS | Mod: 26,,, | Performed by: RADIOLOGY

## 2023-04-19 PROCEDURE — 77063 BREAST TOMOSYNTHESIS BI: CPT | Mod: 26,,, | Performed by: RADIOLOGY

## 2023-04-28 DIAGNOSIS — E66.01 MORBID OBESITY: ICD-10-CM

## 2023-04-28 DIAGNOSIS — R73.01 IMPAIRED FASTING GLUCOSE: ICD-10-CM

## 2023-04-28 NOTE — TELEPHONE ENCOUNTER
No care due was identified.  [facility] Embedded Care Due Messages. Reference number: [id number].   4/28/2023 6:51:10 PM CDT

## 2023-04-29 NOTE — TELEPHONE ENCOUNTER
Refill Routing Note   Medication(s) are not appropriate for processing by [facility] for the following reason(s):      Pending updated directions for titrate    [facility] action(s):  Defer       Medication Therapy Plan: Pending updated directions for titrate      Appointments  past 12m or future 3m with PCP    Date Provider   Last Visit   4/12/2023 Kendrick Stanley MD   Next Visit   7/18/2023 Kendrick Stanley MD   ED visits in past 90 days: 0        Note composed:1:59 PM 04/29/2023

## 2023-05-01 RX ORDER — SEMAGLUTIDE 0.68 MG/ML
0.5 INJECTION, SOLUTION SUBCUTANEOUS
Qty: 3 ML | Refills: 2 | Status: SHIPPED | OUTPATIENT
Start: 2023-05-01 | End: 2023-05-22

## 2023-05-03 DIAGNOSIS — M54.50 CHRONIC BILATERAL LOW BACK PAIN WITHOUT SCIATICA: ICD-10-CM

## 2023-05-03 DIAGNOSIS — G89.29 CHRONIC BILATERAL LOW BACK PAIN WITHOUT SCIATICA: ICD-10-CM

## 2023-05-04 RX ORDER — OXYCODONE AND ACETAMINOPHEN 10; 325 MG/1; MG/1
1 TABLET ORAL EVERY 4 HOURS PRN
Qty: 21 TABLET | Refills: 0 | Status: SHIPPED | OUTPATIENT
Start: 2023-05-04 | End: 2023-06-02 | Stop reason: SDUPTHER

## 2023-05-04 RX ORDER — IBUPROFEN 800 MG/1
800 TABLET ORAL 3 TIMES DAILY
Qty: 90 TABLET | Refills: 5 | Status: SHIPPED | OUTPATIENT
Start: 2023-05-04 | End: 2023-06-19

## 2023-05-04 NOTE — TELEPHONE ENCOUNTER
Refill Routing Note   Medication(s) are not appropriate for processing by [facility] for the following reason(s):      Medication outside of protocol    [facility] action(s):  Route              Appointments  past 12m or future 3m with PCP    Date Provider   Last Visit   4/12/2023 Kendrick Stanley MD   Next Visit   7/18/2023 Kendrick Stanley MD   ED visits in past 90 days: 0        Note composed:4:24 AM 05/04/2023

## 2023-05-22 ENCOUNTER — LAB VISIT (OUTPATIENT)
Dept: LAB | Facility: HOSPITAL | Age: 53
End: 2023-05-22
Attending: INTERNAL MEDICINE
Payer: COMMERCIAL

## 2023-05-22 ENCOUNTER — OFFICE VISIT (OUTPATIENT)
Dept: PRIMARY CARE CLINIC | Facility: CLINIC | Age: 53
End: 2023-05-22
Payer: COMMERCIAL

## 2023-05-22 VITALS
BODY MASS INDEX: 42.23 KG/M2 | HEART RATE: 65 BPM | OXYGEN SATURATION: 100 % | HEIGHT: 64 IN | DIASTOLIC BLOOD PRESSURE: 80 MMHG | WEIGHT: 247.38 LBS | SYSTOLIC BLOOD PRESSURE: 120 MMHG

## 2023-05-22 DIAGNOSIS — Z11.3 SCREEN FOR STD (SEXUALLY TRANSMITTED DISEASE): ICD-10-CM

## 2023-05-22 DIAGNOSIS — E66.01 MORBID OBESITY: Primary | ICD-10-CM

## 2023-05-22 DIAGNOSIS — Z11.59 SCREENING FOR VIRAL DISEASE: ICD-10-CM

## 2023-05-22 DIAGNOSIS — R73.01 IMPAIRED FASTING GLUCOSE: ICD-10-CM

## 2023-05-22 LAB
HAV IGM SERPL QL IA: NORMAL
HBV CORE IGM SERPL QL IA: NORMAL
HBV SURFACE AG SERPL QL IA: NORMAL
HCV AB SERPL QL IA: NORMAL
HIV 1+2 AB+HIV1 P24 AG SERPL QL IA: NORMAL

## 2023-05-22 PROCEDURE — 87389 HIV-1 AG W/HIV-1&-2 AB AG IA: CPT | Performed by: INTERNAL MEDICINE

## 2023-05-22 PROCEDURE — 3074F PR MOST RECENT SYSTOLIC BLOOD PRESSURE < 130 MM HG: ICD-10-PCS | Mod: CPTII,S$GLB,, | Performed by: INTERNAL MEDICINE

## 2023-05-22 PROCEDURE — 3008F BODY MASS INDEX DOCD: CPT | Mod: CPTII,S$GLB,, | Performed by: INTERNAL MEDICINE

## 2023-05-22 PROCEDURE — 99213 PR OFFICE/OUTPT VISIT, EST, LEVL III, 20-29 MIN: ICD-10-PCS | Mod: S$GLB,,, | Performed by: INTERNAL MEDICINE

## 2023-05-22 PROCEDURE — 3074F SYST BP LT 130 MM HG: CPT | Mod: CPTII,S$GLB,, | Performed by: INTERNAL MEDICINE

## 2023-05-22 PROCEDURE — 99213 OFFICE O/P EST LOW 20 MIN: CPT | Mod: S$GLB,,, | Performed by: INTERNAL MEDICINE

## 2023-05-22 PROCEDURE — 3044F PR MOST RECENT HEMOGLOBIN A1C LEVEL <7.0%: ICD-10-PCS | Mod: CPTII,S$GLB,, | Performed by: INTERNAL MEDICINE

## 2023-05-22 PROCEDURE — 3079F DIAST BP 80-89 MM HG: CPT | Mod: CPTII,S$GLB,, | Performed by: INTERNAL MEDICINE

## 2023-05-22 PROCEDURE — 99999 PR PBB SHADOW E&M-EST. PATIENT-LVL III: CPT | Mod: PBBFAC,,, | Performed by: INTERNAL MEDICINE

## 2023-05-22 PROCEDURE — 99999 PR PBB SHADOW E&M-EST. PATIENT-LVL III: ICD-10-PCS | Mod: PBBFAC,,, | Performed by: INTERNAL MEDICINE

## 2023-05-22 PROCEDURE — 86592 SYPHILIS TEST NON-TREP QUAL: CPT | Performed by: INTERNAL MEDICINE

## 2023-05-22 PROCEDURE — 80074 ACUTE HEPATITIS PANEL: CPT | Performed by: INTERNAL MEDICINE

## 2023-05-22 PROCEDURE — 36415 COLL VENOUS BLD VENIPUNCTURE: CPT | Performed by: INTERNAL MEDICINE

## 2023-05-22 PROCEDURE — 3008F PR BODY MASS INDEX (BMI) DOCUMENTED: ICD-10-PCS | Mod: CPTII,S$GLB,, | Performed by: INTERNAL MEDICINE

## 2023-05-22 PROCEDURE — 3079F PR MOST RECENT DIASTOLIC BLOOD PRESSURE 80-89 MM HG: ICD-10-PCS | Mod: CPTII,S$GLB,, | Performed by: INTERNAL MEDICINE

## 2023-05-22 PROCEDURE — 3044F HG A1C LEVEL LT 7.0%: CPT | Mod: CPTII,S$GLB,, | Performed by: INTERNAL MEDICINE

## 2023-05-22 PROCEDURE — 86695 HERPES SIMPLEX TYPE 1 TEST: CPT | Performed by: INTERNAL MEDICINE

## 2023-05-22 RX ORDER — SEMAGLUTIDE 1.34 MG/ML
1 INJECTION, SOLUTION SUBCUTANEOUS
Qty: 3 ML | Refills: 11 | Status: SHIPPED | OUTPATIENT
Start: 2023-05-22 | End: 2023-07-28 | Stop reason: SDUPTHER

## 2023-05-22 NOTE — PROGRESS NOTES
[facility] Note    Chief Complaint      Chief Complaint   Patient presents with    Follow-up     Wants HIV test done, and check weight from being on Ozempic       History of Present Illness      Indira Yousif is a 52 y.o. female with chronic conditions of allergic rhinosinusitis, chronic low back pain, chronic venous insufficiency who presents today for: requesting STD panel for screening (not having any current symptoms) and increase in ozempic dose for weight loss.  Weight loss has leveled off on current dose and pt would like to try 1 mg.    Past Medical History:  Past Medical History:   Diagnosis Date    Anxiety        Past Surgical History:   has a past surgical history that includes Tubal ligation;  section; Hernia repair; Cyst Removal (Left, 1980); and Hysterectomy.    Family History:  family history includes Diabetes in her mother; Hypertension in her father and mother; Kidney disease in her mother; No Known Problems in her brother, brother, brother, brother, sister, son, and son.     Social History:  Social History     Tobacco Use    Smoking status: Never    Smokeless tobacco: Never   Substance Use Topics    Alcohol use: Yes     Comment: occasionally    Drug use: No       I personally reviewed all past medical, surgical, social and family history.    Review of Systems   Constitutional:  Negative for chills, fever and malaise/fatigue.   Respiratory:  Negative for shortness of breath.    Cardiovascular:  Negative for chest pain.   Gastrointestinal:  Negative for constipation, diarrhea, nausea and vomiting.   Skin:  Negative for rash.   Neurological:  Negative for weakness.   All other systems reviewed and are negative.     Medications:  Outpatient Encounter Medications as of 2023   Medication Sig Dispense Refill    fluticasone propionate (FLONASE) 50 mcg/actuation nasal spray 2 sprays (100 mcg total) by Each Nostril route once daily. 16 g 11    ibuprofen (ADVIL,MOTRIN)  "800 MG tablet Take 1 tablet (800 mg total) by mouth 3 (three) times daily. 90 tablet 5    naloxone (NARCAN) 4 mg/actuation Spry 4mg by nasal route as needed for opioid overdose; may repeat every 2-3 minutes in alternating nostrils until medical help arrives. Call 911 2 each 11    oxyCODONE-acetaminophen (PERCOCET)  mg per tablet Take 1 tablet by mouth every 4 (four) hours as needed for Pain. 21 tablet 0    pen needle, diabetic (PEN NEEDLE) 32 gauge x 5/32" Ndle Use as directed for semaglutide injection 100 each 1    tizanidine 4 mg Cap Take by mouth.      triamterene-hydrochlorothiazide 37.5-25 mg (MAXZIDE-25) 37.5-25 mg per tablet Take 1 tablet by mouth daily as needed (leg swelling). 30 tablet 11    [DISCONTINUED] semaglutide (OZEMPIC) 0.25 mg or 0.5 mg (2 mg/3 mL) pen injector Inject 0.5 mg into the skin every 7 days. 3 mL 2    semaglutide (OZEMPIC) 1 mg/dose (4 mg/3 mL) Inject 1 mg into the skin every 7 days. 3 mL 11     No facility-administered encounter medications on file as of 5/22/2023.       Allergies:  Review of patient's allergies indicates:  No Known Allergies    Health Maintenance:  Immunization History   Administered Date(s) Administered    COVID-19, MRNA, LN-S, PF (MODERNA FULL 0.5 ML DOSE) 03/16/2021, 04/13/2021    DTP 11/28/1973, 01/31/1974, 03/23/1977, 08/31/1977    Influenza 10/17/2018    Influenza - Quadrivalent 10/19/2016    Influenza - Quadrivalent - PF *Preferred* (6 months and older) 10/02/2017, 10/17/2018    MMR 01/31/1974    OPV 11/28/1973, 01/31/1974, 03/23/1977, 08/31/1977      Health Maintenance   Topic Date Due    TETANUS VACCINE  Never done    Mammogram  04/19/2024    Lipid Panel  04/14/2028    Hepatitis C Screening  Completed        Physical Exam      Vital Signs  Pulse: 65  SpO2: 100 %  BP: 120/80  BP Location: Right arm  Patient Position: Sitting  Height and Weight  Height: 5' 4" (162.6 cm)  Weight: 112.2 kg (247 lb 5.7 oz)  BSA (Calculated - sq m): 2.25 sq meters  BMI " (Calculated): 42.4  Weight in (lb) to have BMI = 25: 145.3]    Physical Exam  Vitals reviewed.   Constitutional:       Appearance: She is well-developed.   HENT:      Head: Normocephalic and atraumatic.      Right Ear: External ear normal.      Left Ear: External ear normal.   Cardiovascular:      Rate and Rhythm: Normal rate and regular rhythm.      Heart sounds: Normal heart sounds. No murmur heard.  Pulmonary:      Effort: Pulmonary effort is normal.      Breath sounds: Normal breath sounds. No wheezing or rales.   Abdominal:      General: Bowel sounds are normal. There is no distension.      Palpations: Abdomen is soft.      Tenderness: There is no abdominal tenderness.        Laboratory:  CBC:  Recent Labs   Lab 08/11/22 0618 04/14/23  0818   WBC 6.32 5.15   RBC 4.78 4.53   Hemoglobin 12.3 11.6 L   Hematocrit 37.8 37.8   Platelets 255 237   MCV 79 L 83   MCH 25.7 L 25.6 L   MCHC 32.5 30.7 L     CMP:  Recent Labs   Lab 08/11/22 0618 04/14/23  0818   Glucose 105 86   Calcium 8.7 9.3   Albumin 4.1 3.6   Total Protein 7.9 7.4   Sodium 138 139   Potassium 4.2 3.9   CO2 29 25   Chloride 99 105   BUN 9 17   Alkaline Phosphatase 81 59   ALT 12 7 L   AST 19 11   Total Bilirubin 1.3 H 0.7     URINALYSIS:       LIPIDS:  Recent Labs   Lab 08/11/22 0618 04/14/23  0818   TSH 1.050 0.378 L   HDL 81 H 66   Cholesterol 207 H 198   Triglycerides 46 44   LDL Cholesterol 116.8 123.2   HDL/Cholesterol Ratio 39.1 33.3   Non-HDL Cholesterol 126 132   Total Cholesterol/HDL Ratio 2.6 3.0     TSH:  Recent Labs   Lab 08/11/22 0618 04/14/23  0818   TSH 1.050 0.378 L     A1C:  Recent Labs   Lab 08/11/22 0618 04/14/23  0818   Hemoglobin A1C 5.4 5.3       Assessment/Plan     Indira Yousif is a 52 y.o.female with:    1. Morbid obesity  - semaglutide (OZEMPIC) 1 mg/dose (4 mg/3 mL); Inject 1 mg into the skin every 7 days.  Dispense: 3 mL; Refill: 11    2. Impaired fasting glucose  - semaglutide (OZEMPIC) 1 mg/dose (4 mg/3 mL); Inject 1  mg into the skin every 7 days.  Dispense: 3 mL; Refill: 11    3. Screen for STD (sexually transmitted disease)  - RPR; Future  - HIV 1/2 Ag/Ab (4th Gen); Future  - HSV 1 & 2, IgG; Future  - Hepatitis Panel, Acute; Future    4. Screening for viral disease  - RPR; Future  - HIV 1/2 Ag/Ab (4th Gen); Future  - HSV 1 & 2, IgG; Future  - Hepatitis Panel, Acute; Future       Chronic conditions status updated as per HPI.  Other than changes above, cont current medications and maintain follow up with specialists.  No follow-ups on file.    Future Appointments   Date Time Provider Department Center   7/5/2023  8:00 AM MD ASA Benitez   7/18/2023  9:30 AM MD ASA Benitez MD  [facility]

## 2023-05-23 LAB
HSV1 IGG SERPL QL IA: POSITIVE
HSV2 IGG SERPL QL IA: NEGATIVE
RPR SER QL: NORMAL

## 2023-05-30 ENCOUNTER — TELEPHONE (OUTPATIENT)
Dept: PRIMARY CARE CLINIC | Facility: CLINIC | Age: 53
End: 2023-05-30
Payer: COMMERCIAL

## 2023-05-30 NOTE — TELEPHONE ENCOUNTER
----- Message from Lucrecia Santos sent at 5/30/2023  3:46 PM CDT -----  Contact: pt [phone]  Requesting an RX refill or new RX.  Is this a refill or new RX: Refill  RX name and strength: oxyCODONE-acetaminophen (PERCOCET)  mg per tablet  Is this a 30 day or 90 day RX: 30 day   Patient advised that in the future they can use their [facility] account to request a refill?:  n/a  Pharmacy name and phone #:   [facility]  [address]  [place] [address]  Phone: [phone] Fax: [phone]    Comments: Requesting more than 21    Thank You

## 2023-06-02 DIAGNOSIS — M54.50 CHRONIC BILATERAL LOW BACK PAIN WITHOUT SCIATICA: ICD-10-CM

## 2023-06-02 DIAGNOSIS — G89.29 CHRONIC BILATERAL LOW BACK PAIN WITHOUT SCIATICA: ICD-10-CM

## 2023-06-02 RX ORDER — OXYCODONE AND ACETAMINOPHEN 10; 325 MG/1; MG/1
1 TABLET ORAL EVERY 4 HOURS PRN
Qty: 21 TABLET | Refills: 0 | Status: SHIPPED | OUTPATIENT
Start: 2023-06-02 | End: 2023-06-15 | Stop reason: SDUPTHER

## 2023-06-02 NOTE — TELEPHONE ENCOUNTER
----- Message from Venessa Gordon sent at 6/2/2023  9:39 AM CDT -----  Contact: Self/[phone]  Requesting an RX refill or new RX.  Is this a refill or new RX: New  RX name and strength :  oxyCODONE-acetaminophen (PERCOCET)  mg per tablet  Is this a 30 day or 90 day RX: 30  Pharmacy name and phone # :  [facility]  [address] [address]  [place] [address]  Phone: [phone] Fax: [phone]      The doctors have asked that we provide their patients with the following 2 reminders -- prescription refills can take up to 72 hours, and a friendly reminder that in the future you can use your [facility] account to request refills:

## 2023-06-05 ENCOUNTER — TELEPHONE (OUTPATIENT)
Dept: PRIMARY CARE CLINIC | Facility: CLINIC | Age: 53
End: 2023-06-05
Payer: COMMERCIAL

## 2023-06-05 NOTE — TELEPHONE ENCOUNTER
Hepatitis negative.  HIV negative.  Syphilis negative.  Genital herpes negative.  Type 1 herpes positive (cold sores).  No STDs detected

## 2023-06-05 NOTE — TELEPHONE ENCOUNTER
----- Message from Lucrecia Santos sent at 6/5/2023  1:26 PM CDT -----  Contact: pt [phone]  Type: Test Results    What test was performed? lab    Who ordered the test? You    When and where were the test performed? 05/22/2023 [place]    Would you like response via [facility] [place]: no    Comments:    Please call and advise.    Thank You

## 2023-06-13 ENCOUNTER — OFFICE VISIT (OUTPATIENT)
Dept: URGENT CARE | Facility: CLINIC | Age: 53
End: 2023-06-13
Payer: OTHER MISCELLANEOUS

## 2023-06-13 VITALS
OXYGEN SATURATION: 96 % | DIASTOLIC BLOOD PRESSURE: 83 MMHG | BODY MASS INDEX: 42.17 KG/M2 | HEIGHT: 64 IN | HEART RATE: 73 BPM | TEMPERATURE: 98 F | WEIGHT: 247 LBS | RESPIRATION RATE: 16 BRPM | SYSTOLIC BLOOD PRESSURE: 132 MMHG

## 2023-06-13 DIAGNOSIS — S39.012A LUMBAR STRAIN, INITIAL ENCOUNTER: ICD-10-CM

## 2023-06-13 DIAGNOSIS — Z02.6 ENCOUNTER RELATED TO WORKER'S COMPENSATION CLAIM: ICD-10-CM

## 2023-06-13 DIAGNOSIS — S09.90XA CLOSED HEAD INJURY, INITIAL ENCOUNTER: ICD-10-CM

## 2023-06-13 DIAGNOSIS — S46.911A RIGHT SHOULDER STRAIN, INITIAL ENCOUNTER: ICD-10-CM
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
## 2023-06-13 DIAGNOSIS — S50.01XA CONTUSION OF RIGHT ELBOW, INITIAL ENCOUNTER: ICD-10-CM

## 2023-06-13 PROCEDURE — 80305 DRUG TEST PRSMV DIR OPT OBS: CPT | Mod: QW,S$GLB,, | Performed by: PHYSICIAN ASSISTANT

## 2023-06-13 PROCEDURE — 72100 XR LUMBAR SPINE 2 OR 3 VIEWS: ICD-10-PCS | Mod: S$GLB,,, | Performed by: RADIOLOGY

## 2023-06-13 PROCEDURE — 99204 PR OFFICE/OUTPT VISIT, NEW, LEVL IV, 45-59 MIN: ICD-10-PCS | Mod: S$GLB,,, | Performed by: PHYSICIAN ASSISTANT

## 2023-06-13 PROCEDURE — 73080 X-RAY EXAM OF ELBOW: CPT | Mod: RT,S$GLB,, | Performed by: RADIOLOGY

## 2023-06-13 PROCEDURE — 80305 OOH NON-DOT DRUG SCREEN: ICD-10-PCS | Mod: QW,S$GLB,, | Performed by: PHYSICIAN ASSISTANT

## 2023-06-13 PROCEDURE — 73030 X-RAY EXAM OF SHOULDER: CPT | Mod: RT,S$GLB,, | Performed by: RADIOLOGY

## 2023-06-13 PROCEDURE — 99204 OFFICE O/P NEW MOD 45 MIN: CPT | Mod: S$GLB,,, | Performed by: PHYSICIAN ASSISTANT

## 2023-06-13 PROCEDURE — 72100 X-RAY EXAM L-S SPINE 2/3 VWS: CPT | Mod: S$GLB,,, | Performed by: RADIOLOGY

## 2023-06-13 PROCEDURE — 73030 XR SHOULDER TRAUMA 3 VIEW RIGHT: ICD-10-PCS | Mod: RT,S$GLB,, | Performed by: RADIOLOGY

## 2023-06-13 PROCEDURE — 73080 XR ELBOW COMPLETE 3 VIEW RIGHT: ICD-10-PCS | Mod: RT,S$GLB,, | Performed by: RADIOLOGY

## 2023-06-13 RX ORDER — NAPROXEN 500 MG/1
500 TABLET ORAL 2 TIMES DAILY PRN
Qty: 14 TABLET | Refills: 0 | Status: SHIPPED | OUTPATIENT
Start: 2023-06-13 | End: 2023-06-20

## 2023-06-13 NOTE — LETTER
[facility]  [address] [address]  Phone: [phone]  Fax: [phone]  [facility]: [email or web address]    Pt Name: Indira Yousif  Injury Date: 06/13/2023   Employee ID: [id number] Date of First Treatment: 06/13/2023   Company: [employer]      Appointment Time: 04:30 PM Arrived: 4:30   Provider: Elva Milan PA-C Time Out:5:45     Office Treatment:   1. Fall, initial encounter    2. Lumbar strain, initial encounter    3. Contusion of right elbow, initial encounter    4. Right shoulder strain, initial encounter    5. Closed head injury, initial encounter    6. Encounter related to worker's compensation claim      Medications Ordered This Encounter   Medications    naproxen (NAPROSYN) 500 MG tablet      Patient Instructions: Attention not to aggravate affected area    Restrictions: No lifting/pushing/pulling more than 10 lbs, Sit or stand as needed, No Prolonged standing/walking, Avoid frequent bending/lifting/twisting     Return Appointment: 6/19/2023 at 2:00pm

## 2023-06-13 NOTE — PROGRESS NOTES
Subjective:      Patient ID: Indira Yousif is a 52 y.o. female.    Chief Complaint: Arm Injury, Shoulder Injury, Elbow Injury, Head Injury, and Back Pain    Ms. Yousif presents for evaluation of multiple injuries after a fall, DOI 6/13/23.  She is a  at [employer].  She reports she slipped in water on the floor, causing her to fall backward.  She broke her fall with her right arm.  She did hit the back of her head.  She denies any LOC.  She is not taking antiplatelet or anticoagulant agents.  She initially felt a little dizzy and had a headache, now resolved.  She denies any extremity paresthesias, weakness, balance difficulty, light/sound sensitivity, N/V.  She reports her pain is currently in her right lower back as well as the right elbow and shoulder.  She denies any new BLE pain.  She did take an ibuprofen with some relief in symptoms.    Of note, she has a history of chronic LBP.  She has seen pain management in the past & had KAYLENE.  She is currently managed by her PCP and takes Percocet PRN.  Her back pain is daily, aching LBP that sometimes radiates into the legs.  She reports it goes all the way to her toes, usually worse on the left.   No baseline strength deficit.  She denies any prior back surgery.    See MA note Below.  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   HENT: Negative.     Neck: neck negative. Negative for neck pain and neck stiffness.   Cardiovascular: Negative.    Respiratory: Negative.     Musculoskeletal:  Positive for pain, trauma, joint pain, back pain  and muscle ache.   Skin: Negative.    Neurological:  Negative for numbness and tingling.   Objective:     Physical Exam  Vitals and nursing note reviewed.   Constitutional:       General: She is not in acute distress.     Appearance: Normal appearance. She is morbidly obese. She is not ill-appearing.   HENT:      Head: Normocephalic and atraumatic. No raccoon eyes, Burns's sign, abrasion, contusion or laceration.      Comments: No scalp contusion, abrasion or hematoma.    There is no TTP throughout scalp.     Right Ear: Tympanic membrane, ear canal and external ear normal. There is no impacted cerumen.      Left Ear: Tympanic membrane, ear canal and external ear normal. There is no impacted cerumen.      Nose: Nose normal. No congestion or rhinorrhea.      Mouth/Throat:      Mouth: Mucous membranes are moist.      Pharynx: No oropharyngeal exudate or posterior oropharyngeal erythema.   Eyes:      General: No visual field deficit.     Extraocular Movements: Extraocular movements intact.      Conjunctiva/sclera: Conjunctivae normal.      Pupils: Pupils are equal, round, and reactive to light.   Cardiovascular:      Rate and Rhythm: Normal rate and regular rhythm.      Pulses: Normal pulses.      Heart sounds: Normal heart sounds.   Pulmonary:      Effort: Pulmonary effort is normal.      Breath sounds: Normal breath sounds.   Abdominal:      General: Bowel sounds are normal.      Palpations: Abdomen is soft.   Musculoskeletal:      Right shoulder: Tenderness present. No swelling, deformity, effusion, laceration, bony tenderness or crepitus. Normal range of motion. Normal strength. Normal pulse.      Right elbow: No swelling, deformity, effusion or lacerations. Normal range of motion. Tenderness present.        Arms:       Cervical back: Normal and normal range of motion.      Thoracic back: Normal.      Lumbar back: Tenderness and bony tenderness present. Normal range of motion. Negative right straight leg raise test  and negative left straight leg raise test.        Back:       Comments: No TTP throughout C/T spine.    No step-offs appreciated.  TTP lumbar spine & paraspinal musculature, R>L.  BLE 5/5 HF, KF, KE, DF, PF, EHL.  Sensation intact.   BLE DTR 2+ & symmetric.  FROM with flexion, extension and side bending bilaterally.  There is pain with ROM.  SLR neg bilaterally.    R shoulder with TTP posterior shoulder.  FROM of shoulder.  Pain with abduction.    R elbow with TTP olecranon process.  No edema, skin intact.  No ecchymosis.  FROM of the elbow.   Skin:     General: Skin is warm.      Capillary Refill: Capillary refill takes less than 2 seconds.   Neurological:      General: No focal deficit present.      Mental Status: She is alert and oriented to person, place, and time.      GCS: GCS eye subscore is 4. GCS verbal subscore is 5. GCS motor subscore is 6.      Cranial Nerves: Cranial nerves 2-12 are intact. No cranial nerve deficit, dysarthria or facial asymmetry.      Sensory: Sensation is intact. No sensory deficit.      Motor: Motor function is intact. No weakness, tremor, atrophy, abnormal muscle tone, seizure activity or pronator drift.      Coordination: Coordination is intact. Coordination normal. Finger-Nose-Finger Test normal. Rapid alternating movements normal.      Gait: Gait is intact. Gait normal.   Psychiatric:         Mood and Affect: Mood normal.         Behavior: Behavior normal.         Thought Content: Thought content normal.         Judgment: Judgment normal.          XR Lumbar spine - No acute findings, radiology read pending.    XR R shoulder - No acute findings, radiology read pending.    XR R elbow - No acute findings, radiology read pending.    Assessment:      1. Fall, initial encounter    2. Lumbar strain, initial encounter    3. Contusion of right elbow, initial encounter    4. Right shoulder strain, initial encounter    5. Closed head injury, initial encounter    6. Encounter related to  worker's compensation claim      Plan:     Ms. Yosuif is neurologically intact on exam with no signs of concussion at this time.  There were no acute findings on my interpretation of the x-rays and I discussed with this with the patient.  The radiologist's interpretation was pending at the completion of the visit and I informed the patient that they will be notified if the interpretation differs.   I have Rx naproxen PRN.  She also takes percocet chronically & may take that as needed/prescribed to her for pain.  I will place her on work restrictions.  Follow up at the beginning of next week.    Diagnoses and plan discussed with the patient, as well as the expected course and duration of her symptoms.  All questions and concerns were addressed prior to discharge.  Emergency department precautions were given.  Patient verbalized understanding of the plan of care.   Note dictated with voice recognition software, please excuse any grammatical errors.    Medications Ordered This Encounter   Medications    naproxen (NAPROSYN) 500 MG tablet     Sig: Take 1 tablet (500 mg total) by mouth 2 (two) times daily as needed (pain).     Dispense:  14 tablet     Refill:  0     Patient Instructions: Attention not to aggravate affected area   Restrictions: No lifting/pushing/pulling more than 10 lbs, Sit or stand as needed, No Prolonged standing/walking, Avoid frequent bending/lifting/twisting  Follow up in about 6 days (around 6/19/2023).

## 2023-06-15 DIAGNOSIS — G89.29 CHRONIC BILATERAL LOW BACK PAIN WITHOUT SCIATICA: ICD-10-CM

## 2023-06-15 DIAGNOSIS — M54.50 CHRONIC BILATERAL LOW BACK PAIN WITHOUT SCIATICA: ICD-10-CM

## 2023-06-15 RX ORDER — OXYCODONE AND ACETAMINOPHEN 10; 325 MG/1; MG/1
1 TABLET ORAL EVERY 4 HOURS PRN
Qty: 21 TABLET | Refills: 0 | Status: SHIPPED | OUTPATIENT
Start: 2023-06-15 | End: 2023-07-05 | Stop reason: SDUPTHER

## 2023-06-15 NOTE — TELEPHONE ENCOUNTER
----- Message from Nanda George sent at 6/15/2023  9:52 AM CDT -----  Contact: [phone]  Requesting an RX refill or new RX.  Is this a refill or new RX: refill  RX name and strength (copy/paste from chart):  oxyCODONE-acetaminophen (PERCOCET)  mg per tablet  Is this a 30 day or 90 day RX: 30  Pharmacy name and phone # (copy/paste from chart):    [facility]  [address] [address]  [place] [address]  Phone: [phone] Fax: [phone]    The doctors have asked that we provide their patients with the following 2 reminders -- prescription refills can take up to 72 hours, and a friendly reminder that in the future you can use your [facility] account to request refills: yes pt states to also let you all know she fell at work and is hurting all pover she went to  and was told to contact her dr for her pain meds

## 2023-06-19 ENCOUNTER — OFFICE VISIT (OUTPATIENT)
Dept: URGENT CARE | Facility: CLINIC | Age: 53
End: 2023-06-19
Payer: OTHER MISCELLANEOUS

## 2023-06-19 VITALS
SYSTOLIC BLOOD PRESSURE: 136 MMHG | WEIGHT: 247 LBS | DIASTOLIC BLOOD PRESSURE: 85 MMHG | HEART RATE: 70 BPM | RESPIRATION RATE: 16 BRPM | OXYGEN SATURATION: 96 % | HEIGHT: 64 IN | BODY MASS INDEX: 42.17 KG/M2

## 2023-06-19 DIAGNOSIS — S39.012D LUMBAR STRAIN, SUBSEQUENT ENCOUNTER: ICD-10-CM
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
## 2023-06-19 DIAGNOSIS — S96.911D RIGHT ANKLE STRAIN, SUBSEQUENT ENCOUNTER: ICD-10-CM

## 2023-06-19 DIAGNOSIS — S50.01XD CONTUSION OF RIGHT ELBOW, SUBSEQUENT ENCOUNTER: ICD-10-CM

## 2023-06-19 DIAGNOSIS — S96.911D RIGHT FOOT STRAIN, SUBSEQUENT ENCOUNTER: ICD-10-CM

## 2023-06-19 DIAGNOSIS — S46.911D STRAIN OF RIGHT SHOULDER, SUBSEQUENT ENCOUNTER: ICD-10-CM

## 2023-06-19 DIAGNOSIS — Z02.6 ENCOUNTER RELATED TO WORKER'S COMPENSATION CLAIM: Primary | ICD-10-CM

## 2023-06-19 PROCEDURE — 99214 PR OFFICE/OUTPT VISIT, EST, LEVL IV, 30-39 MIN: ICD-10-PCS | Mod: S$GLB,,,

## 2023-06-19 PROCEDURE — 99214 OFFICE O/P EST MOD 30 MIN: CPT | Mod: S$GLB,,,

## 2023-06-19 RX ORDER — IBUPROFEN 800 MG/1
800 TABLET ORAL 3 TIMES DAILY
Qty: 30 TABLET | Refills: 1 | Status: SHIPPED | OUTPATIENT
Start: 2023-06-19 | End: 2023-10-06 | Stop reason: SDUPTHER

## 2023-06-19 RX ORDER — METHOCARBAMOL 500 MG/1
500 TABLET, FILM COATED ORAL NIGHTLY PRN
Qty: 14 TABLET | Refills: 0 | Status: SHIPPED | OUTPATIENT
Start: 2023-06-19 | End: 2023-07-03

## 2023-06-19 NOTE — LETTER
[facility]  [address] [address]  Phone: [phone]  Fax: [phone]  [facility]: [id number]    Pt Name: Indira Yousif  Injury Date: 06/13/2023   Employee ID: [id number] Date of Treatment: 06/19/2023   Company: [employer]      Appointment Time: 3:00 PM Arrived:  9:54 AM    Provider: Aden Rico, DO Time Out: 11:25 AM      Office Treatment:   1. Encounter related to worker's compensation claim    2. Fall, initial encounter    3. Lumbar strain, subsequent encounter    4. Contusion of right elbow, subsequent encounter    5. Strain of right shoulder, subsequent encounter    6. Right ankle strain, subsequent encounter    7. Right foot strain, subsequent encounter      Medications Ordered This Encounter   Medications    ibuprofen (ADVIL,MOTRIN) 800 MG tablet    methocarbamoL (ROBAXIN) 500 MG Tab           Restrictions: Sit or stand as needed, Avoid frequent bending/lifting/twisting, Avoid climbing/kneeling/squatting, No lifting/pushing/pulling more than 10 lbs, No Prolonged standing/walking     Return Appointment: 6/26/2023 at 10:15 AM JEROME

## 2023-06-19 NOTE — PROGRESS NOTES
Subjective:      Patient ID: Indira Yousif is a 52 y.o. female.    Chief Complaint: Fall    See MA note.  Follow-up evaluation for slip and fall injury resulting in injuries to her right upper extremity, lower back, and now her right foot.  Pain is less intense but she describes it more as a generalized soreness.  She does have limited motion of the right shoulder.  She indicated experiencing right medial ankle and foot pain within a few days after the incident.  She was prescribed naproxen during her last visit but has not noticed much improvement with the use of this medication.  She does state her stiffness symptoms are worse 1st thing in the morning and takes a little bit of time to get going.    Patient's place of employment - [employer]  Patient's job title -   Date of Injury - 6/13/2023  Body part injured - right arm, rt shoulder, rt elbow, head and back  Current work status per last visit - Light Duty/Restrictions  Improved, same, or worse - Improving, but having some soreness  Pain Scale right now (1-10) -  8      Constitution: Negative.   HENT: Negative.     Neck: neck negative. Negative for neck pain and neck stiffness.   Cardiovascular: Negative.    Respiratory: Negative.     Musculoskeletal:  Positive for pain, trauma, joint pain, back pain and muscle ache.   Skin: Negative.  Negative for erythema.   Neurological:  Negative for numbness and tingling.   Objective:     Physical Exam  Vitals and nursing note reviewed.   Constitutional:       General: She is not in acute distress.     Appearance: She is obese.   HENT:      Head: Normocephalic.   Eyes:      General: Lids are normal.      Conjunctiva/sclera: Conjunctivae normal.   Musculoskeletal:      Right shoulder: Tenderness present. No swelling or deformity. Decreased range of motion.      Right elbow: No swelling or deformity. Normal range of motion.        Arms:       Cervical back: Full passive range of motion without  pain and normal range of motion.      Lumbar back: Tenderness present. Decreased range of motion. Negative right straight leg raise test and negative left straight leg raise test.        Back:       Right ankle: No swelling or deformity. Tenderness present. Decreased range of motion. Normal pulse.      Right Achilles Tendon: No defects.      Right foot: Normal capillary refill. Tenderness present. No swelling or deformity. Normal pulse.        Feet:    Skin:     General: Skin is warm.      Capillary Refill: Capillary refill takes less than 2 seconds.      Findings: No abrasion, bruising, ecchymosis, erythema, laceration or wound.   Neurological:      General: No focal deficit present.      Mental Status: She is alert and oriented to person, place, and time.   Psychiatric:         Attention and Perception: Attention normal.         Mood and Affect: Mood and affect normal.         Speech: Speech normal.         Behavior: Behavior normal. Behavior is cooperative.      Assessment:      1. Encounter related to worker's compensation claim    2. Fall, initial encounter    3. Lumbar strain, subsequent encounter    4. Contusion of right elbow, subsequent encounter    5. Strain of right shoulder, subsequent encounter    6. Right ankle strain, subsequent encounter    7. Right foot strain, subsequent encounter      Plan:     Given that she is not noticed much improvement with the prescribed medication, I will ask her to discontinue the naproxen and provide ibuprofen and p.r.n. bedtime Robaxin.  Maintain same work limitations for now.  Reassess status in 1 week.  If she continues to have symptoms that may consider ordering physical therapy at that time.  She is provided a document that is needed for her work that I will complete and in faxed to the number listed on the form.    Medications Ordered This Encounter   Medications    ibuprofen (ADVIL,MOTRIN) 800 MG tablet     Sig: Take 1 tablet (800 mg total) by mouth 3 (three) times  daily.     Dispense:  30 tablet     Refill:  1    methocarbamoL (ROBAXIN) 500 MG Tab     Sig: Take 1 tablet (500 mg total) by mouth nightly as needed.     Dispense:  14 tablet     Refill:  0     I spent a total of 30 minutes on the day of the visit.This includes face to face time and non-face to face time preparing to see the patient (eg, review of tests), obtaining and/or reviewing separately obtained history, documenting clinical information in the electronic or other health record, independently interpreting results and communicating results to the patient/family/caregiver, or care coordinator.          Restrictions: Sit or stand as needed, Avoid frequent bending/lifting/twisting, Avoid climbing/kneeling/squatting, No lifting/pushing/pulling more than 10 lbs, No Prolonged standing/walking  Follow up in about 1 week (around 6/26/2023).

## 2023-06-23 ENCOUNTER — OFFICE VISIT (OUTPATIENT)
Dept: URGENT CARE | Facility: CLINIC | Age: 53
End: 2023-06-23
Payer: OTHER MISCELLANEOUS

## 2023-06-23 VITALS
SYSTOLIC BLOOD PRESSURE: 130 MMHG | BODY MASS INDEX: 42.17 KG/M2 | WEIGHT: 247 LBS | RESPIRATION RATE: 16 BRPM | HEIGHT: 64 IN | OXYGEN SATURATION: 96 % | HEART RATE: 70 BPM | DIASTOLIC BLOOD PRESSURE: 83 MMHG

## 2023-06-23 DIAGNOSIS — S96.911D RIGHT FOOT STRAIN, SUBSEQUENT ENCOUNTER: Primary | ICD-10-CM

## 2023-06-23 DIAGNOSIS — Z02.6 ENCOUNTER RELATED TO WORKER'S COMPENSATION CLAIM: ICD-10-CM
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
## 2023-06-23 DIAGNOSIS — S50.01XD CONTUSION OF RIGHT ELBOW, SUBSEQUENT ENCOUNTER: ICD-10-CM

## 2023-06-23 DIAGNOSIS — S39.012D LUMBAR STRAIN, SUBSEQUENT ENCOUNTER: ICD-10-CM

## 2023-06-23 DIAGNOSIS — S46.911D STRAIN OF RIGHT SHOULDER, SUBSEQUENT ENCOUNTER: ICD-10-CM

## 2023-06-23 PROCEDURE — 73630 XR FOOT COMPLETE 3 VIEW RIGHT: ICD-10-PCS | Mod: RT,S$GLB,, | Performed by: RADIOLOGY

## 2023-06-23 PROCEDURE — 99215 OFFICE O/P EST HI 40 MIN: CPT | Mod: S$GLB,,, | Performed by: STUDENT IN AN ORGANIZED HEALTH CARE EDUCATION/TRAINING PROGRAM

## 2023-06-23 PROCEDURE — 73630 X-RAY EXAM OF FOOT: CPT | Mod: RT,S$GLB,, | Performed by: RADIOLOGY

## 2023-06-23 PROCEDURE — 99215 PR OFFICE/OUTPT VISIT, EST, LEVL V, 40-54 MIN: ICD-10-PCS | Mod: S$GLB,,, | Performed by: STUDENT IN AN ORGANIZED HEALTH CARE EDUCATION/TRAINING PROGRAM

## 2023-06-23 RX ORDER — DICLOFENAC SODIUM 10 MG/G
2 GEL TOPICAL 4 TIMES DAILY
Qty: 100 G | Refills: 0 | Status: SHIPPED | OUTPATIENT
Start: 2023-06-23

## 2023-06-23 NOTE — PROGRESS NOTES
Subjective:      Patient ID: Indira Yousif is a 52 y.o. female.    Chief Complaint: Fall    Patient's place of employment - [employer]  Patient's job title -   Date of Injury - 6/13/2023  Body part injured - right foot, rt shoulder, rt elbow, head and back  Current work status per last visit - Light Duty/Restrictions  Improved, same, or worse - Worse  Pain Scale right now (1-10) -  10    See MA note above. Begin MD note:  Mrs. Yousif is taking ibuprofen and percocet for her pain. She was prescribed Robaxin also but it did not help her symptoms. She says she was previously on tizanidine which worked better but she was taken off of that some time ago. She experienced an acute flare of her chronic low back pain and has some pain in her back at night with turning but she is not experiencing severe symptoms that are keeping her from sleeping. Her back pain is now improved from the injury and is stable and at her pre-injury baseline. Her right elbow pain has resolved and although she hit her head, she did not experience LOC and maintains no headache, dizziness, or mental status changes. Her right shoulder pain is also improving, just a little sore.  She does complain of worsening right foot pain and notices it the most with walking and standing. She denies any numbness, tingling, bruising or swelling in the foot/ankle. Her employer is accommodating the restrictions. No other new complaints or concerns.       Constitution: Negative.   HENT: Negative.     Neck: neck negative. Negative for neck pain and neck stiffness.   Cardiovascular: Negative.    Respiratory: Negative.     Musculoskeletal:  Positive for pain, trauma, joint pain, back pain and muscle ache.   Skin: Negative.  Negative for erythema.   Neurological:  Negative for dizziness, headaches, numbness and tingling.   Objective:     Physical Exam  Vitals and nursing note reviewed.   Constitutional:       General: She is not in acute  distress.     Appearance: She is not ill-appearing.   HENT:      Head: Normocephalic.   Eyes:      Conjunctiva/sclera: Conjunctivae normal.   Pulmonary:      Effort: No respiratory distress.   Musculoskeletal:      Right shoulder: Tenderness present. No swelling, deformity, bony tenderness or crepitus. Normal range of motion. Normal strength.      Right elbow: No swelling or deformity. Normal range of motion. No tenderness.        Arms:       Cervical back: Normal range of motion and neck supple. No tenderness.        Feet:       Comments: Right proximal foot pain with dorsiflexion and eversion. None with inversion of plantar flexion. TTP proximal foot over cuneiform bones. Pain reported to proximal posterior right shoulder with inversion of shoulder and Neda's test. 5/5 strength bilateral upper ext   Skin:     General: Skin is warm and dry.      Findings: No erythema.   Neurological:      Mental Status: She is alert and oriented to person, place, and time.   Psychiatric:         Attention and Perception: Attention normal.         Mood and Affect: Mood normal.         Behavior: Behavior normal.      Assessment:      1. Right foot strain, subsequent encounter    2. Encounter related to worker's compensation claim    3. Lumbar strain, subsequent encounter    4. Contusion of right elbow, subsequent encounter    5. Strain of right shoulder, subsequent encounter    6. Fall, subsequent encounter      Plan:      Patient's symptoms are overall improving. Foot pain is her primary concern at this time. Due to persistent and reportedly worsening pain, xrays obtained today. Radiologist's interpretation pending at the completion of the visit. The patient will be notified of any acute abnormalities noted in the report.    We discussed continuing current meds and applying the topical Voltaren to the area of concern of foot prn. She will maintain same work restrictions and follow up next week. She was in agreement with the plan of  care.     Medications Ordered This Encounter   Medications    diclofenac sodium (VOLTAREN) 1 % Gel     Sig: Apply 2 g topically 4 (four) times daily.     Dispense:  100 g     Refill:  0         Restrictions: No Prolonged standing/walking, No lifting/pushing/pulling more than 10 lbs, Avoid climbing/kneeling/squatting, Avoid frequent bending/lifting/twisting, Sit or stand as needed  Follow up in about 1 week (around 6/30/2023).

## 2023-06-23 NOTE — LETTER
[facility]  [address] [address]  Phone: [phone]  Fax: [phone]  [facility]: [id number]    Pt Name: Indira Yousif  Injury Date: 06/13/2023   Employee ID: [id number] Date of Treatment: 06/23/2023   Company: [employer]      Appointment Time:  Arrived: 11:28 AM    Provider: Samantha Bueno MD Time Out: 1:26 PM      Office Treatment:   1. Encounter related to worker's compensation claim    2. Lumbar strain, subsequent encounter    3. Contusion of right elbow, subsequent encounter    4. Right ankle strain, subsequent encounter    5. Right foot strain, subsequent encounter    6. Strain of right shoulder, subsequent encounter    7. Fall, subsequent encounter      Medications Ordered This Encounter   Medications    diclofenac sodium (VOLTAREN) 1 % Gel           Restrictions: No Prolonged standing/walking, No lifting/pushing/pulling more than 10 lbs, Avoid climbing/kneeling/squatting, Avoid frequent bending/lifting/twisting, Sit or stand as needed     Return Appointment: 6/30/2023 at 11:15 AM JEROME

## 2023-06-30 ENCOUNTER — OFFICE VISIT (OUTPATIENT)
Dept: URGENT CARE | Facility: CLINIC | Age: 53
End: 2023-06-30
Payer: OTHER MISCELLANEOUS

## 2023-06-30 VITALS
RESPIRATION RATE: 18 BRPM | WEIGHT: 247 LBS | TEMPERATURE: 99 F | HEIGHT: 64 IN | HEART RATE: 71 BPM | DIASTOLIC BLOOD PRESSURE: 82 MMHG | BODY MASS INDEX: 42.17 KG/M2 | OXYGEN SATURATION: 98 % | SYSTOLIC BLOOD PRESSURE: 133 MMHG
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
## 2023-06-30 DIAGNOSIS — Z02.6 ENCOUNTER RELATED TO WORKER'S COMPENSATION CLAIM: Primary | ICD-10-CM

## 2023-06-30 DIAGNOSIS — S46.911D STRAIN OF RIGHT SHOULDER, SUBSEQUENT ENCOUNTER: ICD-10-CM

## 2023-06-30 DIAGNOSIS — S50.01XD CONTUSION OF RIGHT ELBOW, SUBSEQUENT ENCOUNTER: ICD-10-CM

## 2023-06-30 DIAGNOSIS — S96.911D RIGHT FOOT STRAIN, SUBSEQUENT ENCOUNTER: ICD-10-CM

## 2023-06-30 PROCEDURE — 99214 PR OFFICE/OUTPT VISIT, EST, LEVL IV, 30-39 MIN: ICD-10-PCS | Mod: S$GLB,,,

## 2023-06-30 PROCEDURE — 99214 OFFICE O/P EST MOD 30 MIN: CPT | Mod: S$GLB,,,

## 2023-06-30 NOTE — PROGRESS NOTES
Subjective:      Patient ID: Indira Yousif is a 52 y.o. female.    Chief Complaint: No chief complaint on file.    Indira returns for re-evaluation of her injuries that she sustained during a fall incident.  Initially it symptoms to the right upper extremity, shoulder, right foot.  During her last evaluation much of her symptoms had improved significantly but her right foot had worsened.  The provider prescribed Voltaren gel to apply to her right foot.  She returns today stating that her symptoms have all resolved.  She is requesting and attending physician statement document be filled out today for submission    Patient's place of employment - [employer]  Patient's job title -   Date of Injury - 06/13/2023  Body part injured - Right elbow, shoulder, foot, back head  Current work status per last visit - Light  Improved, same, or worse - Improved  Pain Scale right now (1-10) -  5    Constitution: Negative.   HENT: Negative.     Neck: neck negative. Negative for neck pain and neck stiffness.   Cardiovascular: Negative.    Respiratory: Negative.     Musculoskeletal:  Positive for pain and back pain. Negative for trauma, joint pain and muscle ache.   Skin: Negative.  Negative for erythema.   Neurological:  Negative for dizziness, headaches, numbness and tingling.   Objective:     Physical Exam  Vitals and nursing note reviewed.   Constitutional:       General: She is not in acute distress.     Appearance: Normal appearance.   HENT:      Head: Normocephalic and atraumatic.   Eyes:      General: Lids are normal.      Conjunctiva/sclera: Conjunctivae normal.   Musculoskeletal:      Right shoulder: Normal. Normal range of motion.      Left shoulder: Normal. Normal range of motion.      Right upper arm: No swelling or deformity.      Left upper arm: No swelling or deformity.      Right elbow: No swelling or deformity. Normal range of motion.      Left elbow: No swelling or deformity. Normal range of  motion.      Cervical back: Full passive range of motion without pain and normal range of motion. No pain with movement. Normal range of motion.      Right ankle: No swelling, deformity or ecchymosis. No tenderness. Normal range of motion. Anterior drawer test negative. Normal pulse.      Right Achilles Tendon: No tenderness or defects.      Right foot: Normal range of motion. No swelling or deformity.   Skin:     General: Skin is warm.      Capillary Refill: Capillary refill takes less than 2 seconds.      Findings: No erythema.   Neurological:      General: No focal deficit present.      Mental Status: She is alert and oriented to person, place, and time.      Gait: Gait is intact.   Psychiatric:         Attention and Perception: Attention and perception normal.         Mood and Affect: Mood and affect normal.         Speech: Speech normal.         Behavior: Behavior is cooperative.      Assessment:      1. Encounter related to worker's compensation claim    2. Contusion of right elbow, subsequent encounter    3. Right foot strain, subsequent encounter    4. Strain of right shoulder, subsequent encounter    5. Fall, subsequent encounter      Plan:   Clinical examination is reassuring and she is no longer experiencing any symptoms.  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  I will complete the necessary forms as requested by her employer insurance company.           Restrictions: Regular Duty, Discharged from Occupational Health  Follow up if symptoms worsen or fail to improve.

## 2023-07-05 ENCOUNTER — LAB VISIT (OUTPATIENT)
Dept: LAB | Facility: HOSPITAL | Age: 53
End: 2023-07-05
Attending: INTERNAL MEDICINE
Payer: COMMERCIAL

## 2023-07-05 ENCOUNTER — OFFICE VISIT (OUTPATIENT)
Dept: PRIMARY CARE CLINIC | Facility: CLINIC | Age: 53
End: 2023-07-05
Payer: COMMERCIAL

## 2023-07-05 VITALS
SYSTOLIC BLOOD PRESSURE: 130 MMHG | DIASTOLIC BLOOD PRESSURE: 80 MMHG | BODY MASS INDEX: 41.74 KG/M2 | WEIGHT: 244.5 LBS | HEIGHT: 64 IN

## 2023-07-05 DIAGNOSIS — Z51.81 ENCOUNTER FOR MONITORING OPIOID MAINTENANCE THERAPY: ICD-10-CM

## 2023-07-05 DIAGNOSIS — M54.50 CHRONIC BILATERAL LOW BACK PAIN WITHOUT SCIATICA: ICD-10-CM

## 2023-07-05 DIAGNOSIS — G89.29 CHRONIC BILATERAL LOW BACK PAIN WITHOUT SCIATICA: ICD-10-CM

## 2023-07-05 DIAGNOSIS — Z79.891 ENCOUNTER FOR MONITORING OPIOID MAINTENANCE THERAPY: ICD-10-CM

## 2023-07-05 DIAGNOSIS — R73.01 IMPAIRED FASTING GLUCOSE: ICD-10-CM

## 2023-07-05 DIAGNOSIS — E66.01 MORBID OBESITY: Primary | ICD-10-CM

## 2023-07-05 LAB
AMPHET+METHAMPHET UR QL: NEGATIVE
BARBITURATES UR QL SCN>200 NG/ML: NEGATIVE
BENZODIAZ UR QL SCN>200 NG/ML: NEGATIVE
BZE UR QL SCN: NEGATIVE
CANNABINOIDS UR QL SCN: NEGATIVE
CREAT UR-MCNC: 247 MG/DL (ref 15–325)
ETHANOL UR-MCNC: <10 MG/DL
METHADONE UR QL SCN>300 NG/ML: NEGATIVE
OPIATES UR QL SCN: ABNORMAL
PCP UR QL SCN>25 NG/ML: NEGATIVE
TOXICOLOGY INFORMATION: ABNORMAL

## 2023-07-05 PROCEDURE — 99214 PR OFFICE/OUTPT VISIT, EST, LEVL IV, 30-39 MIN: ICD-10-PCS | Mod: S$GLB,,, | Performed by: INTERNAL MEDICINE

## 2023-07-05 PROCEDURE — 99214 OFFICE O/P EST MOD 30 MIN: CPT | Mod: S$GLB,,, | Performed by: INTERNAL MEDICINE

## 2023-07-05 PROCEDURE — 3075F PR MOST RECENT SYSTOLIC BLOOD PRESS GE 130-139MM HG: ICD-10-PCS | Mod: CPTII,S$GLB,, | Performed by: INTERNAL MEDICINE

## 2023-07-05 PROCEDURE — 1159F MED LIST DOCD IN RCRD: CPT | Mod: CPTII,S$GLB,, | Performed by: INTERNAL MEDICINE

## 2023-07-05 PROCEDURE — 99999 PR PBB SHADOW E&M-EST. PATIENT-LVL III: ICD-10-PCS | Mod: PBBFAC,,, | Performed by: INTERNAL MEDICINE

## 2023-07-05 PROCEDURE — 3075F SYST BP GE 130 - 139MM HG: CPT | Mod: CPTII,S$GLB,, | Performed by: INTERNAL MEDICINE

## 2023-07-05 PROCEDURE — 3079F DIAST BP 80-89 MM HG: CPT | Mod: CPTII,S$GLB,, | Performed by: INTERNAL MEDICINE

## 2023-07-05 PROCEDURE — 80307 DRUG TEST PRSMV CHEM ANLYZR: CPT | Performed by: INTERNAL MEDICINE

## 2023-07-05 PROCEDURE — 99999 PR PBB SHADOW E&M-EST. PATIENT-LVL III: CPT | Mod: PBBFAC,,, | Performed by: INTERNAL MEDICINE

## 2023-07-05 PROCEDURE — 1159F PR MEDICATION LIST DOCUMENTED IN MEDICAL RECORD: ICD-10-PCS | Mod: CPTII,S$GLB,, | Performed by: INTERNAL MEDICINE

## 2023-07-05 PROCEDURE — 3008F BODY MASS INDEX DOCD: CPT | Mod: CPTII,S$GLB,, | Performed by: INTERNAL MEDICINE

## 2023-07-05 PROCEDURE — 3044F PR MOST RECENT HEMOGLOBIN A1C LEVEL <7.0%: ICD-10-PCS | Mod: CPTII,S$GLB,, | Performed by: INTERNAL MEDICINE

## 2023-07-05 PROCEDURE — 3044F HG A1C LEVEL LT 7.0%: CPT | Mod: CPTII,S$GLB,, | Performed by: INTERNAL MEDICINE

## 2023-07-05 PROCEDURE — 3008F PR BODY MASS INDEX (BMI) DOCUMENTED: ICD-10-PCS | Mod: CPTII,S$GLB,, | Performed by: INTERNAL MEDICINE

## 2023-07-05 PROCEDURE — 3079F PR MOST RECENT DIASTOLIC BLOOD PRESSURE 80-89 MM HG: ICD-10-PCS | Mod: CPTII,S$GLB,, | Performed by: INTERNAL MEDICINE

## 2023-07-05 RX ORDER — OXYCODONE AND ACETAMINOPHEN 10; 325 MG/1; MG/1
1 TABLET ORAL EVERY 4 HOURS PRN
Qty: 21 TABLET | Refills: 0 | Status: SHIPPED | OUTPATIENT
Start: 2023-07-05 | End: 2023-08-03 | Stop reason: SDUPTHER

## 2023-07-05 NOTE — PROGRESS NOTES
[facility] Note    Chief Complaint      Chief Complaint   Patient presents with    Follow-up     3 month and weight check        History of Present Illness      Indira Yousif is a 52 y.o. female with chronic conditions of allergic rhinosinusitis, chronic low back pain, chronic venous insufficiency who presents today for: follow up chronic conditions.  Chronic low back pain: had a fall at work and exacerbated back pain.  Controlling with percocet and robaxin.   Has been losing weight on ozempic.       Past Medical History:  Past Medical History:   Diagnosis Date    Anxiety        Past Surgical History:   has a past surgical history that includes Tubal ligation;  section; Hernia repair; Cyst Removal (Left, 1980); and Hysterectomy.    Family History:  family history includes Diabetes in her mother; Hypertension in her father and mother; Kidney disease in her mother; No Known Problems in her brother, brother, brother, brother, sister, son, and son.     Social History:  Social History     Tobacco Use    Smoking status: Never    Smokeless tobacco: Never   Substance Use Topics    Alcohol use: Yes     Comment: occasionally    Drug use: No       I personally reviewed all past medical, surgical, social and family history.    Review of Systems   Constitutional:  Negative for chills, fever and malaise/fatigue.   Respiratory:  Negative for shortness of breath.    Cardiovascular:  Negative for chest pain.   Gastrointestinal:  Negative for constipation, diarrhea, nausea and vomiting.   Skin:  Negative for rash.   Neurological:  Negative for weakness.   All other systems reviewed and are negative.     Medications:  Outpatient Encounter Medications as of 2023   Medication Sig Dispense Refill    diclofenac sodium (VOLTAREN) 1 % Gel Apply 2 g topically 4 (four) times daily. 100 g 0    fluticasone propionate (FLONASE) 50 mcg/actuation nasal spray 2 sprays (100 mcg total) by Each Nostril route  "once daily. 16 g 11    ibuprofen (ADVIL,MOTRIN) 800 MG tablet Take 1 tablet (800 mg total) by mouth 3 (three) times daily. 30 tablet 1    [] methocarbamoL (ROBAXIN) 500 MG Tab Take 1 tablet (500 mg total) by mouth nightly as needed. 14 tablet 0    naloxone (NARCAN) 4 mg/actuation Spry 4mg by nasal route as needed for opioid overdose; may repeat every 2-3 minutes in alternating nostrils until medical help arrives. Call 911 2 each 11    [] naproxen (NAPROSYN) 500 MG tablet Take 1 tablet (500 mg total) by mouth 2 (two) times daily as needed (pain). 14 tablet 0    oxyCODONE-acetaminophen (PERCOCET)  mg per tablet Take 1 tablet by mouth every 4 (four) hours as needed for Pain. 21 tablet 0    pen needle, diabetic (PEN NEEDLE) 32 gauge x 5/32" Ndle Use as directed for semaglutide injection 100 each 1    semaglutide (OZEMPIC) 1 mg/dose (4 mg/3 mL) Inject 1 mg into the skin every 7 days. 3 mL 11    triamterene-hydrochlorothiazide 37.5-25 mg (MAXZIDE-25) 37.5-25 mg per tablet Take 1 tablet by mouth daily as needed (leg swelling). 30 tablet 11    [DISCONTINUED] ibuprofen (ADVIL,MOTRIN) 800 MG tablet Take 1 tablet (800 mg total) by mouth 3 (three) times daily. 90 tablet 5    [DISCONTINUED] oxyCODONE-acetaminophen (PERCOCET)  mg per tablet Take 1 tablet by mouth every 4 (four) hours as needed for Pain. 21 tablet 0    [DISCONTINUED] oxyCODONE-acetaminophen (PERCOCET)  mg per tablet Take 1 tablet by mouth every 4 (four) hours as needed for Pain. 21 tablet 0    [DISCONTINUED] tizanidine 4 mg Cap Take by mouth.       No facility-administered encounter medications on file as of 2023.       Allergies:  Review of patient's allergies indicates:  No Known Allergies    Health Maintenance:  Immunization History   Administered Date(s) Administered    COVID-19, MRNA, LN-S, PF (MODERNA FULL 0.5 ML DOSE) 2021, 2021    DTP 1973, 1974, 1977, 1977    Influenza 10/17/2018    " "Influenza - Quadrivalent 10/19/2016    Influenza - Quadrivalent - PF *Preferred* (6 months and older) 10/02/2017, 10/17/2018    MMR 01/31/1974    OPV 11/28/1973, 01/31/1974, 03/23/1977, 08/31/1977      Health Maintenance   Topic Date Due    TETANUS VACCINE  Never done    Mammogram  04/19/2024    Lipid Panel  04/14/2028    Hepatitis C Screening  Completed        Physical Exam      Vital Signs  BP: 130/80  BP Location: Left arm  Patient Position: Sitting  Pain Score:   5  Height and Weight  Height: 5' 4" (162.6 cm)  Weight: 110.9 kg (244 lb 7.8 oz)  BSA (Calculated - sq m): 2.24 sq meters  BMI (Calculated): 41.9  Weight in (lb) to have BMI = 25: 145.3]    Physical Exam  Vitals reviewed.   Constitutional:       Appearance: She is well-developed.   HENT:      Head: Normocephalic and atraumatic.      Right Ear: External ear normal.      Left Ear: External ear normal.   Cardiovascular:      Rate and Rhythm: Normal rate and regular rhythm.      Heart sounds: Normal heart sounds. No murmur heard.  Pulmonary:      Effort: Pulmonary effort is normal.      Breath sounds: Normal breath sounds. No wheezing or rales.   Abdominal:      General: Bowel sounds are normal. There is no distension.      Palpations: Abdomen is soft.      Tenderness: There is no abdominal tenderness.        Laboratory:  CBC:  Recent Labs   Lab 08/11/22 0618 04/14/23  0818   WBC 6.32 5.15   RBC 4.78 4.53   Hemoglobin 12.3 11.6 L   Hematocrit 37.8 37.8   Platelets 255 237   MCV 79 L 83   MCH 25.7 L 25.6 L   MCHC 32.5 30.7 L     CMP:  Recent Labs   Lab 08/11/22 0618 04/14/23  0818   Glucose 105 86   Calcium 8.7 9.3   Albumin 4.1 3.6   Total Protein 7.9 7.4   Sodium 138 139   Potassium 4.2 3.9   CO2 29 25   Chloride 99 105   BUN 9 17   Alkaline Phosphatase 81 59   ALT 12 7 L   AST 19 11   Total Bilirubin 1.3 H 0.7     URINALYSIS:       LIPIDS:  Recent Labs   Lab 08/11/22  0618 04/14/23  0818   TSH 1.050 0.378 L   HDL 81 H 66   Cholesterol 207 H 198 "   Triglycerides 46 44   LDL Cholesterol 116.8 123.2   HDL/Cholesterol Ratio 39.1 33.3   Non-HDL Cholesterol 126 132   Total Cholesterol/HDL Ratio 2.6 3.0     TSH:  Recent Labs   Lab 08/11/22  0618 04/14/23  0818   TSH 1.050 0.378 L     A1C:  Recent Labs   Lab 08/11/22  0618 04/14/23  0818   Hemoglobin A1C 5.4 5.3       Assessment/Plan     Indira Yousif is a 52 y.o.female with:    1. Morbid obesity  Continue current meds.    2. Impaired fasting glucose  Cont diet and exercise  3. Chronic bilateral low back pain without sciatica  - Toxicology screen, urine; Future  - oxyCODONE-acetaminophen (PERCOCET)  mg per tablet; Take 1 tablet by mouth every 4 (four) hours as needed for Pain.  Dispense: 21 tablet; Refill: 0  Continue current meds.    4. Encounter for monitoring opioid maintenance therapy  - Toxicology screen, urine; Future       Chronic conditions status updated as per HPI.  Other than changes above, cont current medications and maintain follow up with specialists.  No follow-ups on file.    Future Appointments   Date Time Provider Department Center   10/6/2023  9:30 AM [facility]       Kendrick Stanley MD  [facility]

## 2023-07-06 ENCOUNTER — TELEPHONE (OUTPATIENT)
Dept: PHARMACY | Facility: CLINIC | Age: 53
End: 2023-07-06
Payer: COMMERCIAL

## 2023-07-28 ENCOUNTER — PATIENT MESSAGE (OUTPATIENT)
Dept: PRIMARY CARE CLINIC | Facility: CLINIC | Age: 53
End: 2023-07-28
Payer: COMMERCIAL

## 2023-07-28 DIAGNOSIS — R73.01 IMPAIRED FASTING GLUCOSE: ICD-10-CM

## 2023-07-28 DIAGNOSIS — E66.01 MORBID OBESITY: ICD-10-CM

## 2023-07-28 RX ORDER — SEMAGLUTIDE 1.34 MG/ML
1 INJECTION, SOLUTION SUBCUTANEOUS
Qty: 3 ML | Refills: 2 | Status: SHIPPED | OUTPATIENT
Start: 2023-07-28 | End: 2023-10-06

## 2023-08-02 ENCOUNTER — PATIENT MESSAGE (OUTPATIENT)
Dept: PRIMARY CARE CLINIC | Facility: CLINIC | Age: 53
End: 2023-08-02
Payer: COMMERCIAL

## 2023-08-03 ENCOUNTER — PATIENT MESSAGE (OUTPATIENT)
Dept: PRIMARY CARE CLINIC | Facility: CLINIC | Age: 53
End: 2023-08-03
Payer: COMMERCIAL

## 2023-08-03 DIAGNOSIS — M54.50 CHRONIC BILATERAL LOW BACK PAIN WITHOUT SCIATICA: ICD-10-CM

## 2023-08-03 DIAGNOSIS — G89.29 CHRONIC BILATERAL LOW BACK PAIN WITHOUT SCIATICA: ICD-10-CM

## 2023-08-04 DIAGNOSIS — S39.012D LUMBAR STRAIN, SUBSEQUENT ENCOUNTER: ICD-10-CM

## 2023-08-04 DIAGNOSIS — S96.911D RIGHT ANKLE STRAIN, SUBSEQUENT ENCOUNTER: ICD-10-CM

## 2023-08-04 DIAGNOSIS — S50.01XD CONTUSION OF RIGHT ELBOW, SUBSEQUENT ENCOUNTER: ICD-10-CM

## 2023-08-04 DIAGNOSIS — S96.911D RIGHT FOOT STRAIN, SUBSEQUENT ENCOUNTER: ICD-10-CM

## 2023-08-04 DIAGNOSIS — S46.911D STRAIN OF RIGHT SHOULDER, SUBSEQUENT ENCOUNTER: ICD-10-CM

## 2023-08-04 RX ORDER — OXYCODONE AND ACETAMINOPHEN 10; 325 MG/1; MG/1
1 TABLET ORAL EVERY 4 HOURS PRN
Qty: 21 TABLET | Refills: 0 | Status: SHIPPED | OUTPATIENT
Start: 2023-08-04 | End: 2023-09-01 | Stop reason: SDUPTHER

## 2023-08-07 RX ORDER — IBUPROFEN 800 MG/1
800 TABLET ORAL 3 TIMES DAILY
Qty: 30 TABLET | Refills: 1 | OUTPATIENT
Start: 2023-08-07

## 2023-08-11 ENCOUNTER — TELEPHONE (OUTPATIENT)
Dept: PRIMARY CARE CLINIC | Facility: CLINIC | Age: 53
End: 2023-08-11
Payer: COMMERCIAL

## 2023-08-11 NOTE — TELEPHONE ENCOUNTER
----- Message from Elaine Robert sent at 8/11/2023  3:27 PM CDT -----  Contact: [phone]  Patient is calling for Medical Advice regarding: Patient has not had her semaglutide (OZEMPIC) 1 mg/dose (4 mg/3 mL) medication in 3 weeks due to a shortage, please call and advise.    Patient states the pharmacy says that they don't have the 1 mg .           TELEPHONE CALL PROGRESS NOTE    1. Has the patient been in close contact with anyone known to have a positive test for COVID-19 or is under active investigation for COVID-19?   Unknown at this time    Patient has been around friend with URI symptoms who came from [facility] in [place] with multiple confirmed coronavirus cases in 03/07/20. However, this friend was never tested for coronavirus.    2. Has the patient had any travel outside the state of [place]?  no    3. Does the patient live in an area with a confirmed case of COVID-19?  Yes    4. When was the onset of symptoms? 7 days ago. Congestion, sore throat, mild intermittent shortness of breath, sinus congestion. Symptoms are worse when first waking up in the morning and at night.    5. Does the patient have a chronic health condition for which they are being actively treated for (e.g Asthma, COPD, Heart Disease, Diabetes, Cancer)   yes    Asthma    Current Patient Symptoms  Symptom  Fever >100.4F? no  Headache? no  Runny Nose? yes   Sneezing? yes  Sore Throat? yes  Myalgias? {yes  Fatigue? yes  Cough? no  If cough is present, is it dry?   Dyspnea? yes  Evidence of Respiratory Distress? no      Plan of Care:  ? Based on this questionnaire, does the medical provider feel there is probable cause to have the patient evaluated for COVID-19?    no    ? Was OTC symptomatic control discussed with the patient to include, but not limited to: acetaminophen, pseudoephedrine, guaifenesin, etc?  yes    ? Was the patient referred to the nearest Emergency Room and provider notified the ER directly of the anticipated arrival?  no    Diagnosis:    Educated patient about Covid-19    Viral URI  - Patient wants antibiotics due to persistent symptoms >7 days  - Advised patient to follow up with PCP  - Advised patient to self-quarantine at home    Patient was advised of current guidelines for testing as well as treatment plan for managing current symptoms. Patient also advised to increase to  liberal PO fluid intake. Reinforced covering cough and good hand washing techniques. Isolation precautions reviewed as necessary. Signs of symptoms of worsening reviewed with patient and when to call sooner or when to contact PMD or EMS.     Elana Ulrich,   Internal Medicine PGY-1

## 2023-08-17 ENCOUNTER — PATIENT MESSAGE (OUTPATIENT)
Dept: PRIMARY CARE CLINIC | Facility: CLINIC | Age: 53
End: 2023-08-17
Payer: COMMERCIAL

## 2023-08-17 DIAGNOSIS — R73.01 IMPAIRED FASTING GLUCOSE: Primary | ICD-10-CM

## 2023-08-17 DIAGNOSIS — E66.01 MORBID OBESITY: ICD-10-CM

## 2023-08-18 RX ORDER — SEMAGLUTIDE 0.68 MG/ML
0.5 INJECTION, SOLUTION SUBCUTANEOUS
Qty: 3 ML | Refills: 3 | Status: SHIPPED | OUTPATIENT
Start: 2023-08-18 | End: 2023-10-06

## 2023-08-18 RX ORDER — SEMAGLUTIDE 0.68 MG/ML
0.5 INJECTION, SOLUTION SUBCUTANEOUS
Qty: 3 ML | Refills: 3 | Status: SHIPPED | OUTPATIENT
Start: 2023-08-18 | End: 2023-08-18

## 2023-09-01 ENCOUNTER — PATIENT MESSAGE (OUTPATIENT)
Dept: PRIMARY CARE CLINIC | Facility: CLINIC | Age: 53
End: 2023-09-01
Payer: COMMERCIAL

## 2023-09-01 DIAGNOSIS — M54.50 CHRONIC BILATERAL LOW BACK PAIN WITHOUT SCIATICA: ICD-10-CM

## 2023-09-01 DIAGNOSIS — G89.29 CHRONIC BILATERAL LOW BACK PAIN WITHOUT SCIATICA: ICD-10-CM

## 2023-09-01 RX ORDER — OXYCODONE AND ACETAMINOPHEN 10; 325 MG/1; MG/1
1 TABLET ORAL EVERY 4 HOURS PRN
Qty: 21 TABLET | Refills: 0 | Status: SHIPPED | OUTPATIENT
Start: 2023-09-01 | End: 2023-10-03 | Stop reason: SDUPTHER

## 2023-10-02 ENCOUNTER — PATIENT MESSAGE (OUTPATIENT)
Dept: PRIMARY CARE CLINIC | Facility: CLINIC | Age: 53
End: 2023-10-02
Payer: COMMERCIAL

## 2023-10-02 DIAGNOSIS — M54.50 CHRONIC BILATERAL LOW BACK PAIN WITHOUT SCIATICA: ICD-10-CM

## 2023-10-02 DIAGNOSIS — G89.29 CHRONIC BILATERAL LOW BACK PAIN WITHOUT SCIATICA: ICD-10-CM

## 2023-10-03 RX ORDER — OXYCODONE AND ACETAMINOPHEN 10; 325 MG/1; MG/1
1 TABLET ORAL EVERY 4 HOURS PRN
Qty: 21 TABLET | Refills: 0 | Status: SHIPPED | OUTPATIENT
Start: 2023-10-03 | End: 2023-11-01 | Stop reason: SDUPTHER

## 2023-10-03 NOTE — TELEPHONE ENCOUNTER
No care due was identified.  [facility] Embedded Care Due Messages. Reference number: [id number].   10/03/2023 7:56:30 AM CDT

## 2023-10-06 ENCOUNTER — OFFICE VISIT (OUTPATIENT)
Dept: PRIMARY CARE CLINIC | Facility: CLINIC | Age: 53
End: 2023-10-06
Payer: COMMERCIAL

## 2023-10-06 ENCOUNTER — LAB VISIT (OUTPATIENT)
Dept: LAB | Facility: HOSPITAL | Age: 53
End: 2023-10-06
Attending: INTERNAL MEDICINE
Payer: COMMERCIAL

## 2023-10-06 VITALS
OXYGEN SATURATION: 98 % | BODY MASS INDEX: 42.01 KG/M2 | DIASTOLIC BLOOD PRESSURE: 80 MMHG | HEIGHT: 64 IN | HEART RATE: 80 BPM | SYSTOLIC BLOOD PRESSURE: 134 MMHG | WEIGHT: 246.06 LBS

## 2023-10-06 DIAGNOSIS — Z51.81 ENCOUNTER FOR MONITORING OPIOID MAINTENANCE THERAPY: ICD-10-CM

## 2023-10-06 DIAGNOSIS — Z79.891 ENCOUNTER FOR MONITORING OPIOID MAINTENANCE THERAPY: ICD-10-CM

## 2023-10-06 DIAGNOSIS — M54.50 CHRONIC BILATERAL LOW BACK PAIN WITHOUT SCIATICA: ICD-10-CM

## 2023-10-06 DIAGNOSIS — S39.012D LUMBAR STRAIN, SUBSEQUENT ENCOUNTER: ICD-10-CM

## 2023-10-06 DIAGNOSIS — S46.911D STRAIN OF RIGHT SHOULDER, SUBSEQUENT ENCOUNTER: ICD-10-CM

## 2023-10-06 DIAGNOSIS — S50.01XD CONTUSION OF RIGHT ELBOW, SUBSEQUENT ENCOUNTER: ICD-10-CM

## 2023-10-06 DIAGNOSIS — G89.29 CHRONIC BILATERAL LOW BACK PAIN WITHOUT SCIATICA: ICD-10-CM

## 2023-10-06 DIAGNOSIS — S96.911D RIGHT ANKLE STRAIN, SUBSEQUENT ENCOUNTER: ICD-10-CM

## 2023-10-06 DIAGNOSIS — S96.911D RIGHT FOOT STRAIN, SUBSEQUENT ENCOUNTER: ICD-10-CM

## 2023-10-06 DIAGNOSIS — E66.01 MORBID OBESITY: ICD-10-CM

## 2023-10-06 DIAGNOSIS — M54.50 CHRONIC BILATERAL LOW BACK PAIN WITHOUT SCIATICA: Primary | ICD-10-CM

## 2023-10-06 DIAGNOSIS — G89.29 CHRONIC BILATERAL LOW BACK PAIN WITHOUT SCIATICA: Primary | ICD-10-CM

## 2023-10-06 DIAGNOSIS — M79.671 BILATERAL FOOT PAIN: ICD-10-CM

## 2023-10-06 DIAGNOSIS — M79.672 BILATERAL FOOT PAIN: ICD-10-CM

## 2023-10-06 LAB
AMPHET+METHAMPHET UR QL: NEGATIVE
BARBITURATES UR QL SCN>200 NG/ML: NEGATIVE
BENZODIAZ UR QL SCN>200 NG/ML: NEGATIVE
BZE UR QL SCN: NEGATIVE
CANNABINOIDS UR QL SCN: NEGATIVE
CREAT UR-MCNC: 38 MG/DL (ref 15–325)
ETHANOL UR-MCNC: <10 MG/DL
METHADONE UR QL SCN>300 NG/ML: NEGATIVE
OPIATES UR QL SCN: NEGATIVE
PCP UR QL SCN>25 NG/ML: NEGATIVE
TOXICOLOGY INFORMATION: NORMAL

## 2023-10-06 PROCEDURE — 3008F BODY MASS INDEX DOCD: CPT | Mod: CPTII,S$GLB,, | Performed by: INTERNAL MEDICINE

## 2023-10-06 PROCEDURE — 3075F SYST BP GE 130 - 139MM HG: CPT | Mod: CPTII,S$GLB,, | Performed by: INTERNAL MEDICINE

## 2023-10-06 PROCEDURE — 99214 OFFICE O/P EST MOD 30 MIN: CPT | Mod: S$GLB,,, | Performed by: INTERNAL MEDICINE

## 2023-10-06 PROCEDURE — 80307 DRUG TEST PRSMV CHEM ANLYZR: CPT | Performed by: INTERNAL MEDICINE

## 2023-10-06 PROCEDURE — 3008F PR BODY MASS INDEX (BMI) DOCUMENTED: ICD-10-PCS | Mod: CPTII,S$GLB,, | Performed by: INTERNAL MEDICINE

## 2023-10-06 PROCEDURE — 3044F PR MOST RECENT HEMOGLOBIN A1C LEVEL <7.0%: ICD-10-PCS | Mod: CPTII,S$GLB,, | Performed by: INTERNAL MEDICINE

## 2023-10-06 PROCEDURE — 3044F HG A1C LEVEL LT 7.0%: CPT | Mod: CPTII,S$GLB,, | Performed by: INTERNAL MEDICINE

## 2023-10-06 PROCEDURE — 1159F MED LIST DOCD IN RCRD: CPT | Mod: CPTII,S$GLB,, | Performed by: INTERNAL MEDICINE

## 2023-10-06 PROCEDURE — 1159F PR MEDICATION LIST DOCUMENTED IN MEDICAL RECORD: ICD-10-PCS | Mod: CPTII,S$GLB,, | Performed by: INTERNAL MEDICINE

## 2023-10-06 PROCEDURE — 99999 PR PBB SHADOW E&M-EST. PATIENT-LVL IV: CPT | Mod: PBBFAC,,, | Performed by: INTERNAL MEDICINE

## 2023-10-06 PROCEDURE — 3079F PR MOST RECENT DIASTOLIC BLOOD PRESSURE 80-89 MM HG: ICD-10-PCS | Mod: CPTII,S$GLB,, | Performed by: INTERNAL MEDICINE

## 2023-10-06 PROCEDURE — 3075F PR MOST RECENT SYSTOLIC BLOOD PRESS GE 130-139MM HG: ICD-10-PCS | Mod: CPTII,S$GLB,, | Performed by: INTERNAL MEDICINE

## 2023-10-06 PROCEDURE — 99214 PR OFFICE/OUTPT VISIT, EST, LEVL IV, 30-39 MIN: ICD-10-PCS | Mod: S$GLB,,, | Performed by: INTERNAL MEDICINE

## 2023-10-06 PROCEDURE — 3079F DIAST BP 80-89 MM HG: CPT | Mod: CPTII,S$GLB,, | Performed by: INTERNAL MEDICINE

## 2023-10-06 PROCEDURE — 99999 PR PBB SHADOW E&M-EST. PATIENT-LVL IV: ICD-10-PCS | Mod: PBBFAC,,, | Performed by: INTERNAL MEDICINE

## 2023-10-06 RX ORDER — IBUPROFEN 800 MG/1
800 TABLET ORAL 3 TIMES DAILY
Qty: 90 TABLET | Refills: 1 | Status: SHIPPED | OUTPATIENT
Start: 2023-10-06

## 2023-10-06 NOTE — PROGRESS NOTES
[facility] Note    Chief Complaint      Chief Complaint   Patient presents with    Follow-up     3 month       History of Present Illness      Indira Yousif is a 53 y.o. female with chronic conditions of allergic rhinosinusitis, chronic low back pain, chronic venous insufficiency who presents today for: follow up chronic conditions.  Chronic low back pain: had a fall at work and exacerbated back pain.  Controlling with percocet and robaxin.   Has plateaued on ozempic and been off for a while.          Past Medical History:  Past Medical History:   Diagnosis Date    Anxiety        Past Surgical History:   has a past surgical history that includes Tubal ligation;  section; Hernia repair; Cyst Removal (Left, 1980); and Hysterectomy.    Family History:  family history includes Diabetes in her mother; Hypertension in her father and mother; Kidney disease in her mother; No Known Problems in her brother, brother, brother, brother, sister, son, and son.     Social History:  Social History     Tobacco Use    Smoking status: Never    Smokeless tobacco: Never   Substance Use Topics    Alcohol use: Yes     Comment: occasionally    Drug use: No       I personally reviewed all past medical, surgical, social and family history.    Review of Systems   Constitutional:  Negative for chills, fever and malaise/fatigue.   Respiratory:  Negative for shortness of breath.    Cardiovascular:  Negative for chest pain.   Gastrointestinal:  Negative for constipation, diarrhea, nausea and vomiting.   Skin:  Negative for rash.   Neurological:  Negative for weakness.   All other systems reviewed and are negative.       Medications:  Outpatient Encounter Medications as of 10/6/2023   Medication Sig Dispense Refill    diclofenac sodium (VOLTAREN) 1 % Gel Apply 2 g topically 4 (four) times daily. 100 g 0    fluticasone propionate (FLONASE) 50 mcg/actuation nasal spray 2 sprays (100 mcg total) by Each Nostril  "route once daily. 16 g 11    ibuprofen (ADVIL,MOTRIN) 800 MG tablet Take 1 tablet (800 mg total) by mouth 3 (three) times daily. 90 tablet 1    naloxone (NARCAN) 4 mg/actuation Spry 4mg by nasal route as needed for opioid overdose; may repeat every 2-3 minutes in alternating nostrils until medical help arrives. Call 911 2 each 11    oxyCODONE-acetaminophen (PERCOCET)  mg per tablet Take 1 tablet by mouth every 4 (four) hours as needed for Pain. 21 tablet 0    pen needle, diabetic (PEN NEEDLE) 32 gauge x 5/32" Ndle Use as directed for semaglutide injection 100 each 1    triamterene-hydrochlorothiazide 37.5-25 mg (MAXZIDE-25) 37.5-25 mg per tablet Take 1 tablet by mouth daily as needed (leg swelling). 30 tablet 11    [DISCONTINUED] ibuprofen (ADVIL,MOTRIN) 800 MG tablet Take 1 tablet (800 mg total) by mouth 3 (three) times daily. 30 tablet 1    [DISCONTINUED] oxyCODONE-acetaminophen (PERCOCET)  mg per tablet Take 1 tablet by mouth every 4 (four) hours as needed for Pain. 21 tablet 0    [DISCONTINUED] semaglutide (OZEMPIC) 0.25 mg or 0.5 mg (2 mg/3 mL) pen injector Inject 0.5 mg into the skin every 7 days. (Patient not taking: Reported on 10/6/2023) 3 mL 3    [DISCONTINUED] semaglutide (OZEMPIC) 1 mg/dose (4 mg/3 mL) Inject 1 mg into the skin every 7 days. 3 mL 2     No facility-administered encounter medications on file as of 10/6/2023.       Allergies:  Review of patient's allergies indicates:  No Known Allergies    Health Maintenance:  Immunization History   Administered Date(s) Administered    COVID-19, MRNA, LN-S, PF (MODERNA FULL 0.5 ML DOSE) 03/16/2021, 04/13/2021    DTP 11/28/1973, 01/31/1974, 03/23/1977, 08/31/1977    Influenza 10/17/2018    Influenza - Quadrivalent 10/19/2016    Influenza - Quadrivalent - PF *Preferred* (6 months and older) 10/02/2017, 10/17/2018    MMR 01/31/1974    OPV 11/28/1973, 01/31/1974, 03/23/1977, 08/31/1977      Health Maintenance   Topic Date Due    TETANUS VACCINE  " "Never done    Shingles Vaccine (1 of 2) Never done    Colorectal Cancer Screening  03/07/2024    Mammogram  04/19/2024    Lipid Panel  04/14/2028    Hepatitis C Screening  Completed        Physical Exam      Vital Signs  Pulse: 80  SpO2: 98 %  BP: 134/80  BP Location: Left arm  Patient Position: Sitting  Pain Score:   7  Pain Loc: Foot  Height and Weight  Height: 5' 4" (162.6 cm)  Weight: 111.6 kg (246 lb 0.5 oz)  BSA (Calculated - sq m): 2.24 sq meters  BMI (Calculated): 42.2  Weight in (lb) to have BMI = 25: 145.3]    Physical Exam  Vitals reviewed.   Constitutional:       Appearance: She is well-developed.   HENT:      Head: Normocephalic and atraumatic.      Right Ear: External ear normal.      Left Ear: External ear normal.   Cardiovascular:      Rate and Rhythm: Normal rate and regular rhythm.      Heart sounds: Normal heart sounds. No murmur heard.  Pulmonary:      Effort: Pulmonary effort is normal.      Breath sounds: Normal breath sounds. No wheezing or rales.   Abdominal:      General: Bowel sounds are normal. There is no distension.      Palpations: Abdomen is soft.      Tenderness: There is no abdominal tenderness.          Laboratory:  CBC:  Recent Labs   Lab 08/11/22 0618 04/14/23  0818   WBC 6.32 5.15   RBC 4.78 4.53   Hemoglobin 12.3 11.6 L   Hematocrit 37.8 37.8   Platelets 255 237   MCV 79 L 83   MCH 25.7 L 25.6 L   MCHC 32.5 30.7 L     CMP:  Recent Labs   Lab 08/11/22 0618 04/14/23  0818   Glucose 105 86   Calcium 8.7 9.3   Albumin 4.1 3.6   Total Protein 7.9 7.4   Sodium 138 139   Potassium 4.2 3.9   CO2 29 25   Chloride 99 105   BUN 9 17   Alkaline Phosphatase 81 59   ALT 12 7 L   AST 19 11   Total Bilirubin 1.3 H 0.7     URINALYSIS:       LIPIDS:  Recent Labs   Lab 08/11/22 0618 04/14/23  0818   TSH 1.050 0.378 L   HDL 81 H 66   Cholesterol 207 H 198   Triglycerides 46 44   LDL Cholesterol 116.8 123.2   HDL/Cholesterol Ratio 39.1 33.3   Non-HDL Cholesterol 126 132   Total Cholesterol/HDL " Ratio 2.6 3.0     TSH:  Recent Labs   Lab 08/11/22  0618 04/14/23  0818   TSH 1.050 0.378 L     A1C:  Recent Labs   Lab 08/11/22  0618 04/14/23  0818   Hemoglobin A1C 5.4 5.3       Assessment/Plan     Indira Yousif is a 53 y.o.female with:    1. Chronic bilateral low back pain without sciatica  - Toxicology screen, urine; Future  2. Encounter for monitoring opioid maintenance therapy  - Toxicology screen, urine; Future  Continue current meds.    3. Morbid obesity    4. Bilateral foot pain  - Ambulatory referral/consult to Podiatry; Future    5. Lumbar strain, subsequent encounter  - ibuprofen (ADVIL,MOTRIN) 800 MG tablet; Take 1 tablet (800 mg total) by mouth 3 (three) times daily.  Dispense: 90 tablet; Refill: 1    6. Contusion of right elbow, subsequent encounter  - ibuprofen (ADVIL,MOTRIN) 800 MG tablet; Take 1 tablet (800 mg total) by mouth 3 (three) times daily.  Dispense: 90 tablet; Refill: 1    7. Strain of right shoulder, subsequent encounter  - ibuprofen (ADVIL,MOTRIN) 800 MG tablet; Take 1 tablet (800 mg total) by mouth 3 (three) times daily.  Dispense: 90 tablet; Refill: 1    8. Right ankle strain, subsequent encounter  - ibuprofen (ADVIL,MOTRIN) 800 MG tablet; Take 1 tablet (800 mg total) by mouth 3 (three) times daily.  Dispense: 90 tablet; Refill: 1    9. Right foot strain, subsequent encounter  - ibuprofen (ADVIL,MOTRIN) 800 MG tablet; Take 1 tablet (800 mg total) by mouth 3 (three) times daily.  Dispense: 90 tablet; Refill: 1       Chronic conditions status updated as per HPI.  Other than changes above, cont current medications and maintain follow up with specialists.  Follow up in about 3 months (around 1/6/2024) for Follow up visit.    Future Appointments   Date Time Provider Department Center   10/20/2023 10:00 AM Kathryn Anand DPM [facility] [address]   1/10/2024  9:45 AM Kendrick Stanley MD [facility]       Kendrick Stanley MD  [facility]

## 2023-10-18 ENCOUNTER — OFFICE VISIT (OUTPATIENT)
Dept: PODIATRY | Facility: CLINIC | Age: 53
End: 2023-10-18
Payer: COMMERCIAL

## 2023-10-18 DIAGNOSIS — Q82.8 POROKERATOSIS: Primary | ICD-10-CM

## 2023-10-18 DIAGNOSIS — L85.3 DRY SKIN: ICD-10-CM

## 2023-10-18 PROCEDURE — 99999 PR PBB SHADOW E&M-EST. PATIENT-LVL II: ICD-10-PCS | Mod: PBBFAC,,, | Performed by: PODIATRIST

## 2023-10-18 PROCEDURE — 1159F MED LIST DOCD IN RCRD: CPT | Mod: CPTII,S$GLB,, | Performed by: PODIATRIST

## 2023-10-18 PROCEDURE — 17110 PR DESTRUCTION BENIGN LESIONS UP TO 14: ICD-10-PCS | Mod: S$GLB,,, | Performed by: PODIATRIST

## 2023-10-18 PROCEDURE — 99999 PR PBB SHADOW E&M-EST. PATIENT-LVL II: CPT | Mod: PBBFAC,,, | Performed by: PODIATRIST

## 2023-10-18 PROCEDURE — 99213 PR OFFICE/OUTPT VISIT, EST, LEVL III, 20-29 MIN: ICD-10-PCS | Mod: 25,S$GLB,, | Performed by: PODIATRIST

## 2023-10-18 PROCEDURE — 3044F HG A1C LEVEL LT 7.0%: CPT | Mod: CPTII,S$GLB,, | Performed by: PODIATRIST

## 2023-10-18 PROCEDURE — 1159F PR MEDICATION LIST DOCUMENTED IN MEDICAL RECORD: ICD-10-PCS | Mod: CPTII,S$GLB,, | Performed by: PODIATRIST

## 2023-10-18 PROCEDURE — 3044F PR MOST RECENT HEMOGLOBIN A1C LEVEL <7.0%: ICD-10-PCS | Mod: CPTII,S$GLB,, | Performed by: PODIATRIST

## 2023-10-18 PROCEDURE — 17110 DESTRUCTION B9 LES UP TO 14: CPT | Mod: S$GLB,,, | Performed by: PODIATRIST

## 2023-10-18 PROCEDURE — 99213 OFFICE O/P EST LOW 20 MIN: CPT | Mod: 25,S$GLB,, | Performed by: PODIATRIST

## 2023-10-18 RX ORDER — AMMONIUM LACTATE 12 G/100G
CREAM TOPICAL
Qty: 140 G | Refills: 6 | Status: SHIPPED | OUTPATIENT
Start: 2023-10-18

## 2023-10-18 NOTE — PROGRESS NOTES
Subjective:      Patient ID: Indira Yousif is a 53 y.o. female.    Chief Complaint: Foot Pain    Indira is a 53 y.o. female who presents to the podiatry clinic  with complaint of  bilateral foot pain. Onset of the symptoms was several months ago. Precipitating event: none known. Current symptoms include: ability to bear weight, but with some pain. Aggravating factors: any weight bearing. Symptoms have gradually worsened. Patient has had no prior foot problems. Evaluation to date: none. Treatment to date: none. Patients rates pain 7/10 on pain scale.    Review of Systems   Constitutional: Negative for chills, fever and malaise/fatigue.   HENT:  Negative for hearing loss.    Cardiovascular:  Negative for claudication.   Respiratory:  Negative for shortness of breath.    Skin:  Positive for color change, dry skin and nail changes. Negative for flushing and rash.   Musculoskeletal:  Negative for joint pain and myalgias.   Neurological:  Negative for loss of balance, numbness, paresthesias and sensory change.   Psychiatric/Behavioral:  Negative for altered mental status.            Objective:      Physical Exam  Vitals reviewed.   Cardiovascular:      Pulses:           Dorsalis pedis pulses are 2+ on the right side and 2+ on the left side.        Posterior tibial pulses are 2+ on the right side and 2+ on the left side.      Comments: No edema noted b/L  Musculoskeletal:      Comments:        Feet:      Right foot:      Protective Sensation: 5 sites tested.  5 sites sensed.      Left foot:      Protective Sensation: 5 sites tested.  5 sites sensed.   Skin:     General: Skin is warm.      Capillary Refill: Capillary refill takes 2 to 3 seconds.      Comments: dry skin tugor noted.   No open lesion noted b/L  Skin temp is warm to warm from proximal to distal b/L.  Webspaces clean, dry, and intact  IPKs noted to b/L plantar foot   Left x6  Right x4   Neurological:      Mental Status: She is alert and oriented to  person, place, and time.      Comments: Gross sensation intact b/L               Assessment:       Encounter Diagnosis   Name Primary?    Porokeratosis Yes         Plan:       Indira was seen today for foot pain.    Diagnoses and all orders for this visit:    Porokeratosis      I counseled the patient on her conditions, their implications and medical management.  Rx marta lact  Pt advised that pain is likely due to porokeratosis.   Porokeratosis is a skin condition that appears in adulthood in the form of many tiny, ridge-like bumps on the palms of the hands and soles of the feet.   Using a #3 curette, the lesion was debrided and core removed without incident. Silver nitrate applied to center.   Pt voiced relief, horse shoe pad placed around lesion(s) to offload.   Call or return to clinic prn if these symptoms worsen or fail to improve as anticipated.      .

## 2023-11-01 DIAGNOSIS — M54.50 CHRONIC BILATERAL LOW BACK PAIN WITHOUT SCIATICA: ICD-10-CM

## 2023-11-01 DIAGNOSIS — G89.29 CHRONIC BILATERAL LOW BACK PAIN WITHOUT SCIATICA: ICD-10-CM

## 2023-11-01 RX ORDER — OXYCODONE AND ACETAMINOPHEN 10; 325 MG/1; MG/1
1 TABLET ORAL EVERY 4 HOURS PRN
Qty: 21 TABLET | Refills: 0 | Status: SHIPPED | OUTPATIENT
Start: 2023-11-01 | End: 2023-11-30 | Stop reason: SDUPTHER

## 2023-11-01 NOTE — TELEPHONE ENCOUNTER
----- Message from Teresa Sofia sent at 11/1/2023  2:21 PM CDT -----  Contact: [phone]  Prescription refill request.  RX name and strength (copy/paste from chart):   oxyCODONE-acetaminophen (PERCOCET)  mg per tablet        Pharmacy name and phone # (copy/paste from chart):     [facility]  [address]  [place] [address]  Phone: [phone] Fax: [phone]        Additional information:    please call to advise

## 2023-11-01 NOTE — TELEPHONE ENCOUNTER
No care due was identified.  [facility] Embedded Care Due Messages. Reference number: [id number].   11/01/2023 2:25:58 PM CDT

## 2023-11-17 ENCOUNTER — TELEPHONE (OUTPATIENT)
Dept: PRIMARY CARE CLINIC | Facility: CLINIC | Age: 53
End: 2023-11-17
Payer: COMMERCIAL

## 2023-11-30 DIAGNOSIS — M54.50 CHRONIC BILATERAL LOW BACK PAIN WITHOUT SCIATICA: ICD-10-CM

## 2023-11-30 DIAGNOSIS — G89.29 CHRONIC BILATERAL LOW BACK PAIN WITHOUT SCIATICA: ICD-10-CM

## 2023-11-30 NOTE — TELEPHONE ENCOUNTER
No care due was identified.  [facility] Embedded Care Due Messages. Reference number: [id number].   11/30/2023 3:09:35 PM CST

## 2023-11-30 NOTE — TELEPHONE ENCOUNTER
----- Message from Miranda Robert sent at 11/30/2023  3:03 PM CST -----  Contact: Self  [phone]  Requesting an RX refill or new RX.    Is this a refill or new RX: refill    RX name and strength (copy/paste from chart):  oxyCODONE-acetaminophen (PERCOCET)  mg per tablet     Is this a 30 day or 90 day RX: 30    Pharmacy name and phone # (copy/paste from chart):    [facility]  [address] [address]  [place] [address]  Phone: [phone] Fax: [phone]

## 2023-12-01 RX ORDER — OXYCODONE AND ACETAMINOPHEN 10; 325 MG/1; MG/1
1 TABLET ORAL EVERY 4 HOURS PRN
Qty: 21 TABLET | Refills: 0 | Status: SHIPPED | OUTPATIENT
Start: 2023-12-01 | End: 2023-12-29 | Stop reason: SDUPTHER

## 2023-12-29 ENCOUNTER — PATIENT MESSAGE (OUTPATIENT)
Dept: PRIMARY CARE CLINIC | Facility: CLINIC | Age: 53
End: 2023-12-29
Payer: COMMERCIAL

## 2023-12-29 DIAGNOSIS — G89.29 CHRONIC BILATERAL LOW BACK PAIN WITHOUT SCIATICA: ICD-10-CM

## 2023-12-29 DIAGNOSIS — M54.50 CHRONIC BILATERAL LOW BACK PAIN WITHOUT SCIATICA: ICD-10-CM

## 2023-12-29 NOTE — TELEPHONE ENCOUNTER
No care due was identified.  [facility] Embedded Care Due Messages. Reference number: [id number].   12/29/2023 8:33:35 AM CST

## 2024-01-02 RX ORDER — OXYCODONE AND ACETAMINOPHEN 10; 325 MG/1; MG/1
1 TABLET ORAL EVERY 4 HOURS PRN
Qty: 21 TABLET | Refills: 0 | Status: SHIPPED | OUTPATIENT
Start: 2024-01-02 | End: 2024-02-02 | Stop reason: SDUPTHER

## 2024-01-10 ENCOUNTER — LAB VISIT (OUTPATIENT)
Dept: LAB | Facility: HOSPITAL | Age: 54
End: 2024-01-10
Attending: INTERNAL MEDICINE
Payer: COMMERCIAL

## 2024-01-10 ENCOUNTER — OFFICE VISIT (OUTPATIENT)
Dept: PRIMARY CARE CLINIC | Facility: CLINIC | Age: 54
End: 2024-01-10
Payer: COMMERCIAL

## 2024-01-10 VITALS
BODY MASS INDEX: 42.72 KG/M2 | DIASTOLIC BLOOD PRESSURE: 90 MMHG | OXYGEN SATURATION: 98 % | HEIGHT: 64 IN | HEART RATE: 77 BPM | WEIGHT: 250.25 LBS | SYSTOLIC BLOOD PRESSURE: 128 MMHG

## 2024-01-10 DIAGNOSIS — F41.9 ANXIETY: ICD-10-CM

## 2024-01-10 DIAGNOSIS — M54.6 CHRONIC LEFT-SIDED THORACIC BACK PAIN: Primary | ICD-10-CM

## 2024-01-10 DIAGNOSIS — G89.29 CHRONIC LEFT-SIDED THORACIC BACK PAIN: ICD-10-CM

## 2024-01-10 DIAGNOSIS — Z79.891 ENCOUNTER FOR MONITORING OPIOID MAINTENANCE THERAPY: ICD-10-CM

## 2024-01-10 DIAGNOSIS — F51.01 PRIMARY INSOMNIA: ICD-10-CM

## 2024-01-10 DIAGNOSIS — Z51.81 ENCOUNTER FOR MONITORING OPIOID MAINTENANCE THERAPY: ICD-10-CM

## 2024-01-10 DIAGNOSIS — Z00.00 ANNUAL PHYSICAL EXAM: ICD-10-CM

## 2024-01-10 DIAGNOSIS — M54.6 CHRONIC LEFT-SIDED THORACIC BACK PAIN: ICD-10-CM

## 2024-01-10 DIAGNOSIS — G89.29 CHRONIC LEFT-SIDED THORACIC BACK PAIN: Primary | ICD-10-CM

## 2024-01-10 LAB
AMPHET+METHAMPHET UR QL: NEGATIVE
BARBITURATES UR QL SCN>200 NG/ML: NEGATIVE
BENZODIAZ UR QL SCN>200 NG/ML: NEGATIVE
BZE UR QL SCN: NEGATIVE
CANNABINOIDS UR QL SCN: NEGATIVE
CREAT UR-MCNC: 132 MG/DL (ref 15–325)
ETHANOL UR-MCNC: <10 MG/DL
METHADONE UR QL SCN>300 NG/ML: NEGATIVE
OPIATES UR QL SCN: NEGATIVE
PCP UR QL SCN>25 NG/ML: NEGATIVE
TOXICOLOGY INFORMATION: NORMAL

## 2024-01-10 PROCEDURE — 3080F DIAST BP >= 90 MM HG: CPT | Mod: CPTII,S$GLB,, | Performed by: INTERNAL MEDICINE

## 2024-01-10 PROCEDURE — 80307 DRUG TEST PRSMV CHEM ANLYZR: CPT | Performed by: INTERNAL MEDICINE

## 2024-01-10 PROCEDURE — 1159F MED LIST DOCD IN RCRD: CPT | Mod: CPTII,S$GLB,, | Performed by: INTERNAL MEDICINE

## 2024-01-10 PROCEDURE — 99999 PR PBB SHADOW E&M-EST. PATIENT-LVL IV: CPT | Mod: PBBFAC,,, | Performed by: INTERNAL MEDICINE

## 2024-01-10 PROCEDURE — 3074F SYST BP LT 130 MM HG: CPT | Mod: CPTII,S$GLB,, | Performed by: INTERNAL MEDICINE

## 2024-01-10 PROCEDURE — 99214 OFFICE O/P EST MOD 30 MIN: CPT | Mod: S$GLB,,, | Performed by: INTERNAL MEDICINE

## 2024-01-10 PROCEDURE — 3008F BODY MASS INDEX DOCD: CPT | Mod: CPTII,S$GLB,, | Performed by: INTERNAL MEDICINE

## 2024-01-10 RX ORDER — ALPRAZOLAM 0.5 MG/1
0.5 TABLET ORAL 3 TIMES DAILY PRN
Qty: 21 TABLET | Refills: 0 | Status: SHIPPED | OUTPATIENT
Start: 2024-01-10 | End: 2024-02-02 | Stop reason: SDUPTHER

## 2024-01-10 RX ORDER — TRAZODONE HYDROCHLORIDE 50 MG/1
50 TABLET ORAL NIGHTLY
Qty: 30 TABLET | Refills: 11 | Status: SHIPPED | OUTPATIENT
Start: 2024-01-10 | End: 2025-01-09

## 2024-01-10 NOTE — PROGRESS NOTES
[facility] Note    Chief Complaint      Chief Complaint   Patient presents with    Follow-up     3 month        History of Present Illness      Indira Yousif is a 53 y.o. female with chronic conditions of allergic rhinosinusitis, chronic low back pain, chronic venous insufficiency  who presents today for: follow up chronic conditions.  Chronic low back pain: Controlling with percocet and robaxin.     Pt reports her  passed away 2 weeks ago.  Has been having significant anxiety and stress handling his arrangements.  Has had insomnia since then.      Past Medical History:  Past Medical History:   Diagnosis Date    Anxiety        Past Surgical History:   has a past surgical history that includes Tubal ligation;  section; Hernia repair; Cyst Removal (Left, 1980); and Hysterectomy.    Family History:  family history includes Diabetes in her mother; Hypertension in her father and mother; Kidney disease in her mother; No Known Problems in her brother, brother, brother, brother, sister, son, and son.     Social History:  Social History     Tobacco Use    Smoking status: Never    Smokeless tobacco: Never   Substance Use Topics    Alcohol use: Yes     Comment: occasionally    Drug use: No       I personally reviewed all past medical, surgical, social and family history.    Review of Systems   Constitutional:  Negative for chills, fever and malaise/fatigue.   Respiratory:  Negative for shortness of breath.    Cardiovascular:  Negative for chest pain.   Gastrointestinal:  Negative for constipation, diarrhea, nausea and vomiting.   Skin:  Negative for rash.   Neurological:  Negative for weakness.   All other systems reviewed and are negative.       Medications:  Outpatient Encounter Medications as of 1/10/2024   Medication Sig Dispense Refill    ammonium lactate 12 % Crea Apply small amount to feet except for in between toes twice a day 140 g 6    diclofenac sodium (VOLTAREN) 1 %  "Gel Apply 2 g topically 4 (four) times daily. 100 g 0    fluticasone propionate (FLONASE) 50 mcg/actuation nasal spray 2 sprays (100 mcg total) by Each Nostril route once daily. 16 g 11    ibuprofen (ADVIL,MOTRIN) 800 MG tablet Take 1 tablet (800 mg total) by mouth 3 (three) times daily. 90 tablet 1    naloxone (NARCAN) 4 mg/actuation Spry 4mg by nasal route as needed for opioid overdose; may repeat every 2-3 minutes in alternating nostrils until medical help arrives. Call 911 2 each 11    oxyCODONE-acetaminophen (PERCOCET)  mg per tablet Take 1 tablet by mouth every 4 (four) hours as needed for Pain. 21 tablet 0    pen needle, diabetic (PEN NEEDLE) 32 gauge x 5/32" Ndle Use as directed for semaglutide injection 100 each 1    triamterene-hydrochlorothiazide 37.5-25 mg (MAXZIDE-25) 37.5-25 mg per tablet Take 1 tablet by mouth daily as needed (leg swelling). 30 tablet 11    [DISCONTINUED] oxyCODONE-acetaminophen (PERCOCET)  mg per tablet Take 1 tablet by mouth every 4 (four) hours as needed for Pain. 21 tablet 0     No facility-administered encounter medications on file as of 1/10/2024.       Allergies:  Review of patient's allergies indicates:  No Known Allergies    Health Maintenance:  Immunization History   Administered Date(s) Administered    COVID-19, MRNA, LN-S, PF (MODERNA FULL 0.5 ML DOSE) 03/16/2021, 04/13/2021    DTP 11/28/1973, 01/31/1974, 03/23/1977, 08/31/1977    Influenza 10/17/2018    Influenza - Quadrivalent 10/19/2016    Influenza - Quadrivalent - PF *Preferred* (6 months and older) 10/02/2017, 10/17/2018    MMR 01/31/1974    OPV 11/28/1973, 01/31/1974, 03/23/1977, 08/31/1977      Health Maintenance   Topic Date Due    TETANUS VACCINE  Never done    Shingles Vaccine (1 of 2) Never done    Colorectal Cancer Screening  03/07/2024    Mammogram  04/19/2024    Lipid Panel  04/14/2028    Hepatitis C Screening  Completed        Physical Exam      Vital Signs  Pulse: 77  SpO2: 98 %  BP: (!) " "128/90  BP Location: Right arm  Patient Position: Sitting  Pain Score:   6  Height and Weight  Height: 5' 4" (162.6 cm)  Weight: 113.5 kg (250 lb 3.6 oz)  BSA (Calculated - sq m): 2.26 sq meters  BMI (Calculated): 42.9  Weight in (lb) to have BMI = 25: 145.3]    Physical Exam  Vitals reviewed.   Constitutional:       Appearance: She is well-developed.   HENT:      Head: Normocephalic and atraumatic.      Right Ear: External ear normal.      Left Ear: External ear normal.   Cardiovascular:      Rate and Rhythm: Normal rate and regular rhythm.      Heart sounds: Normal heart sounds. No murmur heard.  Pulmonary:      Effort: Pulmonary effort is normal.      Breath sounds: Normal breath sounds. No wheezing or rales.   Abdominal:      General: Bowel sounds are normal. There is no distension.      Palpations: Abdomen is soft.      Tenderness: There is no abdominal tenderness.          Laboratory:  CBC:  Recent Labs   Lab 08/11/22 0618 04/14/23  0818   WBC 6.32 5.15   RBC 4.78 4.53   Hemoglobin 12.3 11.6 L   Hematocrit 37.8 37.8   Platelets 255 237   MCV 79 L 83   MCH 25.7 L 25.6 L   MCHC 32.5 30.7 L     CMP:  Recent Labs   Lab 08/11/22 0618 04/14/23  0818   Glucose 105 86   Calcium 8.7 9.3   Albumin 4.1 3.6   Total Protein 7.9 7.4   Sodium 138 139   Potassium 4.2 3.9   CO2 29 25   Chloride 99 105   BUN 9 17   Alkaline Phosphatase 81 59   ALT 12 7 L   AST 19 11   Total Bilirubin 1.3 H 0.7     URINALYSIS:       LIPIDS:  Recent Labs   Lab 08/11/22 0618 04/14/23  0818   TSH 1.050 0.378 L   HDL 81 H 66   Cholesterol 207 H 198   Triglycerides 46 44   LDL Cholesterol 116.8 123.2   HDL/Cholesterol Ratio 39.1 33.3   Non-HDL Cholesterol 126 132   Total Cholesterol/HDL Ratio 2.6 3.0     TSH:  Recent Labs   Lab 08/11/22 0618 04/14/23  0818   TSH 1.050 0.378 L     A1C:  Recent Labs   Lab 08/11/22 0618 04/14/23  0818   Hemoglobin A1C 5.4 5.3       Assessment/Plan     Indira Yousif is a 53 y.o.female with:    1. Chronic " left-sided thoracic back pain  - Toxicology screen, urine; Future  Continue current meds.    2. Encounter for monitoring opioid maintenance therapy  - Toxicology screen, urine; Future  - Toxicology screen, urine; Future    3. Annual physical exam  - CBC Auto Differential; Future  - Comprehensive Metabolic Panel; Future  - Lipid Panel; Future  - TSH; Future  - Hemoglobin A1C; Future  - Toxicology screen, urine; Future    4. Primary insomnia  5. Anxiety  Will give short course of xanax for anxiety.  Start on trazodone for sleep.       Chronic conditions status updated as per HPI.  Other than changes above, cont current medications and maintain follow up with specialists.  Follow up in about 3 months (around 4/10/2024) for Annual preventative visit.    No future appointments.    Kendrick Stanley MD  [facility]

## 2024-01-19 ENCOUNTER — OFFICE VISIT (OUTPATIENT)
Dept: PRIMARY CARE CLINIC | Facility: CLINIC | Age: 54
End: 2024-01-19
Payer: COMMERCIAL

## 2024-01-19 DIAGNOSIS — J01.10 ACUTE NON-RECURRENT FRONTAL SINUSITIS: Primary | ICD-10-CM

## 2024-01-19 PROCEDURE — 99214 OFFICE O/P EST MOD 30 MIN: CPT | Mod: 95,,, | Performed by: NURSE PRACTITIONER

## 2024-01-19 PROCEDURE — 1160F RVW MEDS BY RX/DR IN RCRD: CPT | Mod: CPTII,95,, | Performed by: NURSE PRACTITIONER

## 2024-01-19 PROCEDURE — 1159F MED LIST DOCD IN RCRD: CPT | Mod: CPTII,95,, | Performed by: NURSE PRACTITIONER

## 2024-01-19 RX ORDER — CETIRIZINE HYDROCHLORIDE 10 MG/1
10 TABLET ORAL 2 TIMES DAILY
Qty: 14 TABLET | Refills: 0 | Status: SHIPPED | OUTPATIENT
Start: 2024-01-19 | End: 2024-04-01 | Stop reason: SDUPTHER

## 2024-01-19 RX ORDER — AZITHROMYCIN 250 MG/1
TABLET, FILM COATED ORAL
Qty: 6 TABLET | Refills: 0 | Status: SHIPPED | OUTPATIENT
Start: 2024-01-19 | End: 2024-01-24

## 2024-01-19 RX ORDER — METHYLPREDNISOLONE 4 MG/1
TABLET ORAL
Qty: 21 EACH | Refills: 0 | Status: SHIPPED | OUTPATIENT
Start: 2024-01-19 | End: 2024-02-09

## 2024-01-19 NOTE — PROGRESS NOTES
The patient location is: [place]   The chief complaint leading to consultation is: sinus infection    Visit type: audiovisual    Face to Face time with patient: 15  26 minutes of total time spent on the encounter, which includes face to face time and non-face to face time preparing to see the patient (eg, review of tests), Obtaining and/or reviewing separately obtained history, Documenting clinical information in the electronic or other health record, Independently interpreting results (not separately reported) and communicating results to the patient/family/caregiver, or Care coordination (not separately reported).         Each patient to whom he or she provides medical services by telemedicine is:  (1) informed of the relationship between the physician and patient and the respective role of any other health care provider with respect to management of the patient; and (2) notified that he or she may decline to receive medical services by telemedicine and may withdraw from such care at any time.    Notes:   Subjective     Patient ID: Indira Yousif is a 53 y.o. female.    Chief Complaint: No chief complaint on file.    Sore Throat   This is a chronic problem. The current episode started in the past 7 days. The problem has been resolved. Neither side of throat is experiencing more pain than the other. Associated symptoms include congestion and coughing. Pertinent negatives include no abdominal pain, diarrhea, drooling, ear discharge, ear pain, headaches, hoarse voice, plugged ear sensation, neck pain, shortness of breath, stridor, swollen glands, trouble swallowing or vomiting. She has had no exposure to strep or mono.       This is a 53-year-old female patient of Dr. Stanley's who is new to me and presents via video for complaints of head congestion, postnasal drip, rhinorrhea, sinus pressure and a slight cough due to thick mucus production over the past 6 days.  Patient reports her nose and her lips are chapped  from so much wiping.  Patient denies fever chills shortness of breath nausea vomiting or diarrhea.    She reports her  just passed last week, and she has been taking care of her mother-in-law that is at her house at this time and not taking care of herself.    It looks like we prescribed Xanax as needed and trazodone for her acute insomnia due to adjustment  We discussed how to take these meds and not take them around the same time.  Patient understands instructions.  Patient and pretty good spirits even though of her recent traumatic situation    Review of Systems   Constitutional:  Positive for fatigue. Negative for chills and fever.   HENT:  Positive for nasal congestion, postnasal drip, rhinorrhea, sinus pressure/congestion and sore throat. Negative for drooling, ear discharge, ear pain, hoarse voice and trouble swallowing.    Respiratory:  Positive for cough. Negative for chest tightness, shortness of breath and stridor.    Cardiovascular:  Negative for chest pain.   Gastrointestinal:  Negative for abdominal pain, diarrhea, nausea and vomiting.   Musculoskeletal:  Negative for neck pain.   Neurological:  Negative for dizziness, light-headedness and headaches.          Objective     Physical Exam  Constitutional:       General: She is not in acute distress.     Appearance: Normal appearance. She is ill-appearing.   HENT:      Head: Normocephalic and atraumatic.      Comments: Obvious head congestion, rhinorrhea and postnasal drip     Nose:      Comments: Redness around patient's nose noted due to blowing her nose  Cardiovascular:      Comments: No signs and symptoms of distress noted  Pulmonary:      Effort: Pulmonary effort is normal. No respiratory distress.   Neurological:      Mental Status: She is alert and oriented to person, place, and time.   Psychiatric:         Mood and Affect: Mood normal.         Behavior: Behavior normal.         Thought Content: Thought content normal.         Judgment:  Judgment normal.            Assessment and Plan     1. Acute non-recurrent frontal sinusitis  -     azithromycin (Z-CLOVER) 250 MG tablet; Take 2 tablets by mouth on day 1; Take 1 tablet by mouth on days 2-5  Dispense: 6 tablet; Refill: 0  -     methylPREDNISolone (MEDROL DOSEPACK) 4 mg tablet; use as directed  Dispense: 21 each; Refill: 0  -     cetirizine (ZYRTEC) 10 MG tablet; Take 1 tablet (10 mg total) by mouth 2 (two) times a day. for 7 days  Dispense: 14 tablet; Refill: 0        Continue to stay super hydrated with water and warm tea to keep mucus thin  Monitor symptoms  Complete antibiotics  Take meds as prescribed    Follow-up for any concerns         No follow-ups on file.

## 2024-02-02 DIAGNOSIS — J01.10 ACUTE NON-RECURRENT FRONTAL SINUSITIS: ICD-10-CM

## 2024-02-02 DIAGNOSIS — G89.29 CHRONIC BILATERAL LOW BACK PAIN WITHOUT SCIATICA: ICD-10-CM

## 2024-02-02 DIAGNOSIS — F41.9 ANXIETY: ICD-10-CM

## 2024-02-02 DIAGNOSIS — M54.50 CHRONIC BILATERAL LOW BACK PAIN WITHOUT SCIATICA: ICD-10-CM

## 2024-02-02 RX ORDER — AZITHROMYCIN 250 MG/1
TABLET, FILM COATED ORAL
Qty: 6 TABLET | Refills: 0 | OUTPATIENT
Start: 2024-02-02 | End: 2024-02-07

## 2024-02-02 RX ORDER — CETIRIZINE HYDROCHLORIDE 10 MG/1
10 TABLET ORAL 2 TIMES DAILY
Qty: 14 TABLET | Refills: 0 | OUTPATIENT
Start: 2024-02-02 | End: 2024-02-09

## 2024-02-02 RX ORDER — OXYCODONE AND ACETAMINOPHEN 10; 325 MG/1; MG/1
1 TABLET ORAL EVERY 4 HOURS PRN
Qty: 21 TABLET | Refills: 0 | Status: SHIPPED | OUTPATIENT
Start: 2024-02-02 | End: 2024-03-04 | Stop reason: SDUPTHER

## 2024-02-02 RX ORDER — METHYLPREDNISOLONE 4 MG/1
TABLET ORAL
Qty: 21 EACH | Refills: 0 | OUTPATIENT
Start: 2024-02-02 | End: 2024-02-23

## 2024-02-02 RX ORDER — ALPRAZOLAM 0.5 MG/1
0.5 TABLET ORAL 3 TIMES DAILY PRN
Qty: 21 TABLET | Refills: 0 | Status: SHIPPED | OUTPATIENT
Start: 2024-02-02 | End: 2024-03-04 | Stop reason: SDUPTHER

## 2024-02-02 NOTE — TELEPHONE ENCOUNTER
Care Due:                  Date            Visit Type   Department     Provider  --------------------------------------------------------------------------------                                EP -                              PRIMARY      [facility]  Last Visit: 01-      CARE ([facility])   CARE           Kendrick Meraz                              EP -                              PRIMARY      [facility]  Next Visit: 04-      CARE ([facility])   CARE           Kendrick Meraz                                                            Last  Test          Frequency    Reason                     Performed    Due Date  --------------------------------------------------------------------------------    CBC.........  12 months..  ibuprofen................  04- 04-    CMP.........  12 months..  ibuprofen,                 04- 04-                             triamterene-hydrochloroth                             iazide...................    [employer] Embedded Care Due Messages. Reference number: [id number].   2/02/2024 11:12:55 AM CST

## 2024-02-02 NOTE — TELEPHONE ENCOUNTER
----- Message from Senait Lovett sent at 2/2/2024 11:07 AM CST -----  Contact: [phone]  Requesting an RX refill or new RX.  Is this a refill or new RX: Refill 1  RX name and strength (copy/paste from chart):  oxyCODONE-acetaminophen (PERCOCET)  mg per tablet  Is this a 30 day or 90 day RX:   Pharmacy name and phone # (copy/paste from chart):  [facility]   Phone: [phone]  Fax: [phone]      Requesting an RX refill or new RX.  Is this a refill or new RX: Refill 2  RX name and strength (copy/paste from chart):  ALPRAZolam (XANAX) 0.5 MG tablet  Is this a 30 day or 90 day RX:   Pharmacy name and phone # (copy/paste from chart):  [facility]   Phone: [phone]  Fax: [phone]        The doctors have asked that we provide their patients with the following 2 reminders -- prescription refills can take up to 72 hours, and a friendly reminder that in the future you can use your [facility] account to request refills:

## 2024-03-04 DIAGNOSIS — F51.01 PRIMARY INSOMNIA: ICD-10-CM

## 2024-03-04 DIAGNOSIS — G89.29 CHRONIC BILATERAL LOW BACK PAIN WITHOUT SCIATICA: ICD-10-CM

## 2024-03-04 DIAGNOSIS — F41.9 ANXIETY: ICD-10-CM

## 2024-03-04 DIAGNOSIS — M54.50 CHRONIC BILATERAL LOW BACK PAIN WITHOUT SCIATICA: ICD-10-CM

## 2024-03-04 RX ORDER — ALPRAZOLAM 0.5 MG/1
0.5 TABLET ORAL 3 TIMES DAILY PRN
Qty: 21 TABLET | Refills: 0 | Status: SHIPPED | OUTPATIENT
Start: 2024-03-04 | End: 2024-04-01 | Stop reason: SDUPTHER

## 2024-03-04 RX ORDER — OXYCODONE AND ACETAMINOPHEN 10; 325 MG/1; MG/1
1 TABLET ORAL EVERY 4 HOURS PRN
Qty: 21 TABLET | Refills: 0 | Status: SHIPPED | OUTPATIENT
Start: 2024-03-04 | End: 2024-04-01 | Stop reason: SDUPTHER

## 2024-03-04 RX ORDER — OXYCODONE AND ACETAMINOPHEN 10; 325 MG/1; MG/1
1 TABLET ORAL EVERY 4 HOURS PRN
Qty: 21 TABLET | Refills: 0 | OUTPATIENT
Start: 2024-03-04

## 2024-03-04 RX ORDER — TRAZODONE HYDROCHLORIDE 50 MG/1
50 TABLET ORAL NIGHTLY
Qty: 30 TABLET | Refills: 11 | Status: SHIPPED | OUTPATIENT
Start: 2024-03-04 | End: 2025-03-04

## 2024-03-04 RX ORDER — ALPRAZOLAM 0.5 MG/1
0.5 TABLET ORAL 3 TIMES DAILY PRN
Qty: 21 TABLET | Refills: 0 | OUTPATIENT
Start: 2024-03-04 | End: 2024-04-03

## 2024-03-04 NOTE — TELEPHONE ENCOUNTER
----- Message from Christa Dorsey sent at 3/4/2024  8:10 AM CST -----  Contact: [phone] Patient  Requesting an RX refill or new RX.  Is this a refill or new RX: new  RX name and strength (copy/paste from chart):  oxyCODONE-acetaminophen (PERCOCET)  mg per tablet  Is this a 30 day or 90 day RX:   Pharmacy name and phone # (copy/paste from chart):    [facility]  [address]  [place] [address]  Phone: [phone] Fax: [phone]      Requesting an RX refill or new RX.  Is this a refill or new RX: new  RX name and strength (copy/paste from chart):  ALPRAZolam (XANAX) 0.5 MG tablet  Is this a 30 day or 90 day RX:   Pharmacy name and phone # (copy/paste from chart):    [facility]  [address]  [place] [address]  Phone: [phone] Fax: [phone]      Requesting an RX refill or new RX.  Is this a refill or new RX: new  RX name and strength (copy/paste from chart):  traZODone (DESYREL) 50 MG tablet  Is this a 30 day or 90 day RX:   Pharmacy name and phone # (copy/paste from chart):    [facility]  [address]  [place] [place] [address]  Phone: [phone] Fax: [phone]

## 2024-03-04 NOTE — TELEPHONE ENCOUNTER
No care due was identified.  [facility] Embedded Care Due Messages. Reference number: [id number].   3/04/2024 11:37:06 AM CST

## 2024-03-04 NOTE — TELEPHONE ENCOUNTER
No care due was identified.  [facility] Embedded Care Due Messages. Reference number: [id number].   3/04/2024 8:13:59 AM CST

## 2024-04-01 DIAGNOSIS — M54.50 CHRONIC BILATERAL LOW BACK PAIN WITHOUT SCIATICA: ICD-10-CM

## 2024-04-01 DIAGNOSIS — G89.29 CHRONIC BILATERAL LOW BACK PAIN WITHOUT SCIATICA: ICD-10-CM

## 2024-04-01 DIAGNOSIS — J01.10 ACUTE NON-RECURRENT FRONTAL SINUSITIS: ICD-10-CM

## 2024-04-01 DIAGNOSIS — F41.9 ANXIETY: ICD-10-CM

## 2024-04-01 NOTE — TELEPHONE ENCOUNTER
----- Message from Phyllis Duarteux sent at 4/1/2024 12:59 PM CDT -----  Contact: [phone]  Patient  Requesting an RX refill or new RX.  Is this a refill or new RX: new  RX name and strength (copy/paste from chart):  oxyCODONE-acetaminophen (PERCOCET)  mg per tablet       Is this a 30 day or 90 day RX:   Pharmacy name and phone # (copy/paste from chart):    [facility]  [address] [facility] [address]  [place] [address]  Phone: [phone] Fax: [phone]      Requesting an RX refill or new RX.  Is this a refill or new RX: new  RX name and strength (copy/paste from chart):  cetirizine (ZYRTEC) 10 MG tablet  Is this a 30 day or 90 day RX:   Pharmacy name and phone # (copy/paste from chart):  [facility]  [address]  [place] [address]  Phone: [phone] Fax: [phone]      Requesting an RX refill or new RX.  Is this a refill or new RX: new  RX name and strength (copy/paste from chart):  ALPRAZolam (XANAX) 0.5 MG tablet  Is this a 30 day or 90 day RX:   Pharmacy name and phone # (copy/paste from chart):  [facility]  [address]  [place] [address]  Phone: [phone] Fax: [phone]    The doctors have asked that we provide their patients with the following 2 reminders -- prescription refills can take up to 72 hours, and a friendly reminder that in the future you can use your [facility] account to request refills:  yes

## 2024-04-01 NOTE — TELEPHONE ENCOUNTER
No care due was identified.  [facility] Embedded Care Due Messages. Reference number: [id number].   4/01/2024 1:06:04 PM CDT

## 2024-04-02 RX ORDER — ALPRAZOLAM 0.5 MG/1
0.5 TABLET ORAL 3 TIMES DAILY PRN
Qty: 21 TABLET | Refills: 0 | Status: SHIPPED | OUTPATIENT
Start: 2024-04-02 | End: 2024-05-02

## 2024-04-02 RX ORDER — CETIRIZINE HYDROCHLORIDE 10 MG/1
10 TABLET ORAL 2 TIMES DAILY
Qty: 14 TABLET | Refills: 0 | Status: SHIPPED | OUTPATIENT
Start: 2024-04-02 | End: 2024-04-09

## 2024-04-02 RX ORDER — OXYCODONE AND ACETAMINOPHEN 10; 325 MG/1; MG/1
1 TABLET ORAL EVERY 4 HOURS PRN
Qty: 21 TABLET | Refills: 0 | Status: SHIPPED | OUTPATIENT
Start: 2024-04-02 | End: 2024-05-02 | Stop reason: SDUPTHER

## 2024-04-04 DIAGNOSIS — I87.2 CHRONIC VENOUS INSUFFICIENCY: ICD-10-CM

## 2024-04-04 NOTE — TELEPHONE ENCOUNTER
Care Due:                  Date            Visit Type   Department     Provider  --------------------------------------------------------------------------------                                EP -                              PRIMARY      [facility]  Last Visit: 01-      CARE ([facility])   CARE           Kendrick Meraz                              EP -                              PRIMARY      [facility]  Next Visit: 04-      CARE ([facility])   CARE           Kendrick Meraz                                                            Last  Test          Frequency    Reason                     Performed    Due Date  --------------------------------------------------------------------------------    CBC.........  12 months..  ibuprofen................  04- 04-    CMP.........  12 months..  ibuprofen,                 04- 04-                             triamterene-hydrochloroth                             iazide...................    [employer] Embedded Care Due Messages. Reference number: [id number].   4/04/2024 6:18:40 PM CDT

## 2024-04-05 NOTE — TELEPHONE ENCOUNTER
Refill Routing Note   Medication(s) are not appropriate for processing by [facility] for the following reason(s):        Outside of protocol  Required vitals abnormal    [facility] action(s):  Route   Requires labs : Yes        Medication Therapy Plan: PRN usage outside of [facility] protocol      Appointments  past 12m or future 3m with PCP    Date Provider   Last Visit   1/10/2024 Kendrick Stanley MD   Next Visit   4/10/2024 Kendrick Stanley MD   ED visits in past 90 days: 0        Note composed:4:18 PM 04/05/2024

## 2024-04-08 RX ORDER — TRIAMTERENE/HYDROCHLOROTHIAZID 37.5-25 MG
1 TABLET ORAL DAILY PRN
Qty: 90 TABLET | Refills: 3 | Status: SHIPPED | OUTPATIENT
Start: 2024-04-08

## 2024-04-10 ENCOUNTER — OFFICE VISIT (OUTPATIENT)
Dept: PRIMARY CARE CLINIC | Facility: CLINIC | Age: 54
End: 2024-04-10
Payer: COMMERCIAL

## 2024-04-10 ENCOUNTER — LAB VISIT (OUTPATIENT)
Dept: LAB | Facility: HOSPITAL | Age: 54
End: 2024-04-10
Attending: INTERNAL MEDICINE
Payer: COMMERCIAL

## 2024-04-10 VITALS
SYSTOLIC BLOOD PRESSURE: 134 MMHG | HEIGHT: 64 IN | BODY MASS INDEX: 43.77 KG/M2 | DIASTOLIC BLOOD PRESSURE: 88 MMHG | HEART RATE: 75 BPM | WEIGHT: 256.38 LBS | OXYGEN SATURATION: 98 %

## 2024-04-10 DIAGNOSIS — M54.6 CHRONIC LEFT-SIDED THORACIC BACK PAIN: Primary | ICD-10-CM

## 2024-04-10 DIAGNOSIS — Z00.00 ANNUAL PHYSICAL EXAM: ICD-10-CM

## 2024-04-10 DIAGNOSIS — Z79.891 ENCOUNTER FOR MONITORING OPIOID MAINTENANCE THERAPY: ICD-10-CM

## 2024-04-10 DIAGNOSIS — Z51.81 ENCOUNTER FOR MONITORING OPIOID MAINTENANCE THERAPY: ICD-10-CM

## 2024-04-10 DIAGNOSIS — G89.29 CHRONIC LEFT-SIDED THORACIC BACK PAIN: Primary | ICD-10-CM

## 2024-04-10 LAB
AMPHET+METHAMPHET UR QL: NEGATIVE
BARBITURATES UR QL SCN>200 NG/ML: NEGATIVE
BENZODIAZ UR QL SCN>200 NG/ML: NEGATIVE
BZE UR QL SCN: NEGATIVE
CANNABINOIDS UR QL SCN: NEGATIVE
CREAT UR-MCNC: 39 MG/DL (ref 15–325)
ETHANOL UR-MCNC: <10 MG/DL
METHADONE UR QL SCN>300 NG/ML: NEGATIVE
OPIATES UR QL SCN: NEGATIVE
PCP UR QL SCN>25 NG/ML: NEGATIVE
TOXICOLOGY INFORMATION: NORMAL

## 2024-04-10 PROCEDURE — 99396 PREV VISIT EST AGE 40-64: CPT | Mod: S$GLB,,, | Performed by: INTERNAL MEDICINE

## 2024-04-10 PROCEDURE — 3079F DIAST BP 80-89 MM HG: CPT | Mod: CPTII,S$GLB,, | Performed by: INTERNAL MEDICINE

## 2024-04-10 PROCEDURE — 80307 DRUG TEST PRSMV CHEM ANLYZR: CPT | Performed by: INTERNAL MEDICINE

## 2024-04-10 PROCEDURE — 3008F BODY MASS INDEX DOCD: CPT | Mod: CPTII,S$GLB,, | Performed by: INTERNAL MEDICINE

## 2024-04-10 PROCEDURE — 3075F SYST BP GE 130 - 139MM HG: CPT | Mod: CPTII,S$GLB,, | Performed by: INTERNAL MEDICINE

## 2024-04-10 PROCEDURE — 1159F MED LIST DOCD IN RCRD: CPT | Mod: CPTII,S$GLB,, | Performed by: INTERNAL MEDICINE

## 2024-04-10 PROCEDURE — 99999 PR PBB SHADOW E&M-EST. PATIENT-LVL IV: CPT | Mod: PBBFAC,,, | Performed by: INTERNAL MEDICINE

## 2024-04-10 NOTE — PROGRESS NOTES
[facility] Note    Chief Complaint      Chief Complaint   Patient presents with    Annual Exam       History of Present Illness      Indira Yousif is a 53 y.o. female with chronic conditions of allergic rhinosinusitis, chronic low back pain, chronic venous insufficiency  who presents today for: annual preventative visit.  Still struggling with the sudden death of her .   Chronic low back pain: Controlling with percocet and robaxin. Reports current regimen is not lasting long enough.  Discussed the use of chronic opiates and building dependence/tolerance.  Referring to pain management to evaluate for other strategies for pain control.      Past Medical History:  Past Medical History:   Diagnosis Date    Anxiety        Past Surgical History:   has a past surgical history that includes Tubal ligation;  section; Hernia repair; Cyst Removal (Left, 1980); and Hysterectomy.    Family History:  family history includes Diabetes in her mother; Hypertension in her father and mother; Kidney disease in her mother; No Known Problems in her brother, brother, brother, brother, sister, son, and son.     Social History:  Social History     Tobacco Use    Smoking status: Never    Smokeless tobacco: Never   Substance Use Topics    Alcohol use: Yes     Comment: occasionally    Drug use: No       I personally reviewed all past medical, surgical, social and family history.    Review of Systems   Constitutional:  Negative for chills, fever and malaise/fatigue.   Respiratory:  Negative for shortness of breath.    Cardiovascular:  Negative for chest pain.   Gastrointestinal:  Negative for constipation, diarrhea, nausea and vomiting.   Skin:  Negative for rash.   Neurological:  Negative for weakness.   All other systems reviewed and are negative.       Medications:  Outpatient Encounter Medications as of 4/10/2024   Medication Sig Dispense Refill    ALPRAZolam (XANAX) 0.5 MG tablet Take 1 tablet  "(0.5 mg total) by mouth 3 (three) times daily as needed for Anxiety. 21 tablet 0    ammonium lactate 12 % Crea Apply small amount to feet except for in between toes twice a day 140 g 6    cetirizine (ZYRTEC) 10 MG tablet Take 1 tablet (10 mg total) by mouth 2 (two) times a day. for 7 days 14 tablet 0    diclofenac sodium (VOLTAREN) 1 % Gel Apply 2 g topically 4 (four) times daily. 100 g 0    ibuprofen (ADVIL,MOTRIN) 800 MG tablet Take 1 tablet (800 mg total) by mouth 3 (three) times daily. 90 tablet 1    naloxone (NARCAN) 4 mg/actuation Spry 4mg by nasal route as needed for opioid overdose; may repeat every 2-3 minutes in alternating nostrils until medical help arrives. Call 911 2 each 11    oxyCODONE-acetaminophen (PERCOCET)  mg per tablet Take 1 tablet by mouth every 4 (four) hours as needed for Pain. 21 tablet 0    pen needle, diabetic (PEN NEEDLE) 32 gauge x 5/32" Ndle Use as directed for semaglutide injection 100 each 1    traZODone (DESYREL) 50 MG tablet Take 1 tablet (50 mg total) by mouth every evening. 30 tablet 11    triamterene-hydrochlorothiazide 37.5-25 mg (MAXZIDE-25) 37.5-25 mg per tablet Take 1 tablet by mouth daily as needed (leg swelling). 90 tablet 3    [DISCONTINUED] ALPRAZolam (XANAX) 0.5 MG tablet Take 1 tablet (0.5 mg total) by mouth 3 (three) times daily as needed for Anxiety. 21 tablet 0    [DISCONTINUED] cetirizine (ZYRTEC) 10 MG tablet Take 1 tablet (10 mg total) by mouth 2 (two) times a day. for 7 days 14 tablet 0    [DISCONTINUED] oxyCODONE-acetaminophen (PERCOCET)  mg per tablet Take 1 tablet by mouth every 4 (four) hours as needed for Pain. 21 tablet 0    [DISCONTINUED] triamterene-hydrochlorothiazide 37.5-25 mg (MAXZIDE-25) 37.5-25 mg per tablet Take 1 tablet by mouth daily as needed (leg swelling). 30 tablet 11     No facility-administered encounter medications on file as of 4/10/2024.       Allergies:  Review of patient's allergies indicates:  No Known " "Allergies    Health Maintenance:  Immunization History   Administered Date(s) Administered    COVID-19, MRNA, LN-S, PF (MODERNA FULL 0.5 ML DOSE) 03/16/2021, 04/13/2021    DTP 11/28/1973, 01/31/1974, 03/23/1977, 08/31/1977    Influenza 10/17/2018    Influenza - Quadrivalent 10/19/2016    Influenza - Quadrivalent - PF *Preferred* (6 months and older) 10/02/2017, 10/17/2018    MMR 01/31/1974    OPV 11/28/1973, 01/31/1974, 03/23/1977, 08/31/1977      Health Maintenance   Topic Date Due    TETANUS VACCINE  Never done    Shingles Vaccine (1 of 2) Never done    Colorectal Cancer Screening  03/07/2024    Mammogram  04/19/2024    Lipid Panel  04/14/2028    Hepatitis C Screening  Completed        Physical Exam      Vital Signs  Pulse: 75  SpO2: 98 %  BP: 134/88  BP Location: Left arm  Patient Position: Sitting  Pain Score:   5  Height and Weight  Height: 5' 4" (162.6 cm)  Weight: 116.3 kg (256 lb 6.3 oz)  BSA (Calculated - sq m): 2.29 sq meters  BMI (Calculated): 44  Weight in (lb) to have BMI = 25: 145.3]    Physical Exam  Vitals reviewed.   Constitutional:       Appearance: She is well-developed.   HENT:      Head: Normocephalic and atraumatic.      Right Ear: External ear normal.      Left Ear: External ear normal.   Cardiovascular:      Rate and Rhythm: Normal rate and regular rhythm.      Heart sounds: Normal heart sounds. No murmur heard.  Pulmonary:      Effort: Pulmonary effort is normal.      Breath sounds: Normal breath sounds. No wheezing or rales.   Abdominal:      General: Bowel sounds are normal. There is no distension.      Palpations: Abdomen is soft.      Tenderness: There is no abdominal tenderness.          Laboratory:  CBC:  Recent Labs   Lab 08/11/22  0618 04/14/23  0818   WBC 6.32 5.15   RBC 4.78 4.53   Hemoglobin 12.3 11.6 L   Hematocrit 37.8 37.8   Platelets 255 237   MCV 79 L 83   MCH 25.7 L 25.6 L   MCHC 32.5 30.7 L     CMP:  Recent Labs   Lab 08/11/22  0618 04/14/23  0818   Glucose 105 86   Calcium " 8.7 9.3   Albumin 4.1 3.6   Total Protein 7.9 7.4   Sodium 138 139   Potassium 4.2 3.9   CO2 29 25   Chloride 99 105   BUN 9 17   Alkaline Phosphatase 81 59   ALT 12 7 L   AST 19 11   Total Bilirubin 1.3 H 0.7     URINALYSIS:       LIPIDS:  Recent Labs   Lab 08/11/22  0618 04/14/23  0818   TSH 1.050 0.378 L   HDL 81 H 66   Cholesterol 207 H 198   Triglycerides 46 44   LDL Cholesterol 116.8 123.2   HDL/Cholesterol Ratio 39.1 33.3   Non-HDL Cholesterol 126 132   Total Cholesterol/HDL Ratio 2.6 3.0     TSH:  Recent Labs   Lab 08/11/22  0618 04/14/23  0818   TSH 1.050 0.378 L     A1C:  Recent Labs   Lab 08/11/22  0618 04/14/23  0818   Hemoglobin A1C 5.4 5.3       Assessment/Plan     Indira Yousif is a 53 y.o.female with:    Annual preventative visit  Discussed diet and exercise, vaccines and cancer screening, risk factors.  Screening labs ordered.       1. Chronic left-sided thoracic back pain  - Ambulatory referral/consult to Pain Clinic; Future  - Toxicology screen, urine; Future  2. Encounter for monitoring opioid maintenance therapy  - Toxicology screen, urine; Future  Referring to pain management     Chronic conditions status updated as per HPI.  Other than changes above, cont current medications and maintain follow up with specialists.  Follow up in about 3 months (around 7/10/2024) for Follow up visit.    Future Appointments   Date Time Provider Department Center   7/10/2024  3:15 PM Kendrick Stanley MD [facility]       Kendrick Stanley MD  [facility]

## 2024-04-15 NOTE — PROGRESS NOTES
Urine toxicology screen is negative for opiates.  She is getting refills every month but her last three screens have suggested she is not taking them.  This is a red flag for diversion.  Please confirm when her most recent dose prior to this urine test was.

## 2024-04-24 ENCOUNTER — TELEPHONE (OUTPATIENT)
Dept: PRIMARY CARE CLINIC | Facility: CLINIC | Age: 54
End: 2024-04-24
Payer: COMMERCIAL

## 2024-04-24 NOTE — TELEPHONE ENCOUNTER
----- Message from Venessa Gordon sent at 4/24/2024 10:11 AM CDT -----  Contact: Self/ [phone]  1MEDICALADVICE     Patient is calling for Medical Advice regarding:weight loss     How long has patient had these symptoms:    Pharmacy name and phone#: [id number] - [place] - [address]  [address]  [place] [address]  Phone: [phone] Fax: [phone]      Would like response via BackTypet: Return call     Comments: pt stated that she is calling in regards to needing to know if the doctor could prescribe something to assist her with weight loss. Please advise

## 2024-05-01 DIAGNOSIS — Z12.31 OTHER SCREENING MAMMOGRAM: ICD-10-CM

## 2024-05-02 DIAGNOSIS — G89.29 CHRONIC BILATERAL LOW BACK PAIN WITHOUT SCIATICA: ICD-10-CM

## 2024-05-02 DIAGNOSIS — M54.50 CHRONIC BILATERAL LOW BACK PAIN WITHOUT SCIATICA: ICD-10-CM

## 2024-05-02 NOTE — TELEPHONE ENCOUNTER
----- Message from Mishel Hamilton sent at 5/2/2024 12:19 PM CDT -----  Contact: [phone] pt  Requesting an RX refill or new RX.  Is this a refill or new RX:   RX name and strength (copy/paste from chart):  oxyCODONE-acetaminophen (PERCOCET)  mg per tablet 21 tablet  Is this a 30 day or 90 day RX:   Pharmacy name and phone # (copy/paste from chart):    [facility]  [address] [address]  [place] [address]  Phone: [phone] Fax: [phone]

## 2024-05-03 RX ORDER — SEMAGLUTIDE 1.34 MG/ML
1 INJECTION, SOLUTION SUBCUTANEOUS
COMMUNITY
Start: 2024-02-02 | End: 2024-05-07

## 2024-05-03 NOTE — TELEPHONE ENCOUNTER
----- Message from Nanda George sent at 5/3/2024  2:41 PM CDT -----  Contact: [phone]  Requesting an RX refill or new RX.  Is this a refill or new RX: refill  RX name and strength (copy/paste from chart): oxyCODONE-acetaminophen (PERCOCET)  mg per tablet   Is this a 30 day or 90 day RX: 21 Tablets   Pharmacy name and phone # (copy/paste from chart):   [facility]  [address] [address]  [place] [address]  Phone: [phone] Fax: [phone]      The doctors have asked that we provide their patients with the following 2 reminders -- prescription refills can take up to 72 hours, and a friendly reminder that in the future you can use your [facility] account to request refills: yes

## 2024-05-03 NOTE — TELEPHONE ENCOUNTER
No care due was identified.  [facility] Embedded Care Due Messages. Reference number: [id number].   5/03/2024 2:51:00 PM CDT

## 2024-05-03 NOTE — TELEPHONE ENCOUNTER
----- Message from Nanda George sent at 5/3/2024  2:42 PM CDT -----  Contact: [phone]  1MEDICALADVICE     Patient is calling for Medical Advice regarding:weight loss injection     How long has patient had these symptoms:    Pharmacy name and phone#:  [facility]  [address]  [place] [address]  Phone: [phone] Fax: [phone]         Would like response via Boost Communicationst:  no     Comments:  She is asking for this as well and asking for this to go here

## 2024-05-06 DIAGNOSIS — S39.012D LUMBAR STRAIN, SUBSEQUENT ENCOUNTER: ICD-10-CM

## 2024-05-06 DIAGNOSIS — S50.01XD CONTUSION OF RIGHT ELBOW, SUBSEQUENT ENCOUNTER: ICD-10-CM

## 2024-05-06 DIAGNOSIS — S96.911D RIGHT ANKLE STRAIN, SUBSEQUENT ENCOUNTER: ICD-10-CM

## 2024-05-06 DIAGNOSIS — S96.911D RIGHT FOOT STRAIN, SUBSEQUENT ENCOUNTER: ICD-10-CM

## 2024-05-06 DIAGNOSIS — S46.911D STRAIN OF RIGHT SHOULDER, SUBSEQUENT ENCOUNTER: ICD-10-CM

## 2024-05-06 RX ORDER — OXYCODONE AND ACETAMINOPHEN 10; 325 MG/1; MG/1
1 TABLET ORAL EVERY 4 HOURS PRN
Qty: 21 TABLET | Refills: 0 | Status: SHIPPED | OUTPATIENT
Start: 2024-05-06 | End: 2024-06-10 | Stop reason: SDUPTHER

## 2024-05-06 RX ORDER — SEMAGLUTIDE 1.34 MG/ML
1 INJECTION, SOLUTION SUBCUTANEOUS
OUTPATIENT
Start: 2024-05-06

## 2024-05-06 RX ORDER — IBUPROFEN 800 MG/1
800 TABLET ORAL 3 TIMES DAILY
Qty: 90 TABLET | Refills: 1 | Status: SHIPPED | OUTPATIENT
Start: 2024-05-06

## 2024-05-06 NOTE — TELEPHONE ENCOUNTER
----- Message from Mishel Youssefttdanyelle Hamilton sent at 5/6/2024  3:28 PM CDT -----  Contact: [phone] pt  Requesting an RX refill or new RX.  Is this a refill or new RX: new  RX name and strength (copy/paste from chart):  oxyCODONE-acetaminophen (PERCOCET)  mg per tablet 21 tablet  Is this a 30 day or 90 day RX:   Pharmacy name and phone # (copy/paste from chart):    [facility]  [address]  [place] [address]  Phone: [phone] Fax: [phone]    Requesting an RX refill or new RX.  Is this a refill or new RX: new  RX name and strength (copy/paste from chart):  ibuprofen (ADVIL,MOTRIN) 800 MG tablet 90 tablet  Is this a 30 day or 90 day RX:   Pharmacy name and phone # (copy/paste from chart):    [facility]  [address]  [place] [address]  Phone: [phone] Fax: [phone]

## 2024-05-06 NOTE — TELEPHONE ENCOUNTER
We're no longer prescribing ozempic without a diagnosis of diabetes.  We can send wegovy to a compounding pharmacy.

## 2024-05-06 NOTE — TELEPHONE ENCOUNTER
No care due was identified.  [facility] Embedded Care Due Messages. Reference number: [id number].   5/06/2024 3:33:42 PM CDT

## 2024-05-07 ENCOUNTER — PATIENT MESSAGE (OUTPATIENT)
Dept: PRIMARY CARE CLINIC | Facility: CLINIC | Age: 54
End: 2024-05-07

## 2024-05-07 RX ORDER — SEMAGLUTIDE 1 MG/.5ML
1 INJECTION, SOLUTION SUBCUTANEOUS
Qty: 2 ML | Refills: 2 | Status: SHIPPED | OUTPATIENT
Start: 2024-05-07 | End: 2024-06-07

## 2024-05-15 ENCOUNTER — PATIENT MESSAGE (OUTPATIENT)
Dept: ADMINISTRATIVE | Facility: HOSPITAL | Age: 54
End: 2024-05-15

## 2024-05-16 DIAGNOSIS — Z12.11 SCREENING FOR COLON CANCER: ICD-10-CM

## 2024-05-31 ENCOUNTER — HOSPITAL ENCOUNTER (OUTPATIENT)
Dept: RADIOLOGY | Facility: HOSPITAL | Age: 54
Discharge: HOME | End: 2024-05-31
Attending: INTERNAL MEDICINE

## 2024-05-31 VITALS — WEIGHT: 256 LBS | HEIGHT: 64 IN | BODY MASS INDEX: 43.71 KG/M2

## 2024-05-31 DIAGNOSIS — Z12.31 OTHER SCREENING MAMMOGRAM: ICD-10-CM

## 2024-05-31 PROCEDURE — 77067 SCR MAMMO BI INCL CAD: CPT | Mod: 26,,, | Performed by: RADIOLOGY

## 2024-05-31 PROCEDURE — 77063 BREAST TOMOSYNTHESIS BI: CPT | Mod: 26,,, | Performed by: RADIOLOGY

## 2024-05-31 PROCEDURE — 77063 BREAST TOMOSYNTHESIS BI: CPT | Mod: TC

## 2024-06-05 ENCOUNTER — TELEPHONE (OUTPATIENT)
Dept: PRIMARY CARE CLINIC | Facility: CLINIC | Age: 54
End: 2024-06-05
Payer: OTHER MISCELLANEOUS

## 2024-06-05 DIAGNOSIS — G89.29 CHRONIC BILATERAL LOW BACK PAIN WITHOUT SCIATICA: ICD-10-CM

## 2024-06-05 DIAGNOSIS — M54.50 CHRONIC BILATERAL LOW BACK PAIN WITHOUT SCIATICA: ICD-10-CM

## 2024-06-05 RX ORDER — OXYCODONE AND ACETAMINOPHEN 10; 325 MG/1; MG/1
1 TABLET ORAL EVERY 4 HOURS PRN
Qty: 21 TABLET | Refills: 0 | Status: CANCELLED | OUTPATIENT
Start: 2024-06-05

## 2024-06-05 NOTE — TELEPHONE ENCOUNTER
Care Due:                  Date            Visit Type   Department     Provider  --------------------------------------------------------------------------------                                EP -                              PRIMARY      [facility] PR[facility]  Last Visit: 04-      CARE ([facility])   CARE           Kendrick Meraz                              EP -                              PRIMARY      [facility] PRIMARY  Next Visit: 07-      CARE ([facility])   CARE           Kendrick Meraz                                                            Last  Test          Frequency    Reason                     Performed    Due Date  --------------------------------------------------------------------------------    CBC.........  12 months..  ibuprofen................  04- 04-    CMP.........  12 months..  ibuprofen,                 04- 04-                             triamterene-hydrochloroth                             iazide...................    HBA1C.......  6 months...  semaglutide,.............  04-   10-    [facility] Embedded Care Due Messages. Reference number: [id number].   6/05/2024 10:47:26 AM CDT

## 2024-06-05 NOTE — TELEPHONE ENCOUNTER
----- Message from Sirisha Bishop sent at 6/5/2024 10:43 AM CDT -----  Contact: Indira   Indira would like a call back. She said she would like to see if she can get the pill for weight loss instead of the shot

## 2024-06-07 DIAGNOSIS — G89.29 CHRONIC BILATERAL LOW BACK PAIN WITHOUT SCIATICA: ICD-10-CM

## 2024-06-07 DIAGNOSIS — M54.50 CHRONIC BILATERAL LOW BACK PAIN WITHOUT SCIATICA: ICD-10-CM

## 2024-06-07 RX ORDER — OXYCODONE AND ACETAMINOPHEN 10; 325 MG/1; MG/1
1 TABLET ORAL EVERY 4 HOURS PRN
Qty: 21 TABLET | Refills: 0 | Status: CANCELLED | OUTPATIENT
Start: 2024-06-07

## 2024-06-07 RX ORDER — PHENTERMINE HYDROCHLORIDE 37.5 MG/1
37.5 TABLET ORAL
Qty: 30 TABLET | Refills: 0 | Status: CANCELLED | OUTPATIENT
Start: 2024-06-07 | End: 2024-07-07

## 2024-06-07 NOTE — TELEPHONE ENCOUNTER
----- Message from Adelejenny Mauro sent at 6/7/2024 10:06 AM CDT -----  Contact: Mobile  [phone]  Requesting an RX refill or new RX.  Is this a refill or new RX: Refill  RX name and strength oxyCODONE-acetaminophen (PERCOCET)  mg per table  Is this a 30 day or 90 day RX: 21  Pharmacy name and phone #   [facility]  [address]  [facility] [address]  Phone: [phone] Fax: [phone]  The doctors have asked that we provide their patients with the following 2 reminders -- prescription refills can take up to 72 hours, and a friendly reminder that in the future you can use your [facility] account to request refills:    Comment: Patient would like to stop taking Wegovy because her insurance will not cover it, and she would like to be put back on Adpac.  Requesting an RX refill or new RX.  Is this a refill or new RX: Refill  RX name and strength Adpac  Is this a 30 day or 90 day RX: N/A  Pharmacy name and phone #   [facility] [place]  [address]  [address]  [place] [address]  Phone: [phone] Fax: [phone]  The doctors have asked that we provide their patients with the following 2 reminders -- prescription refills can take up to 72 hours, and a friendly reminder that in the future you can use your [facility] account to request refills:

## 2024-06-07 NOTE — TELEPHONE ENCOUNTER
Percocet pended     Pt also would like to see about changing back to adipex, insurance is no longer covering Wegovy. Scripts pended

## 2024-06-07 NOTE — TELEPHONE ENCOUNTER
No care due was identified.  [facility] Embedded Care Due Messages. Reference number: [id number].   6/07/2024 10:22:55 AM CDT

## 2024-06-10 ENCOUNTER — PATIENT MESSAGE (OUTPATIENT)
Dept: PRIMARY CARE CLINIC | Facility: CLINIC | Age: 54
End: 2024-06-10
Payer: OTHER MISCELLANEOUS

## 2024-06-10 DIAGNOSIS — G89.29 CHRONIC BILATERAL LOW BACK PAIN WITHOUT SCIATICA: ICD-10-CM

## 2024-06-10 DIAGNOSIS — M54.50 CHRONIC BILATERAL LOW BACK PAIN WITHOUT SCIATICA: ICD-10-CM

## 2024-06-10 RX ORDER — OXYCODONE AND ACETAMINOPHEN 10; 325 MG/1; MG/1
1 TABLET ORAL EVERY 4 HOURS PRN
Qty: 21 TABLET | Refills: 0 | Status: SHIPPED | OUTPATIENT
Start: 2024-06-10

## 2024-07-08 DIAGNOSIS — G89.29 CHRONIC BILATERAL LOW BACK PAIN WITHOUT SCIATICA: ICD-10-CM

## 2024-07-08 DIAGNOSIS — M54.50 CHRONIC BILATERAL LOW BACK PAIN WITHOUT SCIATICA: ICD-10-CM

## 2024-07-08 RX ORDER — OXYCODONE AND ACETAMINOPHEN 10; 325 MG/1; MG/1
1 TABLET ORAL EVERY 4 HOURS PRN
Qty: 21 TABLET | Refills: 0 | Status: CANCELLED | OUTPATIENT
Start: 2024-07-08

## 2024-07-08 NOTE — TELEPHONE ENCOUNTER
No care due was identified.  [facility] Embedded Care Due Messages. Reference number: [id number].   7/08/2024 9:23:23 AM CDT   Patient: Aracelis Vargas    Procedure Summary     Date: 11/03/21 Room / Location: [facility]  / [facility] CATH INVASIVE LOCATION    Anesthesia Start: 0755 Anesthesia Stop: 0926    Procedures:       Pacemaker DC new (Left )      LOOP RECORDER REMOVAL (N/A ) Diagnosis:       Atrioventricular block, Mobitz type 1, Wenckebach      Second degree AV block, Mobitz type II      Syncope and collapse    Providers: Wang Plaza MD Provider: Nj Land MD    Anesthesia Type: MAC ASA Status: 4          Anesthesia Type: MAC    Vitals  Vitals Value Taken Time   /51 11/03/21 1033   Temp     Pulse 80 11/03/21 1034   Resp     SpO2 98 % 11/03/21 1034   Vitals shown include unvalidated device data.        Post Anesthesia Care and Evaluation    Patient location during evaluation: bedside  Patient participation: complete - patient participated  Level of consciousness: sleepy but conscious and responsive to verbal stimuli  Pain score: 0  Pain management: adequate  Airway patency: patent  Anesthetic complications: No anesthetic complications  PONV Status: none  Cardiovascular status: acceptable  Respiratory status: acceptable  Hydration status: acceptable

## 2024-07-08 NOTE — TELEPHONE ENCOUNTER
----- Message from GREGCarlos Flowers sent at 7/8/2024  9:10 AM CDT -----  Contact: Self [phone]  Requesting an RX refill or new RX.    Is this a refill or new RX: refill    RX name and strength (copy/paste from chart):  oxyCODONE-acetaminophen (PERCOCET)  mg per tablet     Is this a 30 day or 90 day RX: 21    Pharmacy name and phone # (copy/paste from chart):      [facility]  [address] [address]  [place] [address]  Phone: [phone] Fax: [phone]

## 2024-07-10 ENCOUNTER — OFFICE VISIT (OUTPATIENT)
Dept: PRIMARY CARE CLINIC | Facility: CLINIC | Age: 54
End: 2024-07-10
Payer: COMMERCIAL

## 2024-07-10 VITALS
DIASTOLIC BLOOD PRESSURE: 80 MMHG | HEIGHT: 64 IN | BODY MASS INDEX: 43.36 KG/M2 | RESPIRATION RATE: 18 BRPM | HEART RATE: 61 BPM | WEIGHT: 254 LBS | SYSTOLIC BLOOD PRESSURE: 120 MMHG | OXYGEN SATURATION: 98 % | TEMPERATURE: 97 F

## 2024-07-10 DIAGNOSIS — Z79.891 ENCOUNTER FOR MONITORING OPIOID MAINTENANCE THERAPY: Primary | ICD-10-CM

## 2024-07-10 DIAGNOSIS — K21.9 GERD WITHOUT ESOPHAGITIS: ICD-10-CM

## 2024-07-10 DIAGNOSIS — Z51.81 ENCOUNTER FOR MONITORING OPIOID MAINTENANCE THERAPY: Primary | ICD-10-CM

## 2024-07-10 PROCEDURE — 3079F DIAST BP 80-89 MM HG: CPT | Mod: CPTII,S$GLB,, | Performed by: INTERNAL MEDICINE

## 2024-07-10 PROCEDURE — 3044F HG A1C LEVEL LT 7.0%: CPT | Mod: CPTII,S$GLB,, | Performed by: INTERNAL MEDICINE

## 2024-07-10 PROCEDURE — 99999 PR PBB SHADOW E&M-EST. PATIENT-LVL IV: CPT | Mod: PBBFAC,,, | Performed by: INTERNAL MEDICINE

## 2024-07-10 PROCEDURE — 3074F SYST BP LT 130 MM HG: CPT | Mod: CPTII,S$GLB,, | Performed by: INTERNAL MEDICINE

## 2024-07-10 PROCEDURE — 1159F MED LIST DOCD IN RCRD: CPT | Mod: CPTII,S$GLB,, | Performed by: INTERNAL MEDICINE

## 2024-07-10 PROCEDURE — 3008F BODY MASS INDEX DOCD: CPT | Mod: CPTII,S$GLB,, | Performed by: INTERNAL MEDICINE

## 2024-07-10 PROCEDURE — 99214 OFFICE O/P EST MOD 30 MIN: CPT | Mod: S$GLB,,, | Performed by: INTERNAL MEDICINE

## 2024-07-10 RX ORDER — PANTOPRAZOLE SODIUM 40 MG/1
40 TABLET, DELAYED RELEASE ORAL DAILY
Qty: 14 TABLET | Refills: 0 | Status: SHIPPED | OUTPATIENT
Start: 2024-07-10 | End: 2024-07-26

## 2024-07-10 NOTE — PROGRESS NOTES
[facility] Note    Chief Complaint      Chief Complaint   Patient presents with    Follow-up     3m    Heartburn       History of Present Illness      Indira Yousif is a 53 y.o. female with chronic conditions of allergic rhinosinusitis, chronic low back pain, chronic venous insufficiency who presents today for: follow up chronic conditions.  Reports compliance with opiate regimen despite having last few UDS that were negative for opiates.  Complains of heartburn.      Past Medical History:  Past Medical History:   Diagnosis Date    Anxiety        Past Surgical History:   has a past surgical history that includes Tubal ligation;  section; Hernia repair; Cyst Removal (Left, 1980); and Hysterectomy.    Family History:  family history includes Diabetes in her mother; Hypertension in her father and mother; Kidney disease in her mother; No Known Problems in her brother, brother, brother, brother, sister, son, and son.     Social History:  Social History     Tobacco Use    Smoking status: Never    Smokeless tobacco: Never   Substance Use Topics    Alcohol use: Yes     Comment: occasionally    Drug use: No       I personally reviewed all past medical, surgical, social and family history.    Review of Systems   Constitutional:  Negative for chills, fever and malaise/fatigue.   Respiratory:  Negative for shortness of breath.    Cardiovascular:  Negative for chest pain.   Gastrointestinal:  Negative for constipation, diarrhea, nausea and vomiting.   Skin:  Negative for rash.   Neurological:  Negative for weakness.   All other systems reviewed and are negative.       Medications:  Outpatient Encounter Medications as of 7/10/2024   Medication Sig Dispense Refill    ALPRAZolam (XANAX) 0.5 MG tablet Take 1 tablet (0.5 mg total) by mouth 3 (three) times daily as needed for Anxiety. 21 tablet 0    ammonium lactate 12 % Crea Apply small amount to feet except for in between toes twice a day 140  "g 6    cetirizine (ZYRTEC) 10 MG tablet Take 1 tablet (10 mg total) by mouth 2 (two) times a day. for 7 days 14 tablet 0    diclofenac sodium (VOLTAREN) 1 % Gel Apply 2 g topically 4 (four) times daily. 100 g 0    ibuprofen (ADVIL,MOTRIN) 800 MG tablet Take 1 tablet (800 mg total) by mouth 3 (three) times daily. 90 tablet 1    naloxone (NARCAN) 4 mg/actuation Spry 4mg by nasal route as needed for opioid overdose; may repeat every 2-3 minutes in alternating nostrils until medical help arrives. Call 911 2 each 11    oxyCODONE-acetaminophen (PERCOCET)  mg per tablet Take 1 tablet by mouth every 4 (four) hours as needed for Pain. 21 tablet 0    pen needle, diabetic (PEN NEEDLE) 32 gauge x 5/32" Ndle Use as directed for semaglutide injection 100 each 1    traZODone (DESYREL) 50 MG tablet Take 1 tablet (50 mg total) by mouth every evening. 30 tablet 11    triamterene-hydrochlorothiazide 37.5-25 mg (MAXZIDE-25) 37.5-25 mg per tablet Take 1 tablet by mouth daily as needed (leg swelling). 90 tablet 3    pantoprazole (PROTONIX) 40 MG tablet Take 1 tablet (40 mg total) by mouth once daily. for 14 days 14 tablet 0     No facility-administered encounter medications on file as of 7/10/2024.       Allergies:  Review of patient's allergies indicates:  No Known Allergies    Health Maintenance:  Immunization History   Administered Date(s) Administered    COVID-19, MRNA, LN-S, PF (MODERNA FULL 0.5 ML DOSE) 03/16/2021, 04/13/2021    DTP 11/28/1973, 01/31/1974, 03/23/1977, 08/31/1977    Influenza 10/17/2018    Influenza - Quadrivalent 10/19/2016    Influenza - Quadrivalent - PF *Preferred* (6 months and older) 10/02/2017, 10/17/2018    MMR 01/31/1974    OPV 11/28/1973, 01/31/1974, 03/23/1977, 08/31/1977      Health Maintenance   Topic Date Due    TETANUS VACCINE  Never done    Shingles Vaccine (1 of 2) Never done    Mammogram  05/31/2025    Colorectal Cancer Screening  07/04/2025    Lipid Panel  04/14/2028    Hepatitis C Screening " " Completed        Physical Exam      Vital Signs  Temp: 97 °F (36.1 °C)  Temp Source: Oral  Pulse: 61  Resp: 18  SpO2: 98 %  BP: 120/80  BP Location: Right arm  Patient Position: Sitting  Pain Score: 0-No pain  Height and Weight  Height: 5' 4" (162.6 cm)  Weight: 115.2 kg (253 lb 15.5 oz)  BSA (Calculated - sq m): 2.28 sq meters  BMI (Calculated): 43.6  Weight in (lb) to have BMI = 25: 145.3]    Physical Exam  Vitals reviewed.   Constitutional:       Appearance: She is well-developed.   HENT:      Head: Normocephalic and atraumatic.      Right Ear: External ear normal.      Left Ear: External ear normal.   Cardiovascular:      Rate and Rhythm: Normal rate and regular rhythm.      Heart sounds: Normal heart sounds. No murmur heard.  Pulmonary:      Effort: Pulmonary effort is normal.      Breath sounds: Normal breath sounds. No wheezing or rales.   Abdominal:      General: Bowel sounds are normal. There is no distension.      Palpations: Abdomen is soft.      Tenderness: There is no abdominal tenderness.          Laboratory:  CBC:  Recent Labs   Lab 08/11/22 0618 04/14/23 0818   WBC 6.32 5.15   RBC 4.78 4.53   Hemoglobin 12.3 11.6 L   Hematocrit 37.8 37.8   Platelets 255 237   MCV 79 L 83   MCH 25.7 L 25.6 L   MCHC 32.5 30.7 L     CMP:  Recent Labs   Lab 08/11/22 0618 04/14/23 0818   Glucose 105 86   Calcium 8.7 9.3   Albumin 4.1 3.6   Total Protein 7.9 7.4   Sodium 138 139   Potassium 4.2 3.9   CO2 29 25   Chloride 99 105   BUN 9 17   Alkaline Phosphatase 81 59   ALT 12 7 L   AST 19 11   Total Bilirubin 1.3 H 0.7     URINALYSIS:       LIPIDS:  Recent Labs   Lab 08/11/22 0618 04/14/23  0818   TSH 1.050 0.378 L   HDL 81 H 66   Cholesterol 207 H 198   Triglycerides 46 44   LDL Cholesterol 116.8 123.2   HDL/Cholesterol Ratio 39.1 33.3   Non-HDL Cholesterol 126 132   Total Cholesterol/HDL Ratio 2.6 3.0     TSH:  Recent Labs   Lab 08/11/22 0618 04/14/23 0818   TSH 1.050 0.378 L     A1C:  Recent Labs   Lab " 08/11/22  0618 04/14/23  0818   Hemoglobin A1C 5.4 5.3       Assessment/Plan     Indira Yousif is a 53 y.o.female with:    1. Encounter for monitoring opioid maintenance therapy  Check UDS and if shows compliance, will continue regimen  2. GERD without esophagitis  - pantoprazole (PROTONIX) 40 MG tablet; Take 1 tablet (40 mg total) by mouth once daily. for 14 days  Dispense: 14 tablet; Refill: 0   Start trial of protonix.    Chronic conditions status updated as per HPI.  Other than changes above, cont current medications and maintain follow up with specialists.  No follow-ups on file.    No future appointments.    Kendrick Stanley MD  [facility]

## 2024-07-11 ENCOUNTER — LAB VISIT (OUTPATIENT)
Dept: LAB | Facility: HOSPITAL | Age: 54
End: 2024-07-11
Attending: INTERNAL MEDICINE
Payer: COMMERCIAL

## 2024-07-11 DIAGNOSIS — Z51.81 ENCOUNTER FOR MONITORING OPIOID MAINTENANCE THERAPY: ICD-10-CM

## 2024-07-11 DIAGNOSIS — Z00.00 ANNUAL PHYSICAL EXAM: ICD-10-CM

## 2024-07-11 DIAGNOSIS — M54.6 CHRONIC LEFT-SIDED THORACIC BACK PAIN: ICD-10-CM

## 2024-07-11 DIAGNOSIS — Z79.891 ENCOUNTER FOR MONITORING OPIOID MAINTENANCE THERAPY: ICD-10-CM

## 2024-07-11 DIAGNOSIS — G89.29 CHRONIC LEFT-SIDED THORACIC BACK PAIN: ICD-10-CM

## 2024-07-11 LAB
ALBUMIN SERPL BCP-MCNC: 3.5 G/DL (ref 3.5–5.2)
ALP SERPL-CCNC: 69 U/L (ref 55–135)
ALT SERPL W/O P-5'-P-CCNC: 8 U/L (ref 10–44)
AMPHET+METHAMPHET UR QL: NEGATIVE
AMPHET+METHAMPHET UR QL: NEGATIVE
ANION GAP SERPL CALC-SCNC: 5 MMOL/L (ref 8–16)
AST SERPL-CCNC: 13 U/L (ref 10–40)
BARBITURATES UR QL SCN>200 NG/ML: NEGATIVE
BARBITURATES UR QL SCN>200 NG/ML: NEGATIVE
BASOPHILS # BLD AUTO: 0.04 K/UL (ref 0–0.2)
BASOPHILS NFR BLD: 0.7 % (ref 0–1.9)
BENZODIAZ UR QL SCN>200 NG/ML: NEGATIVE
BENZODIAZ UR QL SCN>200 NG/ML: NEGATIVE
BILIRUB SERPL-MCNC: 0.5 MG/DL (ref 0.1–1)
BUN SERPL-MCNC: 11 MG/DL (ref 6–20)
BZE UR QL SCN: NEGATIVE
BZE UR QL SCN: NEGATIVE
CALCIUM SERPL-MCNC: 9 MG/DL (ref 8.7–10.5)
CANNABINOIDS UR QL SCN: NEGATIVE
CANNABINOIDS UR QL SCN: NEGATIVE
CHLORIDE SERPL-SCNC: 106 MMOL/L (ref 95–110)
CHOLEST SERPL-MCNC: 184 MG/DL (ref 120–199)
CHOLEST/HDLC SERPL: 3 {RATIO} (ref 2–5)
CO2 SERPL-SCNC: 30 MMOL/L (ref 23–29)
CREAT SERPL-MCNC: 0.9 MG/DL (ref 0.5–1.4)
CREAT UR-MCNC: 168 MG/DL (ref 15–325)
CREAT UR-MCNC: 168 MG/DL (ref 15–325)
DIFFERENTIAL METHOD BLD: ABNORMAL
EOSINOPHIL # BLD AUTO: 0.2 K/UL (ref 0–0.5)
EOSINOPHIL NFR BLD: 2.8 % (ref 0–8)
ERYTHROCYTE [DISTWIDTH] IN BLOOD BY AUTOMATED COUNT: 14.5 % (ref 11.5–14.5)
EST. GFR  (NO RACE VARIABLE): >60 ML/MIN/1.73 M^2
ESTIMATED AVG GLUCOSE: 117 MG/DL (ref 68–131)
ETHANOL UR-MCNC: <10 MG/DL
ETHANOL UR-MCNC: <10 MG/DL
GLUCOSE SERPL-MCNC: 81 MG/DL (ref 70–110)
HBA1C MFR BLD: 5.7 % (ref 4–5.6)
HCT VFR BLD AUTO: 38.6 % (ref 37–48.5)
HDLC SERPL-MCNC: 61 MG/DL (ref 40–75)
HDLC SERPL: 33.2 % (ref 20–50)
HGB BLD-MCNC: 11.8 G/DL (ref 12–16)
IMM GRANULOCYTES # BLD AUTO: 0.01 K/UL (ref 0–0.04)
IMM GRANULOCYTES NFR BLD AUTO: 0.2 % (ref 0–0.5)
LDLC SERPL CALC-MCNC: 109.6 MG/DL (ref 63–159)
LYMPHOCYTES # BLD AUTO: 2.3 K/UL (ref 1–4.8)
LYMPHOCYTES NFR BLD: 40.9 % (ref 18–48)
MCH RBC QN AUTO: 25.8 PG (ref 27–31)
MCHC RBC AUTO-ENTMCNC: 30.6 G/DL (ref 32–36)
MCV RBC AUTO: 85 FL (ref 82–98)
METHADONE UR QL SCN>300 NG/ML: NEGATIVE
METHADONE UR QL SCN>300 NG/ML: NEGATIVE
MONOCYTES # BLD AUTO: 0.4 K/UL (ref 0.3–1)
MONOCYTES NFR BLD: 7.5 % (ref 4–15)
NEUTROPHILS # BLD AUTO: 2.7 K/UL (ref 1.8–7.7)
NEUTROPHILS NFR BLD: 47.9 % (ref 38–73)
NONHDLC SERPL-MCNC: 123 MG/DL
NRBC BLD-RTO: 0 /100 WBC
OPIATES UR QL SCN: ABNORMAL
OPIATES UR QL SCN: ABNORMAL
PCP UR QL SCN>25 NG/ML: NEGATIVE
PCP UR QL SCN>25 NG/ML: NEGATIVE
PLATELET # BLD AUTO: 214 K/UL (ref 150–450)
PMV BLD AUTO: 12.9 FL (ref 9.2–12.9)
POTASSIUM SERPL-SCNC: 3.9 MMOL/L (ref 3.5–5.1)
PROT SERPL-MCNC: 7.3 G/DL (ref 6–8.4)
RBC # BLD AUTO: 4.57 M/UL (ref 4–5.4)
SODIUM SERPL-SCNC: 141 MMOL/L (ref 136–145)
T4 FREE SERPL-MCNC: 0.96 NG/DL (ref 0.71–1.51)
TOXICOLOGY INFORMATION: ABNORMAL
TOXICOLOGY INFORMATION: ABNORMAL
TRIGL SERPL-MCNC: 67 MG/DL (ref 30–150)
TSH SERPL DL<=0.005 MIU/L-ACNC: 0.37 UIU/ML (ref 0.4–4)
WBC # BLD AUTO: 5.63 K/UL (ref 3.9–12.7)

## 2024-07-11 PROCEDURE — 80053 COMPREHEN METABOLIC PANEL: CPT | Performed by: INTERNAL MEDICINE

## 2024-07-11 PROCEDURE — 80061 LIPID PANEL: CPT | Performed by: INTERNAL MEDICINE

## 2024-07-11 PROCEDURE — 80307 DRUG TEST PRSMV CHEM ANLYZR: CPT | Performed by: INTERNAL MEDICINE

## 2024-07-11 PROCEDURE — 85025 COMPLETE CBC W/AUTO DIFF WBC: CPT | Performed by: INTERNAL MEDICINE

## 2024-07-11 PROCEDURE — 84439 ASSAY OF FREE THYROXINE: CPT | Performed by: INTERNAL MEDICINE

## 2024-07-11 PROCEDURE — 83036 HEMOGLOBIN GLYCOSYLATED A1C: CPT | Performed by: INTERNAL MEDICINE

## 2024-07-11 PROCEDURE — 84443 ASSAY THYROID STIM HORMONE: CPT | Performed by: INTERNAL MEDICINE

## 2024-07-11 PROCEDURE — 36415 COLL VENOUS BLD VENIPUNCTURE: CPT | Performed by: INTERNAL MEDICINE

## 2024-07-12 ENCOUNTER — PATIENT MESSAGE (OUTPATIENT)
Dept: PRIMARY CARE CLINIC | Facility: CLINIC | Age: 54
End: 2024-07-12
Payer: COMMERCIAL

## 2024-07-12 DIAGNOSIS — G89.29 CHRONIC BILATERAL LOW BACK PAIN WITHOUT SCIATICA: ICD-10-CM

## 2024-07-12 DIAGNOSIS — M54.50 CHRONIC BILATERAL LOW BACK PAIN WITHOUT SCIATICA: ICD-10-CM

## 2024-07-12 RX ORDER — OXYCODONE AND ACETAMINOPHEN 10; 325 MG/1; MG/1
1 TABLET ORAL EVERY 4 HOURS PRN
Qty: 21 TABLET | Refills: 0 | Status: SHIPPED | OUTPATIENT
Start: 2024-07-12

## 2024-08-13 ENCOUNTER — TELEPHONE (OUTPATIENT)
Dept: PRIMARY CARE CLINIC | Facility: CLINIC | Age: 54
End: 2024-08-13
Payer: COMMERCIAL

## 2024-08-13 DIAGNOSIS — S96.911D RIGHT ANKLE STRAIN, SUBSEQUENT ENCOUNTER: ICD-10-CM

## 2024-08-13 DIAGNOSIS — S39.012D LUMBAR STRAIN, SUBSEQUENT ENCOUNTER: ICD-10-CM

## 2024-08-13 DIAGNOSIS — S50.01XD CONTUSION OF RIGHT ELBOW, SUBSEQUENT ENCOUNTER: ICD-10-CM

## 2024-08-13 DIAGNOSIS — G89.29 CHRONIC BILATERAL LOW BACK PAIN WITHOUT SCIATICA: ICD-10-CM

## 2024-08-13 DIAGNOSIS — M54.50 CHRONIC BILATERAL LOW BACK PAIN WITHOUT SCIATICA: ICD-10-CM

## 2024-08-13 DIAGNOSIS — S46.911D STRAIN OF RIGHT SHOULDER, SUBSEQUENT ENCOUNTER: ICD-10-CM

## 2024-08-13 DIAGNOSIS — S96.911D RIGHT FOOT STRAIN, SUBSEQUENT ENCOUNTER: ICD-10-CM

## 2024-08-13 RX ORDER — IBUPROFEN 800 MG/1
800 TABLET ORAL 3 TIMES DAILY
Qty: 90 TABLET | Refills: 1 | Status: SHIPPED | OUTPATIENT
Start: 2024-08-13

## 2024-08-13 RX ORDER — OXYCODONE AND ACETAMINOPHEN 10; 325 MG/1; MG/1
1 TABLET ORAL EVERY 4 HOURS PRN
Qty: 21 TABLET | Refills: 0 | Status: SHIPPED | OUTPATIENT
Start: 2024-08-13

## 2024-08-13 NOTE — TELEPHONE ENCOUNTER
----- Message from Senait Lovett sent at 8/13/2024  8:56 AM CDT -----  Contact: [phone]  Requesting an RX refill or new RX.  Is this a refill or new RX: Refill 1  RX name and strength (copy/paste from chart):  oxyCODONE-acetaminophen (PERCOCET)  mg per tablet  Is this a 30 day or 90 day RX:   Pharmacy name and phone # (copy/paste from chart):  [facility]   Phone: [phone]  Fax: [phone]    Requesting an RX refill or new RX.  Is this a refill or new RX: Refill 2  RX name and strength (copy/paste from chart):  ibuprofen (ADVIL,MOTRIN) 800 MG tablet  Is this a 30 day or 90 day RX:   Pharmacy name and phone # (copy/paste from chart):  [facility]   Phone: [phone]  Fax: [phone]          The doctors have asked that we provide their patients with the following 2 reminders -- prescription refills can take up to 72 hours, and a friendly reminder that in the future you can use your [facility] account to request refills:

## 2024-09-09 DIAGNOSIS — M54.50 CHRONIC BILATERAL LOW BACK PAIN WITHOUT SCIATICA: ICD-10-CM

## 2024-09-09 DIAGNOSIS — G89.29 CHRONIC BILATERAL LOW BACK PAIN WITHOUT SCIATICA: ICD-10-CM

## 2024-09-09 RX ORDER — OXYCODONE AND ACETAMINOPHEN 10; 325 MG/1; MG/1
1 TABLET ORAL EVERY 4 HOURS PRN
Qty: 21 TABLET | Refills: 0 | Status: SHIPPED | OUTPATIENT
Start: 2024-09-09

## 2024-09-09 NOTE — TELEPHONE ENCOUNTER
----- Message from Teresa Sofia sent at 9/9/2024  3:26 PM CDT -----  Contact: [phone]  Prescription refill request.    RX name and strength (copy/paste from chart):   oxyCODONE-acetaminophen (PERCOCET)  mg per tablet    Is this a 30 day or 90 day RX:  n/a    Local pharmacy or mail order pharmacy:    [facility]  [address] [address]  [place] [address]  Phone: [phone] Fax: [phone]    Pharmacy name and phone # (copy/paste from chart):   call    Additional information:   Please call to advise

## 2024-09-09 NOTE — TELEPHONE ENCOUNTER
No care due was identified.  [employer] Embedded Care Due Messages. Reference number: [id number].   9/09/2024 3:29:23 PM CDT

## 2024-09-17 ENCOUNTER — OFFICE VISIT (OUTPATIENT)
Dept: URGENT CARE | Facility: CLINIC | Age: 54
End: 2024-09-17
Payer: COMMERCIAL

## 2024-09-17 VITALS
RESPIRATION RATE: 14 BRPM | OXYGEN SATURATION: 98 % | DIASTOLIC BLOOD PRESSURE: 70 MMHG | HEIGHT: 64 IN | WEIGHT: 254.44 LBS | TEMPERATURE: 98 F | SYSTOLIC BLOOD PRESSURE: 133 MMHG | HEART RATE: 76 BPM | BODY MASS INDEX: 43.44 KG/M2

## 2024-09-17 DIAGNOSIS — B96.89 ACUTE BACTERIAL BRONCHITIS: Primary | ICD-10-CM

## 2024-09-17 DIAGNOSIS — J20.8 ACUTE BACTERIAL BRONCHITIS: Primary | ICD-10-CM

## 2024-09-17 DIAGNOSIS — R09.89 CHEST CONGESTION: ICD-10-CM

## 2024-09-17 DIAGNOSIS — R05.8 OTHER COUGH: ICD-10-CM

## 2024-09-17 DIAGNOSIS — R09.81 NASAL CONGESTION: ICD-10-CM

## 2024-09-17 DIAGNOSIS — R09.82 POST-NASAL DRIP: ICD-10-CM

## 2024-09-17 LAB
CTP QC/QA: YES
SARS-COV-2 AG RESP QL IA.RAPID: NEGATIVE

## 2024-09-17 PROCEDURE — 99213 OFFICE O/P EST LOW 20 MIN: CPT | Mod: 25,S$GLB,, | Performed by: PHYSICIAN ASSISTANT

## 2024-09-17 PROCEDURE — 87811 SARS-COV-2 COVID19 W/OPTIC: CPT | Mod: QW,S$GLB,, | Performed by: PHYSICIAN ASSISTANT

## 2024-09-17 PROCEDURE — 96372 THER/PROPH/DIAG INJ SC/IM: CPT | Mod: S$GLB,,, | Performed by: PHYSICIAN ASSISTANT

## 2024-09-17 PROCEDURE — 71046 X-RAY EXAM CHEST 2 VIEWS: CPT | Mod: S$GLB,,, | Performed by: STUDENT IN AN ORGANIZED HEALTH CARE EDUCATION/TRAINING PROGRAM

## 2024-09-17 RX ORDER — LORATADINE 10 MG/1
10 TABLET ORAL DAILY PRN
Qty: 30 TABLET | Refills: 0 | Status: SHIPPED | OUTPATIENT
Start: 2024-09-17 | End: 2024-10-17

## 2024-09-17 RX ORDER — PROMETHAZINE HYDROCHLORIDE AND DEXTROMETHORPHAN HYDROBROMIDE 6.25; 15 MG/5ML; MG/5ML
5 SYRUP ORAL EVERY 6 HOURS PRN
Qty: 180 ML | Refills: 0 | Status: SHIPPED | OUTPATIENT
Start: 2024-09-17 | End: 2024-09-27

## 2024-09-17 RX ORDER — DEXAMETHASONE SODIUM PHOSPHATE 10 MG/ML
10 INJECTION INTRAMUSCULAR; INTRAVENOUS
Status: COMPLETED | OUTPATIENT
Start: 2024-09-17 | End: 2024-09-17

## 2024-09-17 RX ORDER — ALBUTEROL SULFATE 90 UG/1
2 INHALANT RESPIRATORY (INHALATION) EVERY 4 HOURS PRN
Qty: 18 G | Refills: 0 | Status: SHIPPED | OUTPATIENT
Start: 2024-09-17 | End: 2024-10-17

## 2024-09-17 RX ORDER — AZELASTINE HYDROCHLORIDE, FLUTICASONE PROPIONATE 137; 50 UG/1; UG/1
1 SPRAY, METERED NASAL 2 TIMES DAILY PRN
Qty: 23 G | Refills: 0 | Status: SHIPPED | OUTPATIENT
Start: 2024-09-17 | End: 2024-10-17

## 2024-09-17 RX ORDER — BENZONATATE 100 MG/1
100 CAPSULE ORAL 3 TIMES DAILY PRN
Qty: 30 CAPSULE | Refills: 0 | Status: SHIPPED | OUTPATIENT
Start: 2024-09-17 | End: 2024-09-27

## 2024-09-17 RX ORDER — AMOXICILLIN AND CLAVULANATE POTASSIUM 875; 125 MG/1; MG/1
1 TABLET, FILM COATED ORAL 2 TIMES DAILY
Qty: 20 TABLET | Refills: 0 | Status: CANCELLED | OUTPATIENT
Start: 2024-09-17 | End: 2024-09-27

## 2024-09-17 RX ORDER — AZITHROMYCIN 250 MG/1
TABLET, FILM COATED ORAL
Qty: 6 TABLET | Refills: 0 | Status: SHIPPED | OUTPATIENT
Start: 2024-09-17 | End: 2024-09-22

## 2024-09-17 RX ADMIN — DEXAMETHASONE SODIUM PHOSPHATE 10 MG: 10 INJECTION INTRAMUSCULAR; INTRAVENOUS at 11:09

## 2024-09-17 NOTE — LETTER
"  September 17, 2024      [facility]  [address] [address], [address]  [place] [address]  Phone: [phone]  Fax: [phone]       Patient: Indira Yousif   YOB: 1970  Date of Visit: 09/17/2024    To Whom It May Concern:    Autumn Yousif  was at [facility] on 09/17/2024. The patient may return to work/school on 9/17/24 with no restrictions. If you have any questions or concerns, or if I can be of further assistance, please do not hesitate to contact me.    Sincerely,        Giftybi Renteria PA-C (Jackie)       "

## 2024-09-17 NOTE — PROGRESS NOTES
"Subjective:      Patient ID: Indira Yousif is a 53 y.o. female.    Vitals:  height is 5' 4" (1.626 m) and weight is 115.4 kg (254 lb 6.6 oz). Her oral temperature is 98.4 °F (36.9 °C). Her blood pressure is 133/70 and her pulse is 76. Her respiration is 14 and oxygen saturation is 98%.     Chief Complaint: Cough    Indira Yousif is a 53 y.o. female who complains of  post nasal drip. Pt states that otc meds that she has taken gives her no relief. Pt denies having fever.Onset for 1 month.Pt states she has a productive that produces a green sputum. She reports nonstop coughing during day.  She states flonase does not work for her.     Cough  This is a recurrent problem. The current episode started more than 1 month ago. The problem has been unchanged. The problem occurs constantly. The cough is Productive of sputum. Associated symptoms include nasal congestion and postnasal drip. Pertinent negatives include no chest pain, chills, fever, myalgias, sore throat, shortness of breath or wheezing. Nothing aggravates the symptoms. Treatments tried: otc meds nyuil,dayquil,robutussin, mucinex. The treatment provided mild relief. There is no history of environmental allergies.     Constitution: Positive for fatigue. Negative for activity change, appetite change, chills, fever and generalized weakness.   HENT:  Positive for congestion and postnasal drip. Negative for sinus pain, sinus pressure, sore throat and trouble swallowing.    Cardiovascular:  Negative for chest pain, leg swelling, palpitations and sob on exertion.   Respiratory:  Positive for cough and sputum production. Negative for shortness of breath, wheezing and asthma.    Gastrointestinal:  Negative for nausea and vomiting.   Musculoskeletal:  Negative for muscle cramps and muscle ache.   Allergic/Immunologic: Negative for environmental allergies, seasonal allergies, food allergies and asthma.   Psychiatric/Behavioral:  Negative for nervous/anxious. " The patient is not nervous/anxious.       Objective:     Physical Exam   Constitutional: She is oriented to person, place, and time. She is cooperative. No distress.      Comments:Patient is awake and alert, sitting up in exam chair, speaking and answering in complete sentences   normal  HENT:   Head: Normocephalic and atraumatic.   Ears:   Right Ear: Tympanic membrane, external ear and ear canal normal.   Left Ear: Tympanic membrane, external ear and ear canal normal.   Nose: Congestion present. No mucosal edema, rhinorrhea or sinus tenderness. Right sinus exhibits no maxillary sinus tenderness and no frontal sinus tenderness. Left sinus exhibits no maxillary sinus tenderness and no frontal sinus tenderness.   Mouth/Throat: Uvula is midline, oropharynx is clear and moist and mucous membranes are normal. Mucous membranes are moist. No oropharyngeal exudate or posterior oropharyngeal erythema. Tonsils are 0 on the right. Tonsils are 0 on the left. Oropharynx is clear.      Comments:  postnasal discharge noted on the posterior pharyngeal wall    Eyes: Conjunctivae are normal. Pupils are equal, round, and reactive to light. Extraocular movement intact   Neck: Neck supple.   Cardiovascular: Normal rate, regular rhythm, normal heart sounds and normal pulses.   Pulmonary/Chest: Effort normal. No respiratory distress. She has no wheezes. She has rhonchi. She has no rales.   Abdominal: Normal appearance.   Musculoskeletal: Normal range of motion.         General: Normal range of motion.      Cervical back: She exhibits no tenderness.   Lymphadenopathy:     She has no cervical adenopathy.   Neurological: She is alert and oriented to person, place, and time.   Skin: Skin is warm.   Psychiatric: Her behavior is normal. Mood, judgment and thought content normal.   Nursing note and vitals reviewed.    XR CHEST PA AND LATERAL    Result Date: 9/17/2024  EXAMINATION: XR CHEST PA AND LATERAL CLINICAL HISTORY: Other specified cough  TECHNIQUE: PA and lateral views of the chest were performed. COMPARISON: Chest radiograph 07/26/2018 FINDINGS: Cardiomediastinal silhouette is unchanged in size.  Mild aortic atherosclerosis. Lungs are symmetrically expanded.  No focal consolidation, pleural effusion, or pneumothorax.  Suspect left basilar atelectasis. Osseous structures demonstrate no evidence for acute fracture or osseous destructive lesion. No free intra-abdominal air.     No acute intrathoracic abnormality. Electronically signed by: Bashir Ramos Date:    09/17/2024 Time:    11:24     Assessment:     1. Acute bacterial bronchitis    2. Other cough    3. Post-nasal drip    4. Nasal congestion    5. Chest congestion      Patient presents with clinical exam findings and history consistent with above.      On exam, patient is nontoxic appearing and vitals are stable.      Diagnostic testing results were reviewed and discussed with patient/guardian.   Tests ordered in clinic:XR CHEST PA AND LATERAL(Result Date: 9/17/2024):  No acute intrathoracic abnormality.     Previous progress notes/admissions/labs and medications were reviewed.    Plan:   Patient discharged before official x-ray reading; no pneumonia noted.     Acute bacterial bronchitis  -     dexAMETHasone injection 10 mg  -     albuterol (PROVENTIL/VENTOLIN HFA) 90 mcg/actuation inhaler; Inhale 2 puffs into the lungs every 4 (four) hours as needed for Wheezing or Shortness of Breath.  Dispense: 18 g; Refill: 0  -     azithromycin (Z-CLOVER) 250 MG tablet; Take 2 tablets by mouth on day 1; Take 1 tablet by mouth on days 2-5  Dispense: 6 tablet; Refill: 0  -     Ambulatory referral/consult to Internal Medicine    Other cough  -     SARS Coronavirus 2 Antigen, POCT Manual Read  -     azelastine-fluticasone (DYMISTA) 137-50 mcg/spray Spry nassal spray; 1 spray by Each Nostril route 2 (two) times daily as needed (runny nose and congestion).  Dispense: 23 g; Refill: 0  -     loratadine (CLARITIN) 10 mg  tablet; Take 1 tablet (10 mg total) by mouth daily as needed for Allergies.  Dispense: 30 tablet; Refill: 0  -     promethazine-dextromethorphan (PROMETHAZINE-DM) 6.25-15 mg/5 mL Syrp; Take 5 mLs by mouth every 6 (six) hours as needed (cough).  Dispense: 180 mL; Refill: 0  -     XR CHEST PA AND LATERAL; Future; Expected date: 09/17/2024  -     albuterol (PROVENTIL/VENTOLIN HFA) 90 mcg/actuation inhaler; Inhale 2 puffs into the lungs every 4 (four) hours as needed for Wheezing or Shortness of Breath.  Dispense: 18 g; Refill: 0  -     benzonatate (TESSALON) 100 MG capsule; Take 1 capsule (100 mg total) by mouth 3 (three) times daily as needed for Cough.  Dispense: 30 capsule; Refill: 0    Post-nasal drip  -     azelastine-fluticasone (DYMISTA) 137-50 mcg/spray Spry nassal spray; 1 spray by Each Nostril route 2 (two) times daily as needed (runny nose and congestion).  Dispense: 23 g; Refill: 0  -     loratadine (CLARITIN) 10 mg tablet; Take 1 tablet (10 mg total) by mouth daily as needed for Allergies.  Dispense: 30 tablet; Refill: 0    Nasal congestion  -     azelastine-fluticasone (DYMISTA) 137-50 mcg/spray Spry nassal spray; 1 spray by Each Nostril route 2 (two) times daily as needed (runny nose and congestion).  Dispense: 23 g; Refill: 0  -     loratadine (CLARITIN) 10 mg tablet; Take 1 tablet (10 mg total) by mouth daily as needed for Allergies.  Dispense: 30 tablet; Refill: 0    Chest congestion  -     XR CHEST PA AND LATERAL; Future; Expected date: 09/17/2024                    1) See orders for this visit as documented in the electronic medical record.  2) Symptomatic therapy suggested: use acetaminophen/ibuprofen every 6-8 hours prn pain or fever, push fluids.   3) Call or return to clinic prn if these symptoms worsen or fail to improve as anticipated.    Discussed results/diagnosis/plan with patient in clinic.  We had shared decision making for patient's treatment. Patient verbalized understanding and in  "agreement with current treatment plan.     Patient was instructed to return for re-evaluation with urgent care or PCP for continued outpatient workup and management if symptoms do not improve/worsening symptoms. Strict ED versus clinic precautions given in depth.    Discharge and follow-up instructions given verbally/printed with the patient who expressed understanding. The instructions and results are also available on [facility].              Gifty "Nemo" CYNDEE Renteria          Patient Instructions   Symptomatic care:  Control coughing: Promethazine-DM and tessalon  Lower swelling in your airways: decadron injection  Treat infection: azithromycin   Control extra mucus: Guaifenesin 400 mg every 4h prn  or 1200 mg every 12 hours prn sputum production. It is Mucinex DM 12 hour,.      Recommend oral antihistamine (claritin, zyrtec, allegra,xyzal)) +/- oral decongestant (pseudoephedrine)  for rhinorrhea/ear congestion  < 5 days if blood pressure, steroid nasal spray (flonase) +/- antihistamine nasal spray (azelastine)=dymista, prescription (promethazine-DM) at night due to drowsiness  /OTC cough medicine (Dayquil, robitussin DM, Mucinex DM 12 hour),  Tylenol (Acetaminophen) and/or Motrin (Ibuprofen) as directed for control of pain and/or fever.      Please drink plenty of fluids.  Please get plenty of rest.  Nasal irrigation with a saline spray or Netti Pot like device per their directions is also recommended.  If you  smoke, please stop smoking.    To help ease a sore throat, you can:  Use a sore throat spray.  Suck on hard candy or throat lozenges.  Gargle with warm saltwater a few times each day. Mix of 1/4 teaspoon (1.25 grams) salt in 8 ounces (240 mL) of warm water.  Use a cool mist humidifier to help you breathe easier.    If you negative (-) for a COVID test today and you are continuing to have symptoms, it is recommended to repeat the test in 48 hours x 3. If you continue to be negative, you may return to " school/work once you have improved symptoms and no fever for 24 hours without any medications. This applies to all viral illnesses.     Discussed prescriptions and over-the-counter medicines to help with patient's symptoms:  A steroid nose spray (flonase) and antihistamine nasal spray (azelastine) can help with a stuffy nose. It can also help with drainage down the back of your throat.  An antihistamine (loratadine,zyrtec,allegra, xyzal) can help with itching, sneezing, or runny nose.  An antihistamine eye drop can help with itchy eyes.  A decongestant (pseudoephedrine,  Phenylephrine, oxymetazoline aka afrin nasal spray) can help with a stuffy nose. Take <5 days for congestion and rhinorrhea. Once symptoms improve, proceed with loratadine/zyrtec once a day. These ingredients can keep you up all night, decrease appetite, feel jittery, and raise blood pressure with long term use.  OTC Coricidin can be used for patients with hypertension and palpitations because you cannot use ingredients such as pseudoephedrine and phenylephrine for oral decongestants.  Coricidin HBP Cough & Cold (Chlorpheniramine/Dextromethorphan)  Coricidin HBP Maximum Strength Multi-Symptom Flu  (Acetaminophen,Dextromethorphan, Chlorpheniramine)  Coricidin HBP® Maximum Strength Cold & Flu Day/Night (Acetaminophen,Dextromethorphan, Doxylamine, Guaifenesin)  Coricidin HBP Chest Congestion & Cough (Dextromethorphan + Guaifenesin)    Medications that control cough are suppressants and expectorants. Suppressants are tessalon pearls and dextromethorphan. If you have a productive cough with sputum, you need an expectorant called guaifenesin. Dextromethorphan and Guaifenesin are active ingredients in many OTC cough/cold medications such as Dayquil/Nyquil, Mucinex, and Robitussin Mucus+Chest Congestion.            Common Cold Medicine Ingredients Cheat sheet  Acetaminophen (APAP) -pain reliever/fever reducer  Dextromethorphan - cough  suppressant  Guaifenesin - expectorant/thins and loosens mucus  Phenylephrine - nasal decongestant  Diphenhydramine or Doxylamine succinate - antihistamine, helps you fall asleep  Promethazine or Brompheniramine - Prescription strength antihistamines    These OTC cold medications are safe to use if you do not have high blood pressure (hypertension) or palpitations.  DayQuil and NyQuil - Cough, Cold & Flu Relief LiquiCaps  DayQuil: Acetaminophen, Dextromethorphan, and Phenylephrine   NyQuil: Acetaminophen, Dextromethorphan, and Doxylamine  DayQuil and NyQuil SEVERE Maximum Strength Cough, Cold & Flu Relief LiquiCaps  DAYQUIL: Acetaminophen, Dextromethorphan, Guaifenesin, and Phenylephrine  NYQUIL: Acetaminophen, Dextromethorphan, Doxylamine, and Phenylephrine   Mucinex DM: Guaifenesin,Dextromethorphan  Mucinex Maximum Strength Sinus-Max® Pressure, Pain & Cough Liquid Gels: Acetaminophen/Dextromethorphan/ Guaifenesin/Phenylephrine     If not allergic, take Tylenol (Acetaminophen) 650 mg to  1 g every 6 hours as needed  and/or Motrin (Ibuprofen) 600 to 800 mg every 6 hours as needed for fever or pain.    Discussed adverse side effects of recurrent/long term steroid use: elevated blood pressure elevated blood sugar, pancreatitis,glaucoma/cataracts, weight gain in face/abdomen/neck, round face (moon face), fluid retention in legs/lungs, mood swings, upset stomach, increased risk of infections, osteoporosis and increased risk of fractures, fatigue, loss of appetite, nausea and muscle weakness, thin skin, bruising and slower wound healing.        Please remember that you have received care at an urgent care today. Urgent cares are not emergency rooms and are not equipped to handle life threatening emergencies and cannot rule in or out certain medical conditions and you may be released before all of your medical problems are known or treated.     Please arrange follow up with your primary care physician or speciality clinic  within 2-5 days if your signs and symptoms have not resolved or worsen.     Patient can call our Referral Hotline at [phone] to make an appointment.      Please return here or go to the Emergency Department for any concerns or worsening of condition.  Signs of infection. These include a fever of 100.4°F (38°C) or higher, chills, cough, more sputum or change in color of sputum.  You are having so much trouble breathing that you can only say one or two words at a time.  You need to sit upright at all times to be able to breathe and or cannot lie down.  You have trouble breathing when talking or sitting still.  You have a fever of 100.4°F (38°C) or higher or chills.  You have chest pain when you cough, have trouble breathing but can still talk in full sentences, or cough up blood.

## 2024-09-17 NOTE — PATIENT INSTRUCTIONS
Symptomatic care:  Control coughing: Promethazine-DM and tessalon  Lower swelling in your airways: decadron injection  Treat infection: azithromycin   Control extra mucus: Guaifenesin 400 mg every 4h prn  or 1200 mg every 12 hours prn sputum production. It is Mucinex DM 12 hour,.      Recommend oral antihistamine (claritin, zyrtec, allegra,xyzal)) +/- oral decongestant (pseudoephedrine)  for rhinorrhea/ear congestion  < 5 days if blood pressure, steroid nasal spray (flonase) +/- antihistamine nasal spray (azelastine)=dymista, prescription (promethazine-DM) at night due to drowsiness  /OTC cough medicine (Dayquil, robitussin DM, Mucinex DM 12 hour),  Tylenol (Acetaminophen) and/or Motrin (Ibuprofen) as directed for control of pain and/or fever.      Please drink plenty of fluids.  Please get plenty of rest.  Nasal irrigation with a saline spray or Netti Pot like device per their directions is also recommended.  If you  smoke, please stop smoking.    To help ease a sore throat, you can:  Use a sore throat spray.  Suck on hard candy or throat lozenges.  Gargle with warm saltwater a few times each day. Mix of 1/4 teaspoon (1.25 grams) salt in 8 ounces (240 mL) of warm water.  Use a cool mist humidifier to help you breathe easier.    If you negative (-) for a COVID test today and you are continuing to have symptoms, it is recommended to repeat the test in 48 hours x 3. If you continue to be negative, you may return to school/work once you have improved symptoms and no fever for 24 hours without any medications. This applies to all viral illnesses.     Discussed prescriptions and over-the-counter medicines to help with patient's symptoms:  A steroid nose spray (flonase) and antihistamine nasal spray (azelastine) can help with a stuffy nose. It can also help with drainage down the back of your throat.  An antihistamine (loratadine,zyrtec,allegra, xyzal) can help with itching, sneezing, or runny nose.  An antihistamine eye  drop can help with itchy eyes.  A decongestant (pseudoephedrine,  Phenylephrine, oxymetazoline aka afrin nasal spray) can help with a stuffy nose. Take <5 days for congestion and rhinorrhea. Once symptoms improve, proceed with loratadine/zyrtec once a day. These ingredients can keep you up all night, decrease appetite, feel jittery, and raise blood pressure with long term use.  OTC Coricidin can be used for patients with hypertension and palpitations because you cannot use ingredients such as pseudoephedrine and phenylephrine for oral decongestants.  Coricidin HBP Cough & Cold (Chlorpheniramine/Dextromethorphan)  Coricidin HBP Maximum Strength Multi-Symptom Flu  (Acetaminophen,Dextromethorphan, Chlorpheniramine)  Coricidin HBP® Maximum Strength Cold & Flu Day/Night (Acetaminophen,Dextromethorphan, Doxylamine, Guaifenesin)  Coricidin HBP Chest Congestion & Cough (Dextromethorphan + Guaifenesin)    Medications that control cough are suppressants and expectorants. Suppressants are tessalon pearls and dextromethorphan. If you have a productive cough with sputum, you need an expectorant called guaifenesin. Dextromethorphan and Guaifenesin are active ingredients in many OTC cough/cold medications such as Dayquil/Nyquil, Mucinex, and Robitussin Mucus+Chest Congestion.            Common Cold Medicine Ingredients Cheat sheet  Acetaminophen (APAP) -pain reliever/fever reducer  Dextromethorphan - cough suppressant  Guaifenesin - expectorant/thins and loosens mucus  Phenylephrine - nasal decongestant  Diphenhydramine or Doxylamine succinate - antihistamine, helps you fall asleep  Promethazine or Brompheniramine - Prescription strength antihistamines    These OTC cold medications are safe to use if you do not have high blood pressure (hypertension) or palpitations.  DayQuil and NyQuil - Cough, Cold & Flu Relief LiquiCaps  DayQuil: Acetaminophen, Dextromethorphan, and Phenylephrine   NyQuil: Acetaminophen, Dextromethorphan, and  Doxylamine  DayQuil and NyQuil SEVERE Maximum Strength Cough, Cold & Flu Relief LiquiCaps  DAYQUIL: Acetaminophen, Dextromethorphan, Guaifenesin, and Phenylephrine  NYQUIL: Acetaminophen, Dextromethorphan, Doxylamine, and Phenylephrine   Mucinex DM: Guaifenesin,Dextromethorphan  Mucinex Maximum Strength Sinus-Max® Pressure, Pain & Cough Liquid Gels: Acetaminophen/Dextromethorphan/ Guaifenesin/Phenylephrine     If not allergic, take Tylenol (Acetaminophen) 650 mg to  1 g every 6 hours as needed  and/or Motrin (Ibuprofen) 600 to 800 mg every 6 hours as needed for fever or pain.    Discussed adverse side effects of recurrent/long term steroid use: elevated blood pressure elevated blood sugar, pancreatitis,glaucoma/cataracts, weight gain in face/abdomen/neck, round face (moon face), fluid retention in legs/lungs, mood swings, upset stomach, increased risk of infections, osteoporosis and increased risk of fractures, fatigue, loss of appetite, nausea and muscle weakness, thin skin, bruising and slower wound healing.        Please remember that you have received care at an urgent care today. Urgent cares are not emergency rooms and are not equipped to handle life threatening emergencies and cannot rule in or out certain medical conditions and you may be released before all of your medical problems are known or treated.     Please arrange follow up with your primary care physician or speciality clinic within 2-5 days if your signs and symptoms have not resolved or worsen.     Patient can call our Referral Hotline at [phone] to make an appointment.      Please return here or go to the Emergency Department for any concerns or worsening of condition.  Signs of infection. These include a fever of 100.4°F (38°C) or higher, chills, cough, more sputum or change in color of sputum.  You are having so much trouble breathing that you can only say one or two words at a time.  You need to sit upright at all times to be able to  breathe and or cannot lie down.  You have trouble breathing when talking or sitting still.  You have a fever of 100.4°F (38°C) or higher or chills.  You have chest pain when you cough, have trouble breathing but can still talk in full sentences, or cough up blood.

## 2024-10-08 DIAGNOSIS — G89.29 CHRONIC BILATERAL LOW BACK PAIN WITHOUT SCIATICA: ICD-10-CM

## 2024-10-08 DIAGNOSIS — M54.50 CHRONIC BILATERAL LOW BACK PAIN WITHOUT SCIATICA: ICD-10-CM

## 2024-10-08 NOTE — TELEPHONE ENCOUNTER
----- Message from Henok sent at 10/8/2024  2:45 PM CDT -----  Contact: Pt [phone]  Requesting an RX refill or new RX.    Is this a refill or new RX: refill    RX name and strength (copy/paste from chart):  oxyCODONE-acetaminophen (PERCOCET)  mg per tablet    Is this a 30 day or 90 day RX:     Pharmacy name and phone # (copy/paste from chart):  [facility]   Phone: [phone]  Fax: [phone]          The doctors have asked that we provide their patients with the following 2 reminders -- prescription refills can take up to 72 hours, and a friendly reminder that in the future you can use your [facility] account to request refills: yes

## 2024-10-08 NOTE — TELEPHONE ENCOUNTER
No care due was identified.  [employer] Embedded Care Due Messages. Reference number: [id number].   10/08/2024 2:48:57 PM CDT

## 2024-10-10 ENCOUNTER — TELEPHONE (OUTPATIENT)
Dept: PRIMARY CARE CLINIC | Facility: CLINIC | Age: 54
End: 2024-10-10
Payer: COMMERCIAL

## 2024-10-10 RX ORDER — OXYCODONE AND ACETAMINOPHEN 10; 325 MG/1; MG/1
1 TABLET ORAL EVERY 4 HOURS PRN
Qty: 21 TABLET | Refills: 0 | Status: SHIPPED | OUTPATIENT
Start: 2024-10-10

## 2024-10-10 NOTE — TELEPHONE ENCOUNTER
----- Message from Pippa sent at 10/10/2024  9:47 AM CDT -----  Contact: [phone]  Requesting an RX refill or new RX.    Is this a refill or new RX:     RX name and strength (oxyCODONE-acetaminophen (PERCOCET)  mg per tablet    Is this a 30 day or 90 day RX:     Pharmacy name and phone # (      [facility]  [address]  [place] [address]  Phone: [phone] Fax: [phone]

## 2024-10-21 ENCOUNTER — OFFICE VISIT (OUTPATIENT)
Dept: PRIMARY CARE CLINIC | Facility: CLINIC | Age: 54
End: 2024-10-21
Payer: COMMERCIAL

## 2024-10-21 ENCOUNTER — LAB VISIT (OUTPATIENT)
Dept: LAB | Facility: HOSPITAL | Age: 54
End: 2024-10-21
Attending: INTERNAL MEDICINE
Payer: COMMERCIAL

## 2024-10-21 VITALS
HEART RATE: 82 BPM | OXYGEN SATURATION: 97 % | BODY MASS INDEX: 43.69 KG/M2 | SYSTOLIC BLOOD PRESSURE: 136 MMHG | HEIGHT: 64 IN | WEIGHT: 255.94 LBS | DIASTOLIC BLOOD PRESSURE: 74 MMHG

## 2024-10-21 DIAGNOSIS — G89.29 CHRONIC BILATERAL LOW BACK PAIN WITHOUT SCIATICA: Primary | ICD-10-CM

## 2024-10-21 DIAGNOSIS — Z79.891 ENCOUNTER FOR MONITORING OPIOID MAINTENANCE THERAPY: ICD-10-CM

## 2024-10-21 DIAGNOSIS — E66.01 MORBID OBESITY: ICD-10-CM

## 2024-10-21 DIAGNOSIS — M54.50 CHRONIC BILATERAL LOW BACK PAIN WITHOUT SCIATICA: Primary | ICD-10-CM

## 2024-10-21 DIAGNOSIS — Z51.81 ENCOUNTER FOR MONITORING OPIOID MAINTENANCE THERAPY: ICD-10-CM

## 2024-10-21 DIAGNOSIS — K21.9 GERD WITHOUT ESOPHAGITIS: ICD-10-CM

## 2024-10-21 LAB
AMPHET+METHAMPHET UR QL: NEGATIVE
BARBITURATES UR QL SCN>200 NG/ML: NEGATIVE
BENZODIAZ UR QL SCN>200 NG/ML: NEGATIVE
BZE UR QL SCN: NEGATIVE
CANNABINOIDS UR QL SCN: NEGATIVE
CREAT UR-MCNC: 245 MG/DL (ref 15–325)
ETHANOL UR-MCNC: <10 MG/DL
METHADONE UR QL SCN>300 NG/ML: NEGATIVE
OPIATES UR QL SCN: ABNORMAL
PCP UR QL SCN>25 NG/ML: NEGATIVE
TOXICOLOGY INFORMATION: ABNORMAL

## 2024-10-21 PROCEDURE — 3075F SYST BP GE 130 - 139MM HG: CPT | Mod: CPTII,S$GLB,, | Performed by: INTERNAL MEDICINE

## 2024-10-21 PROCEDURE — 80307 DRUG TEST PRSMV CHEM ANLYZR: CPT | Performed by: INTERNAL MEDICINE

## 2024-10-21 PROCEDURE — 99214 OFFICE O/P EST MOD 30 MIN: CPT | Mod: S$GLB,,, | Performed by: INTERNAL MEDICINE

## 2024-10-21 PROCEDURE — 99999 PR PBB SHADOW E&M-EST. PATIENT-LVL IV: CPT | Mod: PBBFAC,,, | Performed by: INTERNAL MEDICINE

## 2024-10-21 PROCEDURE — 1159F MED LIST DOCD IN RCRD: CPT | Mod: CPTII,S$GLB,, | Performed by: INTERNAL MEDICINE

## 2024-10-21 PROCEDURE — 3044F HG A1C LEVEL LT 7.0%: CPT | Mod: CPTII,S$GLB,, | Performed by: INTERNAL MEDICINE

## 2024-10-21 PROCEDURE — 3008F BODY MASS INDEX DOCD: CPT | Mod: CPTII,S$GLB,, | Performed by: INTERNAL MEDICINE

## 2024-10-21 PROCEDURE — 3078F DIAST BP <80 MM HG: CPT | Mod: CPTII,S$GLB,, | Performed by: INTERNAL MEDICINE

## 2024-10-21 NOTE — PROGRESS NOTES
[facility] Note    Chief Complaint      Chief Complaint   Patient presents with    Follow-up       History of Present Illness      History of Present Illness    CHIEF COMPLAINT:  Ms. Yousif presents today for follow up.    RECENT RESPIRATORY ISSUES:  She reports a recent urgent care visit due to a persistent cough with postnasal drip, which has since improved. She was prescribed an asthma pump, which she has not needed to use. She received steroid treatment and was prescribed promethazine, which she took once and effectively suppressed her cough. She notes significant improvement in both her cough and mucus production, which had been ongoing issues for years. She acknowledges that the steroid treatment was likely beneficial for her condition.    MEDICATION MANAGEMENT:  She reports recently having medications refilled and is unsure if any additional refills are needed at this time.      ROS:  Respiratory: -cough       Chronic low back pain: Controlling with percocet and robaxin. Reports current regimen is not lasting long enough.  Discussed the use of chronic opiates and building dependence/tolerance.  Referring to pain management to evaluate for other strategies for pain control.     Assessment/Plan     Indira Yousif is a 54 y.o.female with:    Assessment & Plan    Assessed patient's recent urgent care visit for persistent cough  Agreed with urgent care's treatment plan of steroids and cough suppressant  Noted improvement in patient's postnasal drip and mucus production following treatment  Considered recent bloodwork from July for potential use in wellness check    ASTHMA:  - Continued asthma inhaler (patient reports not needing to use it).    COUGH:  - Continued promethazine (cough suppressant) as needed.    LABS:  - Ordered urine specimen.  - Deferred labs, potentially to be ordered based on wellness check form requirements.    FOLLOW UP:  - Follow up to review wellness check form to  determine if July bloodwork can be used or if new bloodwork is needed.  - Contact the office if new bloodwork is required; will write order on form and provide copy to patient.  - Nadine to fax completed form if additional bloodwork is needed.         1. Encounter for monitoring opioid maintenance therapy  - Toxicology screen, urine; Future    2. Chronic bilateral low back pain without sciatica    3. GERD without esophagitis      Chronic conditions status updated as per HPI.  Other than changes above, cont current medications and maintain follow up with specialists.  No follow-ups on file.    Future Appointments   Date Time Provider Department Center   2025  2:30 PM Kendrick Stanley MD [facility]           Past Medical History:  Past Medical History:   Diagnosis Date    Anxiety        Past Surgical History:   has a past surgical history that includes Tubal ligation;  section; Hernia repair; Cyst Removal (Left, 1980); and Hysterectomy.    Family History:  family history includes Diabetes in her mother; Hypertension in her father and mother; Kidney disease in her mother; No Known Problems in her brother, brother, brother, brother, sister, son, and son.     Social History:  Social History     Tobacco Use    Smoking status: Never     Passive exposure: Never    Smokeless tobacco: Never   Substance Use Topics    Alcohol use: Yes     Comment: occasionally    Drug use: No       Medications:  Outpatient Encounter Medications as of 10/21/2024   Medication Sig Dispense Refill    albuterol (PROVENTIL/VENTOLIN HFA) 90 mcg/actuation inhaler Inhale 2 puffs into the lungs every 4 (four) hours as needed for Wheezing or Shortness of Breath. 18 g 0    ALPRAZolam (XANAX) 0.5 MG tablet Take 1 tablet (0.5 mg total) by mouth 3 (three) times daily as needed for Anxiety. 21 tablet 0    ammonium lactate 12 % Crea Apply small amount to feet except for in between toes twice a day 140 g 6    []  "azelastine-fluticasone (DYMISTA) 137-50 mcg/spray Spry nassal spray 1 spray by Each Nostril route 2 (two) times daily as needed (runny nose and congestion). 23 g 0    [] azithromycin (Z-CLOVER) 250 MG tablet Take 2 tablets by mouth on day 1; Take 1 tablet by mouth on days 2-5 6 tablet 0    [] benzonatate (TESSALON) 100 MG capsule Take 1 capsule (100 mg total) by mouth 3 (three) times daily as needed for Cough. 30 capsule 0    cetirizine (ZYRTEC) 10 MG tablet Take 1 tablet (10 mg total) by mouth 2 (two) times a day. for 7 days 14 tablet 0    diclofenac sodium (VOLTAREN) 1 % Gel Apply 2 g topically 4 (four) times daily. 100 g 0    ibuprofen (ADVIL,MOTRIN) 800 MG tablet Take 1 tablet (800 mg total) by mouth 3 (three) times daily. 90 tablet 1    loratadine (CLARITIN) 10 mg tablet Take 1 tablet (10 mg total) by mouth daily as needed for Allergies. 30 tablet 0    naloxone (NARCAN) 4 mg/actuation Spry 4mg by nasal route as needed for opioid overdose; may repeat every 2-3 minutes in alternating nostrils until medical help arrives. Call 911 (Patient not taking: Reported on 2024) 2 each 11    oxyCODONE-acetaminophen (PERCOCET)  mg per tablet Take 1 tablet by mouth every 4 (four) hours as needed for Pain. 21 tablet 0    pantoprazole (PROTONIX) 40 MG tablet Take 1 tablet (40 mg total) by mouth once daily. for 14 days 14 tablet 0    pen needle, diabetic (PEN NEEDLE) 32 gauge x 5/32" Ndle Use as directed for semaglutide injection (Patient not taking: Reported on 2024) 100 each 1    [] promethazine-dextromethorphan (PROMETHAZINE-DM) 6.25-15 mg/5 mL Syrp Take 5 mLs by mouth every 6 (six) hours as needed (cough). 180 mL 0    traZODone (DESYREL) 50 MG tablet Take 1 tablet (50 mg total) by mouth every evening. (Patient not taking: Reported on 2024) 30 tablet 11    triamterene-hydrochlorothiazide 37.5-25 mg (MAXZIDE-25) 37.5-25 mg per tablet Take 1 tablet by mouth daily as needed (leg swelling). " "90 tablet 3    [DISCONTINUED] oxyCODONE-acetaminophen (PERCOCET)  mg per tablet Take 1 tablet by mouth every 4 (four) hours as needed for Pain. 21 tablet 0     No facility-administered encounter medications on file as of 10/21/2024.       Allergies:  Review of patient's allergies indicates:  No Known Allergies    Health Maintenance:  Immunization History   Administered Date(s) Administered    COVID-19, MRNA, LN-S, PF (MODERNA FULL 0.5 ML DOSE) 03/16/2021, 04/13/2021    DTP 11/28/1973, 01/31/1974, 03/23/1977, 08/31/1977    Influenza - Quadrivalent 10/19/2016    Influenza - Quadrivalent - PF *Preferred* (6 months and older) 10/02/2017, 10/17/2018    MMR 01/31/1974    OPV 11/28/1973, 01/31/1974, 03/23/1977, 08/31/1977      Health Maintenance   Topic Date Due    TETANUS VACCINE  Never done    Shingles Vaccine (1 of 2) Never done    Mammogram  05/31/2025    Colorectal Cancer Screening  07/04/2025    Lipid Panel  07/11/2029    Hepatitis C Screening  Completed        Physical Exam      Vital Signs  Pulse: 82  SpO2: 97 %  BP: 136/74  BP Location: Left arm  Patient Position: Sitting  Pain Score: 0-No pain  Height and Weight  Height: 5' 4" (162.6 cm)  Weight: 116.1 kg (255 lb 15.3 oz)  BSA (Calculated - sq m): 2.29 sq meters  BMI (Calculated): 43.9  Weight in (lb) to have BMI = 25: 145.3]    Physical Exam    General: No acute distress. Well-developed. Well-nourished.  Eyes: EOMI. Sclerae anicteric.  HENT: Normocephalic. Atraumatic. Nares patent. Moist oral mucosa.  Ears: Bilateral TMs clear. Bilateral EACs clear.  Cardiovascular: Regular rate. Regular rhythm. No murmurs. No rubs. No gallops. Normal S1, S2.  Respiratory: Normal respiratory effort. Clear to auscultation bilaterally. No rales. No rhonchi. No wheezing. Normal lung sounds.  Abdomen: Soft. Non-tender. Non-distended. Normoactive bowel sounds.  Musculoskeletal: No  obvious deformity.  Extremities: No lower extremity edema.  Neurological: Alert & oriented x3. No " slurred speech. Normal gait.  Psychiatric: Normal mood. Normal affect. Good insight. Good judgment.  Skin: Warm. Dry. No rash.         Physical Exam  Vitals reviewed.   Constitutional:       Appearance: She is well-developed.   HENT:      Head: Normocephalic and atraumatic.      Right Ear: External ear normal.      Left Ear: External ear normal.   Cardiovascular:      Rate and Rhythm: Normal rate and regular rhythm.      Heart sounds: Normal heart sounds. No murmur heard.  Pulmonary:      Effort: Pulmonary effort is normal.      Breath sounds: Normal breath sounds. No wheezing or rales.   Abdominal:      General: Bowel sounds are normal. There is no distension.      Palpations: Abdomen is soft.      Tenderness: There is no abdominal tenderness.         Laboratory:    Results              CBC:  Recent Labs   Lab 08/11/22 0618 04/14/23 0818 07/11/24  1224   WBC 6.32 5.15 5.63   RBC 4.78 4.53 4.57   Hemoglobin 12.3 11.6 L 11.8 L   Hematocrit 37.8 37.8 38.6   Platelets 255 237 214   MCV 79 L 83 85   MCH 25.7 L 25.6 L 25.8 L   MCHC 32.5 30.7 L 30.6 L     CMP:  Recent Labs   Lab 08/11/22 0618 04/14/23 0818 07/11/24  1224   Glucose 105 86 81   Calcium 8.7 9.3 9.0   Albumin 4.1 3.6 3.5   Total Protein 7.9 7.4 7.3   Sodium 138 139 141   Potassium 4.2 3.9 3.9   CO2 29 25 30 H   Chloride 99 105 106   BUN 9 17 11   Alkaline Phosphatase 81 59 69   ALT 12 7 L 8 L   AST 19 11 13   Total Bilirubin 1.3 H 0.7 0.5     URINALYSIS:       LIPIDS:  Recent Labs   Lab 08/11/22 0618 04/14/23 0818 07/11/24  1224   TSH 1.050 0.378 L 0.373 L   HDL 81 H 66 61   Cholesterol 207 H 198 184   Triglycerides 46 44 67   LDL Cholesterol 116.8 123.2 109.6   HDL/Cholesterol Ratio 39.1 33.3 33.2   Non-HDL Cholesterol 126 132 123   Total Cholesterol/HDL Ratio 2.6 3.0 3.0     TSH:  Recent Labs   Lab 08/11/22 0618 04/14/23 0818 07/11/24  1224   TSH 1.050 0.378 L 0.373 L     A1C:  Recent Labs   Lab 08/11/22  0618 04/14/23  0818 07/11/24  1222    Hemoglobin A1C 5.4 5.3 5.7 H           This note was generated with the assistance of ambient listening technology. Verbal consent was obtained by the patient and accompanying visitor(s) for the recording of patient appointment to facilitate this note. I attest to having reviewed and edited the generated note for accuracy, though some syntax or spelling errors may persist. Please contact the author of this note for any clarification.      Kendrick Stanley MD  [facility]

## 2024-11-08 DIAGNOSIS — M54.50 CHRONIC BILATERAL LOW BACK PAIN WITHOUT SCIATICA: ICD-10-CM

## 2024-11-08 DIAGNOSIS — G89.29 CHRONIC BILATERAL LOW BACK PAIN WITHOUT SCIATICA: ICD-10-CM

## 2024-11-08 NOTE — TELEPHONE ENCOUNTER
----- Message from Henrique sent at 11/8/2024 12:10 PM CST -----  Contact: [phone]  Requesting an RX refill or new RX.    Is this a refill or new RX: Refill    RX name and strength (copy/paste from chart):  oxyCODONE-acetaminophen (PERCOCET)  mg per tablet    Is this a 30 day or 90 day RX:     Pharmacy name and phone # (copy/paste from chart):    [facility]  [address] [address]  [place] [address]  Phone: [phone] Fax: [phone]      The doctors have asked that we provide their patients with the following 2 reminders -- prescription refills can take up to 72 hours, and a friendly reminder that in the future you can use your [facility] account to request refills: call back

## 2024-11-08 NOTE — TELEPHONE ENCOUNTER
No care due was identified.  [facility] Embedded Care Due Messages. Reference number: [id number].   11/08/2024 1:17:48 PM CST

## 2024-11-11 RX ORDER — OXYCODONE AND ACETAMINOPHEN 10; 325 MG/1; MG/1
1 TABLET ORAL EVERY 4 HOURS PRN
Qty: 21 TABLET | Refills: 0 | Status: SHIPPED | OUTPATIENT
Start: 2024-11-11

## 2024-12-09 DIAGNOSIS — G89.29 CHRONIC BILATERAL LOW BACK PAIN WITHOUT SCIATICA: ICD-10-CM

## 2024-12-09 DIAGNOSIS — M54.50 CHRONIC BILATERAL LOW BACK PAIN WITHOUT SCIATICA: ICD-10-CM

## 2024-12-09 RX ORDER — OXYCODONE AND ACETAMINOPHEN 10; 325 MG/1; MG/1
1 TABLET ORAL EVERY 4 HOURS PRN
Qty: 21 TABLET | Refills: 0 | Status: SHIPPED | OUTPATIENT
Start: 2024-12-09

## 2024-12-09 NOTE — TELEPHONE ENCOUNTER
No care due was identified.  [facility] Embedded Care Due Messages. Reference number: [id number].   12/09/2024 10:23:48 AM CST

## 2024-12-09 NOTE — TELEPHONE ENCOUNTER
----- Message from Henok sent at 12/9/2024 10:19 AM CST -----  Contact: Pt  [phone]  Requesting an RX refill or new RX.    Is this a refill or new RX: refill    RX name and strength (copy/paste from chart):  oxyCODONE-acetaminophen (PERCOCET)  mg per tablet    Is this a 30 day or 90 day RX:     Pharmacy name and phone # (copy/paste from chart):  [facility]   Phone: [phone]  Fax: [phone]      The doctors have asked that we provide their patients with the following 2 reminders -- prescription refills can take up to 72 hours, and a friendly reminder that in the future you can use your [facility] account to request refills: yes

## 2025-01-07 DIAGNOSIS — M54.50 CHRONIC BILATERAL LOW BACK PAIN WITHOUT SCIATICA: ICD-10-CM

## 2025-01-07 DIAGNOSIS — G89.29 CHRONIC BILATERAL LOW BACK PAIN WITHOUT SCIATICA: ICD-10-CM

## 2025-01-07 RX ORDER — OXYCODONE AND ACETAMINOPHEN 10; 325 MG/1; MG/1
1 TABLET ORAL EVERY 4 HOURS PRN
Qty: 21 TABLET | Refills: 0 | Status: SHIPPED | OUTPATIENT
Start: 2025-01-07

## 2025-01-07 NOTE — TELEPHONE ENCOUNTER
No care due was identified.  [facility] Embedded Care Due Messages. Reference number: [id number].   1/07/2025 12:54:00 PM CST

## 2025-01-07 NOTE — TELEPHONE ENCOUNTER
----- Message from Livier sent at 1/7/2025 12:42 PM CST -----  Contact: Patient  [phone]  Requesting an RX refill or new RX.    Is this a refill or new RX: REFILL    RX name and strength (copy/paste from chart): oxyCODONE-acetaminophen (PERCOCET)  mg     Is this a 30 day or 90 day RX:     Pharmacy name and phone # (copy/paste from chart): [facility]                                                                                                        [address]                                                                                                       [phone]    The doctors have asked that we provide their patients with the following 2 reminders -- prescription refills can take up to 72 hours, and a friendly reminder that in the future you can use your [facility] account to request refills:

## 2025-02-07 DIAGNOSIS — M54.50 CHRONIC BILATERAL LOW BACK PAIN WITHOUT SCIATICA: ICD-10-CM

## 2025-02-07 DIAGNOSIS — G89.29 CHRONIC BILATERAL LOW BACK PAIN WITHOUT SCIATICA: ICD-10-CM

## 2025-02-07 RX ORDER — OXYCODONE AND ACETAMINOPHEN 10; 325 MG/1; MG/1
1 TABLET ORAL EVERY 4 HOURS PRN
Qty: 21 TABLET | Refills: 0 | Status: SHIPPED | OUTPATIENT
Start: 2025-02-07

## 2025-02-07 NOTE — TELEPHONE ENCOUNTER
No care due was identified.  [facility] Embedded Care Due Messages. Reference number: [id number].   2/07/2025 2:09:54 PM CST

## 2025-02-07 NOTE — TELEPHONE ENCOUNTER
----- Message from Phyllis sent at 2/7/2025  2:05 PM CST -----  Contact: [phone] Patient  Requesting an RX refill or new RX.    Is this a refill or new RX: refill    RX name and strength (copy/paste from chart):  oxyCODONE-acetaminophen (PERCOCET)  mg per tablet    Is this a 30 day or 90 day RX: 30    Pharmacy name and phone # (copy/paste from chart):   [facility]  [address]  [place] [address]  Phone: [phone] Fax: [phone]      The doctors have asked that we provide their patients with the following 2 reminders -- prescription refills can take up to 72 hours, and a friendly reminder that in the future you can use your [facility] account to request refills: yes

## 2025-02-26 ENCOUNTER — LAB VISIT (OUTPATIENT)
Dept: LAB | Facility: HOSPITAL | Age: 55
End: 2025-02-26
Attending: INTERNAL MEDICINE
Payer: COMMERCIAL

## 2025-02-26 ENCOUNTER — OFFICE VISIT (OUTPATIENT)
Dept: PRIMARY CARE CLINIC | Facility: CLINIC | Age: 55
End: 2025-02-26
Payer: COMMERCIAL

## 2025-02-26 VITALS
BODY MASS INDEX: 44.79 KG/M2 | HEIGHT: 64 IN | DIASTOLIC BLOOD PRESSURE: 68 MMHG | OXYGEN SATURATION: 96 % | HEART RATE: 82 BPM | WEIGHT: 262.38 LBS | SYSTOLIC BLOOD PRESSURE: 126 MMHG

## 2025-02-26 DIAGNOSIS — G89.29 CHRONIC BILATERAL LOW BACK PAIN WITHOUT SCIATICA: Primary | ICD-10-CM

## 2025-02-26 DIAGNOSIS — Z51.81 ENCOUNTER FOR MONITORING OPIOID MAINTENANCE THERAPY: ICD-10-CM

## 2025-02-26 DIAGNOSIS — M54.50 CHRONIC BILATERAL LOW BACK PAIN WITHOUT SCIATICA: ICD-10-CM

## 2025-02-26 DIAGNOSIS — M54.50 CHRONIC BILATERAL LOW BACK PAIN WITHOUT SCIATICA: Primary | ICD-10-CM

## 2025-02-26 DIAGNOSIS — S39.012D LUMBAR STRAIN, SUBSEQUENT ENCOUNTER: ICD-10-CM

## 2025-02-26 DIAGNOSIS — G89.29 CHRONIC BILATERAL LOW BACK PAIN WITHOUT SCIATICA: ICD-10-CM

## 2025-02-26 DIAGNOSIS — Z79.891 ENCOUNTER FOR MONITORING OPIOID MAINTENANCE THERAPY: ICD-10-CM

## 2025-02-26 DIAGNOSIS — E66.01 MORBID OBESITY: ICD-10-CM

## 2025-02-26 DIAGNOSIS — S50.01XD CONTUSION OF RIGHT ELBOW, SUBSEQUENT ENCOUNTER: ICD-10-CM

## 2025-02-26 DIAGNOSIS — S96.911D RIGHT FOOT STRAIN, SUBSEQUENT ENCOUNTER: ICD-10-CM

## 2025-02-26 DIAGNOSIS — K21.9 GERD WITHOUT ESOPHAGITIS: ICD-10-CM

## 2025-02-26 DIAGNOSIS — S46.911D STRAIN OF RIGHT SHOULDER, SUBSEQUENT ENCOUNTER: ICD-10-CM

## 2025-02-26 DIAGNOSIS — S96.911D RIGHT ANKLE STRAIN, SUBSEQUENT ENCOUNTER: ICD-10-CM

## 2025-02-26 LAB
AMPHET+METHAMPHET UR QL: NEGATIVE
BARBITURATES UR QL SCN>200 NG/ML: NEGATIVE
BENZODIAZ UR QL SCN>200 NG/ML: NEGATIVE
BZE UR QL SCN: NEGATIVE
CANNABINOIDS UR QL SCN: NEGATIVE
CREAT UR-MCNC: 243 MG/DL (ref 15–325)
ETHANOL UR-MCNC: <10 MG/DL
METHADONE UR QL SCN>300 NG/ML: NEGATIVE
OPIATES UR QL SCN: ABNORMAL
PCP UR QL SCN>25 NG/ML: NEGATIVE
TOXICOLOGY INFORMATION: ABNORMAL

## 2025-02-26 PROCEDURE — 3008F BODY MASS INDEX DOCD: CPT | Mod: CPTII,S$GLB,, | Performed by: INTERNAL MEDICINE

## 2025-02-26 PROCEDURE — 99214 OFFICE O/P EST MOD 30 MIN: CPT | Mod: S$GLB,,, | Performed by: INTERNAL MEDICINE

## 2025-02-26 PROCEDURE — 1159F MED LIST DOCD IN RCRD: CPT | Mod: CPTII,S$GLB,, | Performed by: INTERNAL MEDICINE

## 2025-02-26 PROCEDURE — 3078F DIAST BP <80 MM HG: CPT | Mod: CPTII,S$GLB,, | Performed by: INTERNAL MEDICINE

## 2025-02-26 PROCEDURE — 80307 DRUG TEST PRSMV CHEM ANLYZR: CPT | Performed by: INTERNAL MEDICINE

## 2025-02-26 PROCEDURE — 3074F SYST BP LT 130 MM HG: CPT | Mod: CPTII,S$GLB,, | Performed by: INTERNAL MEDICINE

## 2025-02-26 PROCEDURE — 99999 PR PBB SHADOW E&M-EST. PATIENT-LVL IV: CPT | Mod: PBBFAC,,, | Performed by: INTERNAL MEDICINE

## 2025-02-26 RX ORDER — IBUPROFEN 800 MG/1
800 TABLET ORAL 3 TIMES DAILY
Qty: 90 TABLET | Refills: 1 | Status: SHIPPED | OUTPATIENT
Start: 2025-02-26

## 2025-02-26 NOTE — PROGRESS NOTES
[facility] Note    Chief Complaint      Chief Complaint   Patient presents with    Follow-up     3 month        History of Present Illness      History of Present Illness    CHIEF COMPLAINT:  Ms. Yousif presents today for medication follow up    PAIN MANAGEMENT:  She takes ibuprofen once or twice weekly and received pain medication approximately 2 weeks ago.         Assessment/Plan     Indira Yousif is a 54 y.o.female with:    Assessment & Plan    Reviewed patient's current medication regimen, focusing on pain management with ibuprofen  Determined need for routine urine test to comply with pain medication monitoring protocols  Assessed overall health status through brief check-up    MORBID OBESITY:  - Signed an [facility] wellness form, indicating no medical contraindications for the patient's participation in wellness activities.         1. Chronic bilateral low back pain without sciatica  - Toxicology screen, urine; Future    2. Encounter for monitoring opioid maintenance therapy  - Toxicology screen, urine; Future    3. GERD without esophagitis    4. Lumbar strain, subsequent encounter  - ibuprofen (ADVIL,MOTRIN) 800 MG tablet; Take 1 tablet (800 mg total) by mouth 3 (three) times daily.  Dispense: 90 tablet; Refill: 1    5. Contusion of right elbow, subsequent encounter  - ibuprofen (ADVIL,MOTRIN) 800 MG tablet; Take 1 tablet (800 mg total) by mouth 3 (three) times daily.  Dispense: 90 tablet; Refill: 1    6. Strain of right shoulder, subsequent encounter  - ibuprofen (ADVIL,MOTRIN) 800 MG tablet; Take 1 tablet (800 mg total) by mouth 3 (three) times daily.  Dispense: 90 tablet; Refill: 1    7. Right ankle strain, subsequent encounter  - ibuprofen (ADVIL,MOTRIN) 800 MG tablet; Take 1 tablet (800 mg total) by mouth 3 (three) times daily.  Dispense: 90 tablet; Refill: 1    8. Right foot strain, subsequent encounter  - ibuprofen (ADVIL,MOTRIN) 800 MG tablet; Take 1 tablet (800 mg total) by  mouth 3 (three) times daily.  Dispense: 90 tablet; Refill: 1      Chronic conditions status updated as per HPI.  Other than changes above, cont current medications and maintain follow up with specialists.  No follow-ups on file.    Future Appointments   Date Time Provider Department Center   2025  2:30 PM Kendrick Stanley MD [facility]           Past Medical History:  Past Medical History:   Diagnosis Date    Anxiety        Past Surgical History:   has a past surgical history that includes Tubal ligation;  section; Hernia repair; Cyst Removal (Left, 1980); and Hysterectomy.    Family History:  family history includes Diabetes in her mother; Hypertension in her father and mother; Kidney disease in her mother; No Known Problems in her brother, brother, brother, brother, sister, son, and son.     Social History:  Social History[1]    Medications:  Encounter Medications[2]    Allergies:  Review of patient's allergies indicates:  No Known Allergies    Health Maintenance:  Immunization History   Administered Date(s) Administered    COVID-19, MRNA, LN-S, PF (MODERNA FULL 0.5 ML DOSE) 2021, 2021    DTP 1973, 1974, 1977, 1977    Influenza - Quadrivalent 10/19/2016    Influenza - Quadrivalent - PF *Preferred* (6 months and older) 10/02/2017, 10/17/2018    MMR 1974    OPV 1973, 1974, 1977, 1977      Health Maintenance   Topic Date Due    TETANUS VACCINE  Never done    Sign Pain Contract  Never done    Complete Opioid Risk Tool  Never done    Naloxone Prescription  Never done    Shingles Vaccine (1 of 2) Never done    Pneumococcal Vaccines (Age 50+) (1 of 1 - PCV) Never done    Influenza Vaccine (1) 2024    COVID-19 Vaccine (3 -  season) 2024    Mammogram  2025    Colorectal Cancer Screening  2025    Hemoglobin A1c (Prediabetes)  2025    Urine Drug Screen  2025    Lipid Panel  2029     "RSV Vaccine (Age 60+ and Pregnant patients) (1 - 1-dose 75+ series) 09/27/2047    Hepatitis C Screening  Completed    HIV Screening  Completed        Physical Exam      Vital Signs  Pulse: 82  SpO2: 96 %  BP: 126/68  BP Location: Right arm  Patient Position: Sitting  Pain Score: 0-No pain  Height and Weight  Height: 5' 4" (162.6 cm)  Weight: 119 kg (262 lb 5.6 oz)  BSA (Calculated - sq m): 2.32 sq meters  BMI (Calculated): 45  Weight in (lb) to have BMI = 25: 145.3]    Physical Exam    General: No acute distress. Well-developed. Well-nourished.  Eyes: EOMI. Sclerae anicteric.  HENT: Normocephalic. Atraumatic. Nares patent. Moist oral mucosa.  Ears: Bilateral TMs clear. Bilateral EACs clear.  Cardiovascular: Regular rate. Regular rhythm. No murmurs. No rubs. No gallops. Normal S1, S2.  Respiratory: Normal respiratory effort. Clear to auscultation bilaterally. No rales. No rhonchi. No wheezing.  Abdomen: Soft. Non-tender. Non-distended. Normoactive bowel sounds.  Musculoskeletal: No  obvious deformity.  Extremities: No lower extremity edema.  Neurological: Alert & oriented x3. No slurred speech. Normal gait.  Psychiatric: Normal mood. Normal affect. Good insight. Good judgment.  Skin: Warm. Dry. No rash.         Physical Exam    Laboratory:    Results              CBC:  Recent Labs   Lab 08/11/22  0618 04/14/23  0818 07/11/24  1224   WBC 6.32 5.15 5.63   RBC 4.78 4.53 4.57   Hemoglobin 12.3 11.6 L 11.8 L   Hematocrit 37.8 37.8 38.6   Platelets 255 237 214   MCV 79 L 83 85   MCH 25.7 L 25.6 L 25.8 L   MCHC 32.5 30.7 L 30.6 L     CMP:  Recent Labs   Lab 08/11/22  0618 04/14/23  0818 07/11/24  1224   Glucose 105 86 81   Calcium 8.7 9.3 9.0   Albumin 4.1 3.6 3.5   Total Protein 7.9 7.4 7.3   Sodium 138 139 141   Potassium 4.2 3.9 3.9   CO2 29 25 30 H   Chloride 99 105 106   BUN 9 17 11   Alkaline Phosphatase 81 59 69   ALT 12 7 L 8 L   AST 19 11 13   Total Bilirubin 1.3 H 0.7 0.5     URINALYSIS:       LIPIDS:  Recent Labs "   Lab 08/11/22  0618 04/14/23  0818 07/11/24  1224   TSH 1.050 0.378 L 0.373 L   HDL 81 H 66 61   Cholesterol 207 H 198 184   Triglycerides 46 44 67   LDL Cholesterol 116.8 123.2 109.6   HDL/Cholesterol Ratio 39.1 33.3 33.2   Non-HDL Cholesterol 126 132 123   Total Cholesterol/HDL Ratio 2.6 3.0 3.0     TSH:  Recent Labs   Lab 08/11/22  0618 04/14/23  0818 07/11/24  1224   TSH 1.050 0.378 L 0.373 L     A1C:  Recent Labs   Lab 08/11/22  0618 04/14/23  0818 07/11/24  1224   Hemoglobin A1C 5.4 5.3 5.7 H           This note was generated with the assistance of ambient listening technology. Verbal consent was obtained by the patient and accompanying visitor(s) for the recording of patient appointment to facilitate this note. I attest to having reviewed and edited the generated note for accuracy, though some syntax or spelling errors may persist. Please contact the author of this note for any clarification.      Kendrick Stanley MD  [facility]                     [1]   Social History  Tobacco Use    Smoking status: Never     Passive exposure: Never    Smokeless tobacco: Never   Substance Use Topics    Alcohol use: Yes     Comment: occasionally    Drug use: No   [2]   Outpatient Encounter Medications as of 2/26/2025   Medication Sig Dispense Refill    albuterol (PROVENTIL/VENTOLIN HFA) 90 mcg/actuation inhaler Inhale 2 puffs into the lungs every 4 (four) hours as needed for Wheezing or Shortness of Breath. 18 g 0    ALPRAZolam (XANAX) 0.5 MG tablet Take 1 tablet (0.5 mg total) by mouth 3 (three) times daily as needed for Anxiety. 21 tablet 0    ammonium lactate 12 % Crea Apply small amount to feet except for in between toes twice a day 140 g 6    cetirizine (ZYRTEC) 10 MG tablet Take 1 tablet (10 mg total) by mouth 2 (two) times a day. for 7 days 14 tablet 0    diclofenac sodium (VOLTAREN) 1 % Gel Apply 2 g topically 4 (four) times daily. 100 g 0    ibuprofen (ADVIL,MOTRIN) 800 MG tablet Take 1 tablet (800 mg  "total) by mouth 3 (three) times daily. 90 tablet 1    loratadine (CLARITIN) 10 mg tablet Take 1 tablet (10 mg total) by mouth daily as needed for Allergies. 30 tablet 0    naloxone (NARCAN) 4 mg/actuation Spry 4mg by nasal route as needed for opioid overdose; may repeat every 2-3 minutes in alternating nostrils until medical help arrives. Call 911 (Patient not taking: Reported on 9/17/2024) 2 each 11    pantoprazole (PROTONIX) 40 MG tablet Take 1 tablet (40 mg total) by mouth once daily. for 14 days 14 tablet 0    pen needle, diabetic (PEN NEEDLE) 32 gauge x 5/32" Ndle Use as directed for semaglutide injection (Patient not taking: Reported on 9/17/2024) 100 each 1    traZODone (DESYREL) 50 MG tablet Take 1 tablet (50 mg total) by mouth every evening. (Patient not taking: Reported on 9/17/2024) 30 tablet 11    triamterene-hydrochlorothiazide 37.5-25 mg (MAXZIDE-25) 37.5-25 mg per tablet Take 1 tablet by mouth daily as needed (leg swelling). 90 tablet 3    [DISCONTINUED] ibuprofen (ADVIL,MOTRIN) 800 MG tablet Take 1 tablet (800 mg total) by mouth 3 (three) times daily. 90 tablet 1    [DISCONTINUED] oxyCODONE-acetaminophen (PERCOCET)  mg per tablet Take 1 tablet by mouth every 4 (four) hours as needed for Pain. 21 tablet 0    [DISCONTINUED] oxyCODONE-acetaminophen (PERCOCET)  mg per tablet Take 1 tablet by mouth every 4 (four) hours as needed for Pain. 21 tablet 0     No facility-administered encounter medications on file as of 2/26/2025.     "

## 2025-02-28 ENCOUNTER — RESULTS FOLLOW-UP (OUTPATIENT)
Dept: PRIMARY CARE CLINIC | Facility: CLINIC | Age: 55
End: 2025-02-28

## 2025-03-11 DIAGNOSIS — G89.29 CHRONIC BILATERAL LOW BACK PAIN WITHOUT SCIATICA: ICD-10-CM

## 2025-03-11 DIAGNOSIS — M54.50 CHRONIC BILATERAL LOW BACK PAIN WITHOUT SCIATICA: ICD-10-CM

## 2025-03-11 RX ORDER — OXYCODONE AND ACETAMINOPHEN 10; 325 MG/1; MG/1
1 TABLET ORAL EVERY 4 HOURS PRN
Qty: 21 TABLET | Refills: 0 | Status: SHIPPED | OUTPATIENT
Start: 2025-03-11

## 2025-03-11 NOTE — TELEPHONE ENCOUNTER
----- Message from Pippa sent at 3/11/2025  8:25 AM CDT -----  Contact: [phone]  Requesting an RX refill or new RX.Is this a refill or new RX: RX name and strength oxyCODONE-acetaminophen (PERCOCET)  mg per tabletIs this a 30 day or 90 day RX: Pharmacy name and phone # [facility]/[id number] [address]Kwwki: [phone] Fax: [phone]

## 2025-03-11 NOTE — TELEPHONE ENCOUNTER
No care due was identified.  [facility] Embedded Care Due Messages. Reference number: [id number].   3/11/2025 8:29:27 AM CDT

## 2025-04-09 DIAGNOSIS — M54.50 CHRONIC BILATERAL LOW BACK PAIN WITHOUT SCIATICA: ICD-10-CM

## 2025-04-09 DIAGNOSIS — G89.29 CHRONIC BILATERAL LOW BACK PAIN WITHOUT SCIATICA: ICD-10-CM

## 2025-04-09 RX ORDER — OXYCODONE AND ACETAMINOPHEN 10; 325 MG/1; MG/1
1 TABLET ORAL EVERY 4 HOURS PRN
Qty: 21 TABLET | Refills: 0 | Status: SHIPPED | OUTPATIENT
Start: 2025-04-09

## 2025-04-09 NOTE — TELEPHONE ENCOUNTER
----- Message from Mandy sent at 4/9/2025  9:46 AM CDT -----  Contact: [phone] Patient  Requesting an RX refill or new RX.Is this a refill or new RX: newRX name and strength (copy/paste from chart):   oxyCODONE-acetaminophen (PERCOCET)  mg per tablet 21 tablet 0 3/11/2025 - NoIs this a 30 day or 90 day RX: Pharmacy name and phone # (copy/paste from chart):  [facility]/[id number] [facility] [address] [address]Bkdhx: [phone] Fax: [phone] doctors have asked that we provide their patients with the following 2 reminders -- prescription refills can take up to 72 hours, and a friendly reminder that in the future you can use your [facility] account to request refills: yes Comments: Please call once completed.

## 2025-04-09 NOTE — TELEPHONE ENCOUNTER
Care Due:                  Date            Visit Type   Department     Provider  --------------------------------------------------------------------------------                                EP -                              PRIMARY      [facility] PR[facility]  Last Visit: 02-      CARE ([facility])   CARE           Kendrick Meraz                              EP -                              PRIMARY      [facility] PRIMARY  Next Visit: 07-      CARE ([facility])   CARE           Kendrick Meraz                                                            Last  Test          Frequency    Reason                     Performed    Due Date  --------------------------------------------------------------------------------    CBC.........  12 months..  ibuprofen................  07- 07-    CMP.........  12 months..  ibuprofen,                 07- 07-                             triamterene-hydrochloroth                             iazide...................    [employer] Embedded Care Due Messages. Reference number: [id number].   4/09/2025 9:49:39 AM CDT

## 2025-05-07 DIAGNOSIS — G89.29 CHRONIC BILATERAL LOW BACK PAIN WITHOUT SCIATICA: ICD-10-CM

## 2025-05-07 DIAGNOSIS — M54.50 CHRONIC BILATERAL LOW BACK PAIN WITHOUT SCIATICA: ICD-10-CM

## 2025-05-07 NOTE — TELEPHONE ENCOUNTER
----- Message from Zeenat sent at 5/7/2025 10:34 AM CDT -----  Requesting an RX refill or new RX.Is this a refill or new RX: refillRX name and strength (copy/paste from chart):  oxyCODONE-acetaminophen (PERCOCET)  mg per tablet Is this a 30 day or 90 day RX: 30Pharmacy name and phone # (copy/paste from chart):  [facility]/[id number] [facility] [place] [address] [address]Gkyqe: [phone] Fax: [phone] doctors have asked that we provide their patients with the following 2 reminders -- prescription refills can take up to 72 hours, and a friendly reminder that in the future you can use your [facility] account to request refills: yes

## 2025-05-07 NOTE — TELEPHONE ENCOUNTER
No care due was identified.  [facility] Embedded Care Due Messages. Reference number: [id number].   5/07/2025 10:41:36 AM CDT

## 2025-05-08 RX ORDER — OXYCODONE AND ACETAMINOPHEN 10; 325 MG/1; MG/1
1 TABLET ORAL EVERY 4 HOURS PRN
Qty: 21 TABLET | Refills: 0 | Status: SHIPPED | OUTPATIENT
Start: 2025-05-08

## 2025-06-02 ENCOUNTER — OFFICE VISIT (OUTPATIENT)
Dept: URGENT CARE | Facility: CLINIC | Age: 55
End: 2025-06-02
Payer: COMMERCIAL

## 2025-06-02 VITALS
BODY MASS INDEX: 35.85 KG/M2 | DIASTOLIC BLOOD PRESSURE: 84 MMHG | OXYGEN SATURATION: 97 % | TEMPERATURE: 98 F | WEIGHT: 210 LBS | RESPIRATION RATE: 16 BRPM | HEIGHT: 64 IN | HEART RATE: 84 BPM | SYSTOLIC BLOOD PRESSURE: 150 MMHG

## 2025-06-02 DIAGNOSIS — R09.81 SINUS CONGESTION: ICD-10-CM

## 2025-06-02 DIAGNOSIS — J40 BRONCHITIS: Primary | ICD-10-CM

## 2025-06-02 DIAGNOSIS — R05.1 ACUTE COUGH: ICD-10-CM

## 2025-06-02 PROCEDURE — 99213 OFFICE O/P EST LOW 20 MIN: CPT | Mod: 25,S$GLB,, | Performed by: PHYSICIAN ASSISTANT

## 2025-06-02 PROCEDURE — 96372 THER/PROPH/DIAG INJ SC/IM: CPT | Mod: S$GLB,,, | Performed by: FAMILY MEDICINE

## 2025-06-02 RX ORDER — FLUTICASONE PROPIONATE 50 MCG
1 SPRAY, SUSPENSION (ML) NASAL DAILY
Qty: 16 G | Refills: 3 | Status: SHIPPED | OUTPATIENT
Start: 2025-06-02

## 2025-06-02 RX ORDER — DEXAMETHASONE SODIUM PHOSPHATE 10 MG/ML
10 INJECTION INTRAMUSCULAR; INTRAVENOUS
Status: COMPLETED | OUTPATIENT
Start: 2025-06-02 | End: 2025-06-02

## 2025-06-02 RX ORDER — ALBUTEROL SULFATE 90 UG/1
2 INHALANT RESPIRATORY (INHALATION) EVERY 4 HOURS PRN
Qty: 18 G | Refills: 0 | Status: SHIPPED | OUTPATIENT
Start: 2025-06-02 | End: 2026-06-02

## 2025-06-02 RX ORDER — PREDNISOLONE 15 MG/5ML
30 SOLUTION ORAL DAILY
Qty: 60 ML | Refills: 0 | Status: SHIPPED | OUTPATIENT
Start: 2025-06-02 | End: 2025-06-08

## 2025-06-02 RX ORDER — CROMOLYN SODIUM 5.2 MG/ML
1 SPRAY, METERED NASAL 4 TIMES DAILY
Qty: 26 ML | Refills: 1 | Status: SHIPPED | OUTPATIENT
Start: 2025-06-02

## 2025-06-02 RX ORDER — BENZONATATE 100 MG/1
100 CAPSULE ORAL 4 TIMES DAILY PRN
Qty: 40 CAPSULE | Refills: 0 | Status: SHIPPED | OUTPATIENT
Start: 2025-06-02 | End: 2025-06-12

## 2025-06-02 RX ORDER — PROMETHAZINE HYDROCHLORIDE AND DEXTROMETHORPHAN HYDROBROMIDE 6.25; 15 MG/5ML; MG/5ML
5 SYRUP ORAL EVERY 8 HOURS PRN
Qty: 118 ML | Refills: 0 | Status: SHIPPED | OUTPATIENT
Start: 2025-06-02 | End: 2025-06-12

## 2025-06-02 RX ADMIN — DEXAMETHASONE SODIUM PHOSPHATE 10 MG: 10 INJECTION INTRAMUSCULAR; INTRAVENOUS at 02:06

## 2025-06-04 DIAGNOSIS — Z12.31 OTHER SCREENING MAMMOGRAM: ICD-10-CM

## 2025-06-06 DIAGNOSIS — J40 BRONCHITIS: Primary | ICD-10-CM

## 2025-06-06 DIAGNOSIS — M54.50 CHRONIC BILATERAL LOW BACK PAIN WITHOUT SCIATICA: ICD-10-CM

## 2025-06-06 DIAGNOSIS — G89.29 CHRONIC BILATERAL LOW BACK PAIN WITHOUT SCIATICA: ICD-10-CM

## 2025-06-06 RX ORDER — OXYCODONE AND ACETAMINOPHEN 10; 325 MG/1; MG/1
1 TABLET ORAL EVERY 4 HOURS PRN
Qty: 21 TABLET | Refills: 0 | Status: SHIPPED | OUTPATIENT
Start: 2025-06-06

## 2025-06-06 RX ORDER — OXYCODONE AND ACETAMINOPHEN 10; 325 MG/1; MG/1
1 TABLET ORAL EVERY 4 HOURS PRN
Qty: 21 TABLET | Refills: 0 | Status: CANCELLED | OUTPATIENT
Start: 2025-06-06

## 2025-06-06 RX ORDER — AZITHROMYCIN 250 MG/1
TABLET, FILM COATED ORAL
Qty: 6 TABLET | Refills: 0 | Status: SHIPPED | OUTPATIENT
Start: 2025-06-06 | End: 2025-06-11

## 2025-06-18 ENCOUNTER — RESULTS FOLLOW-UP (OUTPATIENT)
Dept: PRIMARY CARE CLINIC | Facility: CLINIC | Age: 55
End: 2025-06-18

## 2025-07-07 DIAGNOSIS — M54.50 CHRONIC BILATERAL LOW BACK PAIN WITHOUT SCIATICA: ICD-10-CM

## 2025-07-07 DIAGNOSIS — G89.29 CHRONIC BILATERAL LOW BACK PAIN WITHOUT SCIATICA: ICD-10-CM

## 2025-07-07 RX ORDER — OXYCODONE AND ACETAMINOPHEN 10; 325 MG/1; MG/1
1 TABLET ORAL EVERY 4 HOURS PRN
Qty: 21 TABLET | Refills: 0 | Status: SHIPPED | OUTPATIENT
Start: 2025-07-07

## 2025-07-07 NOTE — TELEPHONE ENCOUNTER
Copied from CRM #[id number]. Topic: Medications - Medication Refill  >> Jul 7, 2025  1:53 PM Henok wrote:  Requesting an RX refill or new RX.    Is this a refill or new RX: Refill    RX name and strength (copy/paste from chart):  oxyCODONE-acetaminophen (PERCOCET)  mg per tablet    Is this a 30 day or 90 day RX: 21    Pharmacy name and phone # (copy/paste from chart):  [facility]  [address] [address] [address] [place] [address]  Phone: [phone] Fax: [phone]        Who called and call back number:Pt     The doctors have asked that we provide their patients with the following 2 reminders -- prescription refills can take up to 72 hours, and a friendly reminder that in the future you can use your [facility] account to request refills: yes

## 2025-07-07 NOTE — TELEPHONE ENCOUNTER
Care Due:                  Date            Visit Type   Department     Provider  --------------------------------------------------------------------------------                                EP -                              PRIMARY      [facility] PR[facility]  Last Visit: 02-      CARE ([facility])   CARE           Kendrick Meraz                              EP -                              PRIMARY      [facility] PRIMARY  Next Visit: 08-      CARE ([facility])   CARE           Kendrick Meraz                                                            Last  Test          Frequency    Reason                     Performed    Due Date  --------------------------------------------------------------------------------    CBC.........  12 months..  ibuprofen................  07- 07-    CMP.........  12 months..  ibuprofen,                 07- 07-                             triamterene-hydrochloroth                             iazide...................    [employer] Embedded Care Due Messages. Reference number: [id number].   7/07/2025 1:57:04 PM CDT

## 2025-08-04 ENCOUNTER — LAB VISIT (OUTPATIENT)
Dept: LAB | Facility: HOSPITAL | Age: 55
End: 2025-08-04
Attending: INTERNAL MEDICINE
Payer: COMMERCIAL

## 2025-08-04 ENCOUNTER — OFFICE VISIT (OUTPATIENT)
Dept: PRIMARY CARE CLINIC | Facility: CLINIC | Age: 55
End: 2025-08-04
Payer: COMMERCIAL

## 2025-08-04 VITALS
HEIGHT: 64 IN | DIASTOLIC BLOOD PRESSURE: 80 MMHG | SYSTOLIC BLOOD PRESSURE: 130 MMHG | WEIGHT: 269.38 LBS | HEART RATE: 65 BPM | BODY MASS INDEX: 45.99 KG/M2 | OXYGEN SATURATION: 98 %

## 2025-08-04 DIAGNOSIS — Z00.00 ANNUAL PHYSICAL EXAM: ICD-10-CM

## 2025-08-04 DIAGNOSIS — M54.50 CHRONIC BILATERAL LOW BACK PAIN WITHOUT SCIATICA: ICD-10-CM

## 2025-08-04 DIAGNOSIS — R73.01 IMPAIRED FASTING GLUCOSE: ICD-10-CM

## 2025-08-04 DIAGNOSIS — Z00.00 ANNUAL PHYSICAL EXAM: Primary | ICD-10-CM

## 2025-08-04 DIAGNOSIS — G89.29 CHRONIC BILATERAL LOW BACK PAIN WITHOUT SCIATICA: ICD-10-CM

## 2025-08-04 DIAGNOSIS — K21.9 GERD WITHOUT ESOPHAGITIS: ICD-10-CM

## 2025-08-04 LAB
AMPHET UR QL SCN: NEGATIVE
BARBITURATE SCN PRESENT UR: NEGATIVE
BENZODIAZ UR QL SCN: NEGATIVE
CANNABINOIDS UR QL SCN: NEGATIVE
COCAINE UR QL SCN: NEGATIVE
CREAT UR-MCNC: 96 MG/DL (ref 15–325)
ETHANOL UR-MCNC: <10 MG/DL
METHADONE UR QL SCN: NEGATIVE
OPIATES UR QL SCN: NEGATIVE
PCP UR QL: NEGATIVE

## 2025-08-04 PROCEDURE — 99999 PR PBB SHADOW E&M-EST. PATIENT-LVL IV: CPT | Mod: PBBFAC,,, | Performed by: INTERNAL MEDICINE

## 2025-08-04 PROCEDURE — 80307 DRUG TEST PRSMV CHEM ANLYZR: CPT

## 2025-08-04 RX ORDER — PANTOPRAZOLE SODIUM 40 MG/1
40 TABLET, DELAYED RELEASE ORAL DAILY
Qty: 90 TABLET | Refills: 3 | Status: SHIPPED | OUTPATIENT
Start: 2025-08-04

## 2025-08-04 RX ORDER — OXYCODONE AND ACETAMINOPHEN 10; 325 MG/1; MG/1
1 TABLET ORAL EVERY 4 HOURS PRN
Qty: 21 TABLET | Refills: 0 | Status: SHIPPED | OUTPATIENT
Start: 2025-08-04

## 2025-08-04 NOTE — PROGRESS NOTES
[facility] Note    Chief Complaint      Chief Complaint   Patient presents with    Follow-up       History of Present Illness      History of Present Illness    CHIEF COMPLAINT:  Ms. Yousif presents today for follow up of weight management.    WEIGHT MANAGEMENT:  She reports ongoing weight management efforts with exercise three days per week through dancing. She has made dietary modifications including increased fruit and vegetable intake, significant reduction in fried food consumption, and elimination of soda for over a month. She has also reduced bread and rice consumption to approximately once per week. She acknowledges the challenges of maintaining consistent weight loss progress and remains committed to lifestyle modifications.    CURRENT MEDICATIONS:  She continues oxycodone, acid reflux medication, and ibuprofen and requires refills for these medications.      ROS:  Gastrointestinal: +indigestion, +heartburn         Assessment/Plan     Indira Yousif is a 54 y.o.female with:    Assessment & Plan    PLAN SUMMARY:  - Refilled oxycodone as requested  - Refilled acid reflux medication as requested  - Continued ibuprofen therapy with 1 refill remaining  - Continue current exercise regimen of 3 days per week  - Maintain dietary improvements (increased fruits and vegetables, reduced fried foods, bread, rice, and soda)  - Consider [facility] clinic in [place] for weight loss treatment and GLP-1 agonist injections    GASTRO-ESOPHAGEAL REFLUX DISEASE:  - Refilled the patient's acid reflux medication as requested and confirmed.    LONG TERM USE OF OPIATE ANALGESIC:  - Refilled the patient's oxycodone as requested and confirmed.    LONG TERM USE OF NSAIDS:  - Continued the patient's ibuprofen therapy with 1 refill remaining.    WEIGHT MANAGEMENT:  - Discussed the fundamental principle of weight loss: calories in vs. calories burned.  - Ms. Yousif to continue current exercise regimen of 3 days per  week and maintain dietary improvements, including increased fruit and vegetable intake with reduced consumption of fried foods, bread, rice, and soda.  - Consider [facility] in [place] for potential weight loss treatment and more affordable GLP-1 agonist injections.         1. Chronic bilateral low back pain without sciatica  - oxyCODONE-acetaminophen (PERCOCET)  mg per tablet; Take 1 tablet by mouth every 4 (four) hours as needed for Pain.  Dispense: 21 tablet; Refill: 0    2. GERD without esophagitis  - pantoprazole (PROTONIX) 40 MG tablet; Take 1 tablet (40 mg total) by mouth once daily.  Dispense: 90 tablet; Refill: 3    3. Annual physical exam  - CBC Auto Differential; Future  - Comprehensive Metabolic Panel; Future  - Lipid Panel; Future  - Toxicology Screen, Urine; Future  - Hemoglobin A1C; Future  - TSH; Future    4. Impaired fasting glucose  - Hemoglobin A1C; Future      Chronic conditions status updated as per HPI.  Other than changes above, cont current medications and maintain follow up with specialists.  No follow-ups on file.    Future Appointments   Date Time Provider Department Center   11/10/2025  2:30 PM Kendrick Stanley MD [facility]           Past Medical History:  Past Medical History:   Diagnosis Date    Anxiety        Past Surgical History:   has a past surgical history that includes Tubal ligation;  section; Hernia repair; Cyst Removal (Left, 1980); and Hysterectomy.    Family History:  family history includes Diabetes in her mother; Hypertension in her father and mother; Kidney disease in her mother; No Known Problems in her brother, brother, brother, brother, sister, son, and son.     Social History:  Social History[1]    Medications:  Encounter Medications[2]    Allergies:  Review of patient's allergies indicates:  No Known Allergies    Health Maintenance:  Immunization History   Administered Date(s) Administered    COVID-19, MRNA, LN-S, PF (MODERNA  "FULL 0.5 ML DOSE) 03/16/2021, 04/13/2021    DTP 11/28/1973, 01/31/1974, 03/23/1977, 08/31/1977    Influenza - Quadrivalent 10/19/2016    Influenza - Quadrivalent - PF *Preferred* (6 months and older) 10/02/2017, 10/17/2018    MMR 01/31/1974    OPV 11/28/1973, 01/31/1974, 03/23/1977, 08/31/1977      Health Maintenance   Topic Date Due    TETANUS VACCINE  Never done    Sign Pain Contract  Never done    Complete Opioid Risk Tool  Never done    Naloxone Prescription  Never done    Shingles Vaccine (1 of 2) Never done    Pneumococcal Vaccines (Age 50+) (1 of 1 - PCV) Never done    COVID-19 Vaccine (3 - 2024-25 season) 09/01/2024    Colorectal Cancer Screening  07/04/2025    Influenza Vaccine (1) 09/01/2025    Urine Drug Screen  02/04/2026    Mammogram  06/16/2026    Hemoglobin A1c (Diabetic Prevention Screening)  08/06/2028    Lipid Panel  08/06/2030    RSV Vaccine (Age 60+ and Pregnant patients) (1 - 1-dose 75+ series) 09/27/2047    Hepatitis C Screening  Completed    HIV Screening  Completed        Physical Exam      Vital Signs  Pulse: 65  SpO2: 98 %  BP: 130/80  BP Location: Left arm  Patient Position: Sitting  Pain Score: 0-No pain  Height and Weight  Height: 5' 4" (162.6 cm)  Weight: 122.2 kg (269 lb 6.4 oz)  BSA (Calculated - sq m): 2.35 sq meters  BMI (Calculated): 46.2  Weight in (lb) to have BMI = 25: 145.3]    Physical Exam    General: No acute distress. Well-developed. Well-nourished.  Eyes: EOMI. Sclerae anicteric.  HENT: Normocephalic. Atraumatic. Nares patent. Moist oral mucosa.  Ears: Bilateral TMs clear. Bilateral EACs clear.  Cardiovascular: Regular rate. Regular rhythm. No murmurs. No rubs. No gallops. Normal S1, S2.  Respiratory: Normal respiratory effort. Clear to auscultation bilaterally. No rales. No rhonchi. No wheezing.  Abdomen: Soft. Non-tender. Non-distended. Normoactive bowel sounds.  Musculoskeletal: No  obvious deformity.  Extremities: No lower extremity edema.  Neurological: Alert & " oriented x3. No slurred speech. Normal gait.  Psychiatric: Normal mood. Normal affect. Good insight. Good judgment.  Skin: Warm. Dry. No rash.         Physical Exam  Vitals reviewed.   Constitutional:       Appearance: She is well-developed.   HENT:      Head: Normocephalic and atraumatic.      Right Ear: External ear normal.      Left Ear: External ear normal.   Cardiovascular:      Rate and Rhythm: Normal rate and regular rhythm.      Heart sounds: Normal heart sounds. No murmur heard.  Pulmonary:      Effort: Pulmonary effort is normal.      Breath sounds: Normal breath sounds. No wheezing or rales.   Abdominal:      General: Bowel sounds are normal. There is no distension.      Palpations: Abdomen is soft.      Tenderness: There is no abdominal tenderness.         Laboratory:    Results              CBC:  Recent Labs   Lab 04/14/23 0818 07/11/24 1224 08/06/25  0650   WBC 5.15 5.63 4.87   RBC 4.53 4.57 4.71   Hemoglobin 11.6 L 11.8 L  --    HGB  --   --  12.1   Hematocrit 37.8 38.6  --    HCT  --   --  38.3   Platelet Count  --   --  231   Platelets 237 214  --    MCV 83 85 81 L   MCH 25.6 L 25.8 L 25.7 L   MCHC 30.7 L 30.6 L 31.6 L     CMP:  Recent Labs   Lab 04/14/23 0818 07/11/24 1224 08/06/25  0650   Glucose 86 81 90   Calcium 9.3 9.0 9.1   Albumin 3.6 3.5 3.9   Protein Total  --   --  7.6   Total Protein 7.4 7.3  --    Sodium 139 141 140   Potassium 3.9 3.9 4.6   CO2 25 30 H 30 H   Chloride 105 106 107   BUN 17 11 15   Alkaline Phosphatase 59 69  --    ALP  --   --  70   ALT 7 L 8 L 8 L   AST 11 13 15   Total Bilirubin 0.7 0.5  --    Bilirubin Total  --   --  0.5     URINALYSIS:       LIPIDS:  Recent Labs   Lab 04/14/23 0818 07/11/24 1224 08/06/25  0650   TSH 0.378 L 0.373 L 0.450   HDL 66 61  --    HDL Cholesterol  --   --  73   Cholesterol Total  --   --  195   Cholesterol 198 184  --    Triglycerides 44 67  --    Triglyceride  --   --  45   LDL Cholesterol 123.2 109.6 113.0   HDL/Cholesterol Ratio  33.3 33.2 37.4   Non-HDL Cholesterol 132 123  --    Non HDL Cholesterol  --   --  122   Total Cholesterol/HDL Ratio 3.0 3.0  --    Cholesterol/HDL Ratio  --   --  2.7     TSH:  Recent Labs   Lab 04/14/23  0818 07/11/24  1224 08/06/25  0650   TSH 0.378 L 0.373 L 0.450     A1C:  Recent Labs   Lab 08/11/22  0618 04/14/23  0818 07/11/24  1224 08/06/25  0650   Hemoglobin A1C 5.4 5.3 5.7 H  --    Hemoglobin A1c  --   --   --  5.6           This note was generated with the assistance of ambient listening technology. Verbal consent was obtained by the patient and accompanying visitor(s) for the recording of patient appointment to facilitate this note. I attest to having reviewed and edited the generated note for accuracy, though some syntax or spelling errors may persist. Please contact the author of this note for any clarification.      Kendrick Stanley MD  [facility]                       [1]   Social History  Tobacco Use    Smoking status: Never     Passive exposure: Never    Smokeless tobacco: Never   Substance Use Topics    Alcohol use: Yes     Comment: occasionally    Drug use: No   [2]   Outpatient Encounter Medications as of 8/4/2025   Medication Sig Dispense Refill    albuterol (VENTOLIN HFA) 90 mcg/actuation inhaler Inhale 2 puffs into the lungs every 4 (four) hours as needed for Wheezing or Shortness of Breath (COUGH). Rescue 18 g 0    ALPRAZolam (XANAX) 0.5 MG tablet Take 1 tablet (0.5 mg total) by mouth 3 (three) times daily as needed for Anxiety. 21 tablet 0    ammonium lactate 12 % Crea Apply small amount to feet except for in between toes twice a day (Patient not taking: Reported on 6/2/2025) 140 g 6    cetirizine (ZYRTEC) 10 MG tablet Take 1 tablet (10 mg total) by mouth 2 (two) times a day. for 7 days 14 tablet 0    cromolyn (NASALCHROM) 5.2 mg/spray (4 %) nasal spray 1 spray by Nasal route 4 (four) times daily. 26 mL 1    diclofenac sodium (VOLTAREN) 1 % Gel Apply 2 g topically 4 (four) times  "daily. (Patient not taking: Reported on 6/2/2025) 100 g 0    fluticasone propionate (FLONASE) 50 mcg/actuation nasal spray 1 spray (50 mcg total) by Each Nostril route once daily. 16 g 3    ibuprofen (ADVIL,MOTRIN) 800 MG tablet Take 1 tablet (800 mg total) by mouth 3 (three) times daily. 90 tablet 1    loratadine (CLARITIN) 10 mg tablet Take 1 tablet (10 mg total) by mouth daily as needed for Allergies. 30 tablet 0    naloxone (NARCAN) 4 mg/actuation Spry 4mg by nasal route as needed for opioid overdose; may repeat every 2-3 minutes in alternating nostrils until medical help arrives. Call 911 (Patient not taking: Reported on 6/2/2025) 2 each 11    oxyCODONE-acetaminophen (PERCOCET)  mg per tablet Take 1 tablet by mouth every 4 (four) hours as needed for Pain. 21 tablet 0    pantoprazole (PROTONIX) 40 MG tablet Take 1 tablet (40 mg total) by mouth once daily. 90 tablet 3    pen needle, diabetic (PEN NEEDLE) 32 gauge x 5/32" Ndle Use as directed for semaglutide injection (Patient not taking: Reported on 6/2/2025) 100 each 1    traZODone (DESYREL) 50 MG tablet Take 1 tablet (50 mg total) by mouth every evening. (Patient not taking: Reported on 9/17/2024) 30 tablet 11    triamterene-hydrochlorothiazide 37.5-25 mg (MAXZIDE-25) 37.5-25 mg per tablet Take 1 tablet by mouth daily as needed (leg swelling). 90 tablet 3    [DISCONTINUED] oxyCODONE-acetaminophen (PERCOCET)  mg per tablet Take 1 tablet by mouth every 4 (four) hours as needed for Pain. 21 tablet 0    [DISCONTINUED] oxyCODONE-acetaminophen (PERCOCET)  mg per tablet Take 1 tablet by mouth every 4 (four) hours as needed for Pain. 21 tablet 0    [DISCONTINUED] pantoprazole (PROTONIX) 40 MG tablet Take 1 tablet (40 mg total) by mouth once daily. for 14 days 14 tablet 0     No facility-administered encounter medications on file as of 8/4/2025.     "

## 2025-09-04 DIAGNOSIS — G89.29 CHRONIC BILATERAL LOW BACK PAIN WITHOUT SCIATICA: ICD-10-CM

## 2025-09-04 DIAGNOSIS — M54.50 CHRONIC BILATERAL LOW BACK PAIN WITHOUT SCIATICA: ICD-10-CM

## 2025-09-04 RX ORDER — OXYCODONE AND ACETAMINOPHEN 10; 325 MG/1; MG/1
1 TABLET ORAL EVERY 4 HOURS PRN
Qty: 21 TABLET | Refills: 0 | Status: SHIPPED | OUTPATIENT
Start: 2025-09-04